# Patient Record
Sex: MALE | Race: WHITE | NOT HISPANIC OR LATINO | Employment: UNEMPLOYED | URBAN - METROPOLITAN AREA
[De-identification: names, ages, dates, MRNs, and addresses within clinical notes are randomized per-mention and may not be internally consistent; named-entity substitution may affect disease eponyms.]

---

## 2014-09-09 LAB
CREAT SERPL-MCNC: 1.07 MG/DL (ref 0.72–1.25)
GLUCOSE BLD-MCNC: 184 MG/DL (ref 65–100)

## 2014-09-16 LAB
CREAT SERPL-MCNC: 1.03 MG/DL (ref 0.72–1.25)
GLUCOSE BLD-MCNC: 91 MG/DL (ref 65–100)

## 2016-09-01 LAB
CREAT SERPL-MCNC: 1.12 MG/DL (ref 0.72–1.25)
GLUCOSE BLD-MCNC: 372 MG/DL (ref 65–100)

## 2017-02-04 ENCOUNTER — OFFICE VISIT - RIVER FALLS (OUTPATIENT)
Dept: FAMILY MEDICINE | Facility: CLINIC | Age: 42
End: 2017-02-04

## 2017-02-04 ASSESSMENT — MIFFLIN-ST. JEOR: SCORE: 1586.6

## 2017-02-06 ENCOUNTER — OFFICE VISIT - RIVER FALLS (OUTPATIENT)
Dept: FAMILY MEDICINE | Facility: CLINIC | Age: 42
End: 2017-02-06

## 2017-02-08 ENCOUNTER — OFFICE VISIT - RIVER FALLS (OUTPATIENT)
Dept: FAMILY MEDICINE | Facility: CLINIC | Age: 42
End: 2017-02-08

## 2017-02-08 ASSESSMENT — MIFFLIN-ST. JEOR: SCORE: 1553.04

## 2017-08-24 ENCOUNTER — OFFICE VISIT - RIVER FALLS (OUTPATIENT)
Dept: FAMILY MEDICINE | Facility: CLINIC | Age: 42
End: 2017-08-24

## 2017-08-25 LAB
CREAT SERPL-MCNC: 1.21 MG/DL (ref 0.6–1.35)
GLUCOSE BLD-MCNC: 375 MG/DL (ref 65–99)
HBA1C MFR BLD: 10.3 %

## 2017-08-28 ENCOUNTER — COMMUNICATION - RIVER FALLS (OUTPATIENT)
Dept: FAMILY MEDICINE | Facility: CLINIC | Age: 42
End: 2017-08-28

## 2017-08-28 ENCOUNTER — OFFICE VISIT - RIVER FALLS (OUTPATIENT)
Dept: FAMILY MEDICINE | Facility: CLINIC | Age: 42
End: 2017-08-28

## 2017-08-28 ASSESSMENT — MIFFLIN-ST. JEOR: SCORE: 1577.53

## 2017-09-25 ENCOUNTER — OFFICE VISIT - RIVER FALLS (OUTPATIENT)
Dept: FAMILY MEDICINE | Facility: CLINIC | Age: 42
End: 2017-09-25

## 2017-09-29 ENCOUNTER — OFFICE VISIT - RIVER FALLS (OUTPATIENT)
Dept: FAMILY MEDICINE | Facility: CLINIC | Age: 42
End: 2017-09-29

## 2017-09-29 ASSESSMENT — MIFFLIN-ST. JEOR: SCORE: 1594.48

## 2017-11-06 ENCOUNTER — OFFICE VISIT - RIVER FALLS (OUTPATIENT)
Dept: FAMILY MEDICINE | Facility: CLINIC | Age: 42
End: 2017-11-06

## 2017-11-06 ASSESSMENT — MIFFLIN-ST. JEOR: SCORE: 1612.91

## 2018-02-12 ENCOUNTER — AMBULATORY - RIVER FALLS (OUTPATIENT)
Dept: FAMILY MEDICINE | Facility: CLINIC | Age: 43
End: 2018-02-12

## 2018-02-13 LAB
CHOLEST SERPL-MCNC: 174 MG/DL
CHOLEST/HDLC SERPL: 2.9 {RATIO}
HBA1C MFR BLD: 7.9 %
HDLC SERPL-MCNC: 60 MG/DL
LDLC SERPL CALC-MCNC: 98 MG/DL
NONHDLC SERPL-MCNC: 114 MG/DL
TRIGL SERPL-MCNC: 71 MG/DL

## 2018-02-15 ENCOUNTER — OFFICE VISIT - RIVER FALLS (OUTPATIENT)
Dept: FAMILY MEDICINE | Facility: CLINIC | Age: 43
End: 2018-02-15

## 2018-09-28 ENCOUNTER — AMBULATORY - RIVER FALLS (OUTPATIENT)
Dept: FAMILY MEDICINE | Facility: CLINIC | Age: 43
End: 2018-09-28

## 2018-09-29 LAB
CREAT SERPL-MCNC: 1.15 MG/DL (ref 0.6–1.35)
GLUCOSE BLD-MCNC: 146 MG/DL (ref 65–99)
HBA1C MFR BLD: 8 %

## 2018-10-05 ENCOUNTER — OFFICE VISIT - RIVER FALLS (OUTPATIENT)
Dept: FAMILY MEDICINE | Facility: CLINIC | Age: 43
End: 2018-10-05

## 2019-02-21 ENCOUNTER — OFFICE VISIT - RIVER FALLS (OUTPATIENT)
Dept: FAMILY MEDICINE | Facility: CLINIC | Age: 44
End: 2019-02-21

## 2019-02-21 ASSESSMENT — MIFFLIN-ST. JEOR: SCORE: 1618.35

## 2019-03-14 ENCOUNTER — OFFICE VISIT - RIVER FALLS (OUTPATIENT)
Dept: FAMILY MEDICINE | Facility: CLINIC | Age: 44
End: 2019-03-14

## 2019-03-21 LAB — LDLC SERPL CALC-MCNC: 137 MG/DL

## 2019-03-28 LAB — HBA1C MFR BLD: NORMAL %

## 2019-04-15 ENCOUNTER — AMBULATORY - RIVER FALLS (OUTPATIENT)
Dept: FAMILY MEDICINE | Facility: CLINIC | Age: 44
End: 2019-04-15

## 2019-04-15 ENCOUNTER — OFFICE VISIT - RIVER FALLS (OUTPATIENT)
Dept: FAMILY MEDICINE | Facility: CLINIC | Age: 44
End: 2019-04-15

## 2019-04-16 LAB — HBA1C MFR BLD: 8.3 %

## 2019-06-10 ENCOUNTER — OFFICE VISIT - RIVER FALLS (OUTPATIENT)
Dept: FAMILY MEDICINE | Facility: CLINIC | Age: 44
End: 2019-06-10

## 2019-06-10 ASSESSMENT — MIFFLIN-ST. JEOR: SCORE: 1605.65

## 2019-06-25 LAB
TESTOSTERONE FREE: 7.3 NG/DL
TESTOSTERONE,TOTAL - HISTORICAL: 526.46 NG/DL

## 2019-07-11 ENCOUNTER — OFFICE VISIT - RIVER FALLS (OUTPATIENT)
Dept: FAMILY MEDICINE | Facility: CLINIC | Age: 44
End: 2019-07-11

## 2019-07-11 ASSESSMENT — MIFFLIN-ST. JEOR: SCORE: 1598.4

## 2019-07-12 LAB — HBA1C MFR BLD: 7.8 %

## 2019-07-15 ENCOUNTER — COMMUNICATION - RIVER FALLS (OUTPATIENT)
Dept: FAMILY MEDICINE | Facility: CLINIC | Age: 44
End: 2019-07-15

## 2019-08-13 ENCOUNTER — AMBULATORY - RIVER FALLS (OUTPATIENT)
Dept: FAMILY MEDICINE | Facility: CLINIC | Age: 44
End: 2019-08-13

## 2019-09-11 ENCOUNTER — OFFICE VISIT - RIVER FALLS (OUTPATIENT)
Dept: FAMILY MEDICINE | Facility: CLINIC | Age: 44
End: 2019-09-11

## 2019-10-03 ENCOUNTER — OFFICE VISIT - RIVER FALLS (OUTPATIENT)
Dept: FAMILY MEDICINE | Facility: CLINIC | Age: 44
End: 2019-10-03

## 2019-10-03 ASSESSMENT — MIFFLIN-ST. JEOR: SCORE: 1631.96

## 2019-10-04 LAB
BUN SERPL-MCNC: 18 MG/DL (ref 7–25)
BUN/CREAT RATIO - HISTORICAL: ABNORMAL (ref 6–22)
CALCIUM SERPL-MCNC: 10.1 MG/DL (ref 8.6–10.3)
CHLORIDE BLD-SCNC: 103 MMOL/L (ref 98–110)
CO2 SERPL-SCNC: 26 MMOL/L (ref 20–32)
CREAT SERPL-MCNC: 1.06 MG/DL (ref 0.6–1.35)
CREAT UR-MCNC: 117 MG/DL (ref 20–320)
EGFRCR SERPLBLD CKD-EPI 2021: 84 ML/MIN/1.73M2
GLUCOSE BLD-MCNC: 44 MG/DL (ref 65–99)
MICROALBUMIN UR-MCNC: 19 MG/DL
MICROALBUMIN/CREAT UR: 162 MG/G{CREAT}
POTASSIUM BLD-SCNC: 3.9 MMOL/L (ref 3.5–5.3)
SODIUM SERPL-SCNC: 138 MMOL/L (ref 135–146)

## 2019-10-05 ENCOUNTER — COMMUNICATION - RIVER FALLS (OUTPATIENT)
Dept: FAMILY MEDICINE | Facility: CLINIC | Age: 44
End: 2019-10-05

## 2019-10-07 ENCOUNTER — COMMUNICATION - RIVER FALLS (OUTPATIENT)
Dept: FAMILY MEDICINE | Facility: CLINIC | Age: 44
End: 2019-10-07

## 2019-11-01 ENCOUNTER — AMBULATORY - RIVER FALLS (OUTPATIENT)
Dept: FAMILY MEDICINE | Facility: CLINIC | Age: 44
End: 2019-11-01

## 2019-11-08 ENCOUNTER — OFFICE VISIT - RIVER FALLS (OUTPATIENT)
Dept: FAMILY MEDICINE | Facility: CLINIC | Age: 44
End: 2019-11-08

## 2019-11-08 ASSESSMENT — MIFFLIN-ST. JEOR: SCORE: 1632.33

## 2019-11-11 ENCOUNTER — COMMUNICATION - RIVER FALLS (OUTPATIENT)
Dept: FAMILY MEDICINE | Facility: CLINIC | Age: 44
End: 2019-11-11

## 2019-11-26 ENCOUNTER — OFFICE VISIT - RIVER FALLS (OUTPATIENT)
Dept: FAMILY MEDICINE | Facility: CLINIC | Age: 44
End: 2019-11-26

## 2019-11-26 ENCOUNTER — COMMUNICATION - RIVER FALLS (OUTPATIENT)
Dept: FAMILY MEDICINE | Facility: CLINIC | Age: 44
End: 2019-11-26

## 2019-11-26 LAB
A/G RATIO - HISTORICAL: 1.6 (ref 1–2)
ALBUMIN SERPL-MCNC: 4.4 G/DL (ref 3.5–5.2)
ALBUMIN UR-MCNC: ABNORMAL G/DL
ALP SERPL-CCNC: 105 IU/L (ref 50–136)
ALT SERPL W P-5'-P-CCNC: 39 IU/L (ref 8–45)
ANION GAP SERPL CALCULATED.3IONS-SCNC: 9 MMOL/L (ref 5–18)
AST SERPL W P-5'-P-CCNC: 29 IU/L (ref 2–40)
BACTERIA #/AREA URNS HPF: NORMAL /[HPF]
BILIRUB DIRECT SERPL-MCNC: 0.1 MG/DL (ref 0.1–0.5)
BILIRUB INDIRECT SERPL-MCNC: 0.2 MG/DL (ref 0.2–0.8)
BILIRUB SERPL-MCNC: 0.3 MG/DL (ref 0.2–1.2)
BILIRUB UR QL STRIP: NEGATIVE
BUN SERPL-MCNC: 12 MG/DL (ref 8–25)
BUN/CREAT RATIO - HISTORICAL: 12 (ref 10–20)
CALCIUM SERPL-MCNC: 9.7 MG/DL (ref 8.5–10.5)
CHLORIDE BLD-SCNC: 107 MMOL/L (ref 98–110)
CO2 SERPL-SCNC: 25 MMOL/L (ref 21–31)
CREAT SERPL-MCNC: 0.99 MG/DL (ref 0.72–1.25)
EPITHELIAL CELLS UR: NORMAL
ERYTHROCYTE [DISTWIDTH] IN BLOOD BY AUTOMATED COUNT: 13.6 % (ref 11.5–15.5)
GFR ESTIMATE EXT - HISTORICAL: >60
GLOBULIN: 2.7 G/DL (ref 2–3.7)
GLUCOSE BLD-MCNC: 135 MG/DL (ref 65–100)
GLUCOSE UR STRIP-MCNC: NEGATIVE MG/DL
HCT VFR BLD AUTO: 39.7 % (ref 37–53)
HGB BLD-MCNC: 13.8 G/DL (ref 13.5–17.5)
HGB UR QL STRIP: NEGATIVE
KETONES UR STRIP-MCNC: NEGATIVE MG/DL
LEUKOCYTE ESTERASE UR QL STRIP: NEGATIVE
MCH RBC QN AUTO: 30.3 PG (ref 26–34)
MCHC RBC AUTO-ENTMCNC: 34.8 G/DL (ref 32–36)
MCV RBC AUTO: 87 FL (ref 80–100)
MUCOUS THREADS #/AREA URNS LPF: PRESENT /[LPF]
NITRATE UR QL: NEGATIVE
PH UR STRIP: 6.5 [PH] (ref 5–8)
PLATELET # BLD AUTO: 288 10*3/UL (ref 140–440)
PMV BLD: 9.1 FL (ref 6.5–11)
POTASSIUM BLD-SCNC: 4 MMOL/L (ref 3.5–5)
PROT SERPL-MCNC: 7.1 G/DL (ref 6–8)
RBC # BLD AUTO: 4.56 10*6/UL (ref 4.3–5.9)
RBC #/AREA URNS AUTO: NORMAL /[HPF]
SODIUM SERPL-SCNC: 141 MMOL/L (ref 135–145)
SP GR UR STRIP: 1.02 (ref 1–1.03)
WBC # BLD AUTO: 7.2 10*3/UL (ref 4.5–11)
WBC #/AREA URNS AUTO: NORMAL /[HPF]

## 2019-11-26 ASSESSMENT — MIFFLIN-ST. JEOR: SCORE: 1627.43

## 2020-01-08 ENCOUNTER — OFFICE VISIT - RIVER FALLS (OUTPATIENT)
Dept: FAMILY MEDICINE | Facility: CLINIC | Age: 45
End: 2020-01-08

## 2020-01-08 ASSESSMENT — MIFFLIN-ST. JEOR: SCORE: 1653.74

## 2020-04-28 ENCOUNTER — COMMUNICATION - RIVER FALLS (OUTPATIENT)
Dept: FAMILY MEDICINE | Facility: CLINIC | Age: 45
End: 2020-04-28

## 2020-04-28 ENCOUNTER — OFFICE VISIT - RIVER FALLS (OUTPATIENT)
Dept: FAMILY MEDICINE | Facility: CLINIC | Age: 45
End: 2020-04-28

## 2020-08-17 ENCOUNTER — AMBULATORY - RIVER FALLS (OUTPATIENT)
Dept: FAMILY MEDICINE | Facility: CLINIC | Age: 45
End: 2020-08-17

## 2020-08-18 LAB
CHOLEST SERPL-MCNC: 184 MG/DL
CHOLEST/HDLC SERPL: 3.1 {RATIO}
HBA1C MFR BLD: 8.6 %
HDLC SERPL-MCNC: 60 MG/DL
LDLC SERPL CALC-MCNC: 106 MG/DL
NONHDLC SERPL-MCNC: 124 MG/DL
TRIGL SERPL-MCNC: 88 MG/DL

## 2020-08-19 ENCOUNTER — COMMUNICATION - RIVER FALLS (OUTPATIENT)
Dept: FAMILY MEDICINE | Facility: CLINIC | Age: 45
End: 2020-08-19

## 2020-08-21 ENCOUNTER — OFFICE VISIT - RIVER FALLS (OUTPATIENT)
Dept: FAMILY MEDICINE | Facility: CLINIC | Age: 45
End: 2020-08-21

## 2020-08-21 ASSESSMENT — MIFFLIN-ST. JEOR: SCORE: 1581.16

## 2020-10-28 ENCOUNTER — OFFICE VISIT - RIVER FALLS (OUTPATIENT)
Dept: FAMILY MEDICINE | Facility: CLINIC | Age: 45
End: 2020-10-28

## 2020-10-28 ENCOUNTER — AMBULATORY - RIVER FALLS (OUTPATIENT)
Dept: FAMILY MEDICINE | Facility: CLINIC | Age: 45
End: 2020-10-28

## 2020-12-15 ENCOUNTER — OFFICE VISIT - RIVER FALLS (OUTPATIENT)
Dept: FAMILY MEDICINE | Facility: CLINIC | Age: 45
End: 2020-12-15

## 2020-12-15 ENCOUNTER — AMBULATORY - RIVER FALLS (OUTPATIENT)
Dept: FAMILY MEDICINE | Facility: CLINIC | Age: 45
End: 2020-12-15

## 2020-12-17 LAB — SARS-COV-2 RNA RESP QL NAA+PROBE: POSITIVE

## 2021-04-02 ENCOUNTER — AMBULATORY - RIVER FALLS (OUTPATIENT)
Dept: FAMILY MEDICINE | Facility: CLINIC | Age: 46
End: 2021-04-02

## 2021-06-11 ENCOUNTER — OFFICE VISIT - RIVER FALLS (OUTPATIENT)
Dept: FAMILY MEDICINE | Facility: CLINIC | Age: 46
End: 2021-06-11

## 2021-06-11 ASSESSMENT — MIFFLIN-ST. JEOR: SCORE: 1611.1

## 2021-06-12 LAB
ALBUMIN UR-MCNC: NEGATIVE G/DL
APPEARANCE UR: CLEAR
BACTERIA #/AREA URNS HPF: ABNORMAL /HPF
BILIRUB UR QL STRIP: NEGATIVE
BUN SERPL-MCNC: 14 MG/DL (ref 7–25)
BUN/CREAT RATIO - HISTORICAL: NORMAL (ref 6–22)
CALCIUM SERPL-MCNC: 9 MG/DL (ref 8.6–10.3)
CHLORIDE BLD-SCNC: 107 MMOL/L (ref 98–110)
CK SERPL-CCNC: 144 UNIT/L (ref 44–196)
CO2 SERPL-SCNC: 25 MMOL/L (ref 20–32)
COLOR UR AUTO: YELLOW
CREAT SERPL-MCNC: 0.88 MG/DL (ref 0.6–1.35)
CREAT UR-MCNC: 75 MG/DL (ref 20–320)
EGFRCR SERPLBLD CKD-EPI 2021: 103 ML/MIN/1.73M2
ERYTHROCYTE [DISTWIDTH] IN BLOOD BY AUTOMATED COUNT: 13.5 % (ref 11–15)
GLUCOSE BLD-MCNC: 83 MG/DL (ref 65–99)
GLUCOSE UR STRIP-MCNC: ABNORMAL MG/DL
HBA1C MFR BLD: 9.2 %
HCT VFR BLD AUTO: 40 % (ref 38.5–50)
HGB BLD-MCNC: 13.6 GM/DL (ref 13.2–17.1)
HGB UR QL STRIP: NEGATIVE
HYALINE CASTS #/AREA URNS LPF: ABNORMAL /LPF
KETONES UR STRIP-MCNC: NEGATIVE MG/DL
LEUKOCYTE ESTERASE UR QL STRIP: NEGATIVE
MCH RBC QN AUTO: 30.4 PG (ref 27–33)
MCHC RBC AUTO-ENTMCNC: 34 GM/DL (ref 32–36)
MCV RBC AUTO: 89.3 FL (ref 80–100)
MICROALBUMIN UR-MCNC: 3.8 MG/DL
MICROALBUMIN/CREAT UR: 51 MG/G{CREAT}
NITRATE UR QL: NEGATIVE
PH UR STRIP: 6.5 [PH] (ref 5–8)
PLATELET # BLD AUTO: 268 10*3/UL (ref 140–400)
PMV BLD: 9.6 FL (ref 7.5–12.5)
POTASSIUM BLD-SCNC: 4 MMOL/L (ref 3.5–5.3)
RBC # BLD AUTO: 4.48 10*6/UL (ref 4.2–5.8)
RBC #/AREA URNS AUTO: ABNORMAL /HPF
SODIUM SERPL-SCNC: 139 MMOL/L (ref 135–146)
SP GR UR STRIP: 1.01 (ref 1–1.03)
SQUAMOUS #/AREA URNS AUTO: ABNORMAL /HPF
WBC # BLD AUTO: 8.1 10*3/UL (ref 3.8–10.8)
WBC #/AREA URNS AUTO: ABNORMAL /HPF

## 2021-06-16 ENCOUNTER — COMMUNICATION - RIVER FALLS (OUTPATIENT)
Dept: FAMILY MEDICINE | Facility: CLINIC | Age: 46
End: 2021-06-16

## 2021-06-17 ENCOUNTER — AMBULATORY - RIVER FALLS (OUTPATIENT)
Dept: FAMILY MEDICINE | Facility: CLINIC | Age: 46
End: 2021-06-17

## 2021-10-15 ENCOUNTER — OFFICE VISIT - RIVER FALLS (OUTPATIENT)
Dept: FAMILY MEDICINE | Facility: CLINIC | Age: 46
End: 2021-10-15

## 2021-10-15 ASSESSMENT — MIFFLIN-ST. JEOR: SCORE: 1626.07

## 2021-10-17 LAB — BACTERIA SPEC CULT: NORMAL

## 2021-10-18 LAB
CHLAMYDIA TRACHOMATIS RNA, TMA - QUEST: NOT DETECTED
NEISSERIA GONORRHOEAE RNA TMA: NOT DETECTED

## 2021-10-25 ENCOUNTER — COMMUNICATION - RIVER FALLS (OUTPATIENT)
Dept: FAMILY MEDICINE | Facility: CLINIC | Age: 46
End: 2021-10-25

## 2021-10-28 ENCOUNTER — OFFICE VISIT - RIVER FALLS (OUTPATIENT)
Dept: FAMILY MEDICINE | Facility: CLINIC | Age: 46
End: 2021-10-28

## 2021-10-28 LAB
ALBUMIN UR-MCNC: NEGATIVE G/DL
APPEARANCE UR: CLEAR
BILIRUB UR QL STRIP: NEGATIVE
COLOR UR AUTO: YELLOW
GLUCOSE UR STRIP-MCNC: NORMAL MG/DL
HGB UR QL STRIP: NEGATIVE
KETONES UR STRIP-MCNC: NEGATIVE MG/DL
LEUKOCYTE ESTERASE UR QL STRIP: NEGATIVE
NITRATE UR QL: NEGATIVE
PH UR STRIP: 5.5 [PH]
SP GR UR STRIP: 1.01
UROBILINOGEN UR STRIP-MCNC: NORMAL MG/DL

## 2021-10-28 ASSESSMENT — MIFFLIN-ST. JEOR: SCORE: 1629.69

## 2021-12-02 ENCOUNTER — OFFICE VISIT - RIVER FALLS (OUTPATIENT)
Dept: FAMILY MEDICINE | Facility: CLINIC | Age: 46
End: 2021-12-02

## 2021-12-02 ASSESSMENT — MIFFLIN-ST. JEOR: SCORE: 1657.82

## 2021-12-04 LAB
CHOLEST SERPL-MCNC: 157 MG/DL
CHOLEST/HDLC SERPL: 2.5 {RATIO}
HBA1C MFR BLD: 10.1 %
HDLC SERPL-MCNC: 63 MG/DL
LDLC SERPL CALC-MCNC: 74 MG/DL
NONHDLC SERPL-MCNC: 94 MG/DL
TRIGL SERPL-MCNC: 112 MG/DL

## 2021-12-05 ENCOUNTER — COMMUNICATION - RIVER FALLS (OUTPATIENT)
Dept: FAMILY MEDICINE | Facility: CLINIC | Age: 46
End: 2021-12-05

## 2021-12-28 ENCOUNTER — OFFICE VISIT - RIVER FALLS (OUTPATIENT)
Dept: FAMILY MEDICINE | Facility: CLINIC | Age: 46
End: 2021-12-28

## 2021-12-28 LAB
BUN SERPL-MCNC: 24 MG/DL (ref 7–25)
BUN/CREAT RATIO - HISTORICAL: ABNORMAL (ref 6–22)
CALCIUM SERPL-MCNC: 9.6 MG/DL (ref 8.6–10.3)
CHLORIDE BLD-SCNC: 99 MMOL/L (ref 98–110)
CO2 SERPL-SCNC: 27 MMOL/L (ref 20–32)
CREAT SERPL-MCNC: 1.19 MG/DL (ref 0.6–1.35)
EGFRCR SERPLBLD CKD-EPI 2021: 72 ML/MIN/1.73M2
ERYTHROCYTE [DISTWIDTH] IN BLOOD BY AUTOMATED COUNT: 13.2 % (ref 11–15)
GLUCOSE BLD-MCNC: 196 MG/DL (ref 65–99)
HCT VFR BLD AUTO: 37.5 % (ref 38.5–50)
HGB BLD-MCNC: 12.5 GM/DL (ref 13.2–17.1)
MCH RBC QN AUTO: 30.5 PG (ref 27–33)
MCHC RBC AUTO-ENTMCNC: 33.3 GM/DL (ref 32–36)
MCV RBC AUTO: 91.5 FL (ref 80–100)
PLATELET # BLD AUTO: 268 10*3/UL (ref 140–400)
PMV BLD: 9.5 FL (ref 7.5–12.5)
POTASSIUM BLD-SCNC: 4.4 MMOL/L (ref 3.5–5.3)
RBC # BLD AUTO: 4.1 10*6/UL (ref 4.2–5.8)
SODIUM SERPL-SCNC: 135 MMOL/L (ref 135–146)
WBC # BLD AUTO: 8.7 10*3/UL (ref 3.8–10.8)

## 2021-12-28 ASSESSMENT — MIFFLIN-ST. JEOR: SCORE: 1649.2

## 2021-12-29 LAB — TROPONIN I - HISTORICAL: <3 NG/L

## 2021-12-30 ENCOUNTER — COMMUNICATION - RIVER FALLS (OUTPATIENT)
Dept: FAMILY MEDICINE | Facility: CLINIC | Age: 46
End: 2021-12-30

## 2022-01-06 ENCOUNTER — AMBULATORY - RIVER FALLS (OUTPATIENT)
Dept: FAMILY MEDICINE | Facility: CLINIC | Age: 47
End: 2022-01-06

## 2022-01-10 ENCOUNTER — LAB REQUISITION (OUTPATIENT)
Dept: LAB | Facility: CLINIC | Age: 47
End: 2022-01-10
Payer: COMMERCIAL

## 2022-01-10 ENCOUNTER — OFFICE VISIT - RIVER FALLS (OUTPATIENT)
Dept: FAMILY MEDICINE | Facility: CLINIC | Age: 47
End: 2022-01-10

## 2022-01-10 ENCOUNTER — AMBULATORY - RIVER FALLS (OUTPATIENT)
Dept: FAMILY MEDICINE | Facility: CLINIC | Age: 47
End: 2022-01-10

## 2022-01-10 DIAGNOSIS — U07.1 COVID-19: ICD-10-CM

## 2022-01-10 PROCEDURE — U0003 INFECTIOUS AGENT DETECTION BY NUCLEIC ACID (DNA OR RNA); SEVERE ACUTE RESPIRATORY SYNDROME CORONAVIRUS 2 (SARS-COV-2) (CORONAVIRUS DISEASE [COVID-19]), AMPLIFIED PROBE TECHNIQUE, MAKING USE OF HIGH THROUGHPUT TECHNOLOGIES AS DESCRIBED BY CMS-2020-01-R: HCPCS | Mod: ORL | Performed by: FAMILY MEDICINE

## 2022-01-11 LAB — SARS-COV-2 RNA RESP QL NAA+PROBE: NEGATIVE

## 2022-01-12 LAB — SARS-COV-2 RNA RESP QL NAA+PROBE: NEGATIVE

## 2022-02-11 VITALS
BODY MASS INDEX: 28.82 KG/M2 | DIASTOLIC BLOOD PRESSURE: 70 MMHG | HEIGHT: 65 IN | OXYGEN SATURATION: 98 % | HEART RATE: 86 BPM | HEART RATE: 71 BPM | HEIGHT: 65 IN | SYSTOLIC BLOOD PRESSURE: 110 MMHG | HEART RATE: 106 BPM | BODY MASS INDEX: 28.79 KG/M2 | WEIGHT: 165.6 LBS | DIASTOLIC BLOOD PRESSURE: 89 MMHG | OXYGEN SATURATION: 98 % | WEIGHT: 173 LBS | SYSTOLIC BLOOD PRESSURE: 149 MMHG | SYSTOLIC BLOOD PRESSURE: 124 MMHG | DIASTOLIC BLOOD PRESSURE: 77 MMHG | TEMPERATURE: 98.6 F | TEMPERATURE: 99.4 F | BODY MASS INDEX: 27.59 KG/M2 | WEIGHT: 173 LBS | TEMPERATURE: 97.4 F

## 2022-02-11 VITALS
SYSTOLIC BLOOD PRESSURE: 152 MMHG | WEIGHT: 180 LBS | BODY MASS INDEX: 30.45 KG/M2 | HEART RATE: 88 BPM | DIASTOLIC BLOOD PRESSURE: 84 MMHG | TEMPERATURE: 98.1 F | BODY MASS INDEX: 29.99 KG/M2 | WEIGHT: 183 LBS | HEIGHT: 65 IN

## 2022-02-11 VITALS — BODY MASS INDEX: 30.45 KG/M2 | HEIGHT: 65 IN

## 2022-02-12 VITALS
SYSTOLIC BLOOD PRESSURE: 160 MMHG | OXYGEN SATURATION: 100 % | HEART RATE: 81 BPM | BODY MASS INDEX: 29.72 KG/M2 | DIASTOLIC BLOOD PRESSURE: 81 MMHG | WEIGHT: 178.4 LBS | HEIGHT: 65 IN | TEMPERATURE: 97.8 F

## 2022-02-12 VITALS
HEIGHT: 65 IN | TEMPERATURE: 97.6 F | OXYGEN SATURATION: 92 % | TEMPERATURE: 97.1 F | HEART RATE: 78 BPM | DIASTOLIC BLOOD PRESSURE: 74 MMHG | BODY MASS INDEX: 30.29 KG/M2 | HEART RATE: 89 BPM | OXYGEN SATURATION: 97 % | TEMPERATURE: 97.8 F | WEIGHT: 183.08 LBS | SYSTOLIC BLOOD PRESSURE: 154 MMHG | DIASTOLIC BLOOD PRESSURE: 78 MMHG | SYSTOLIC BLOOD PRESSURE: 148 MMHG | WEIGHT: 182 LBS | DIASTOLIC BLOOD PRESSURE: 82 MMHG | WEIGHT: 183 LBS | SYSTOLIC BLOOD PRESSURE: 136 MMHG | BODY MASS INDEX: 30.49 KG/M2 | HEART RATE: 88 BPM | HEIGHT: 65 IN | BODY MASS INDEX: 30.5 KG/M2

## 2022-02-12 VITALS
HEIGHT: 65 IN | HEART RATE: 60 BPM | DIASTOLIC BLOOD PRESSURE: 70 MMHG | HEIGHT: 65 IN | WEIGHT: 187.8 LBS | SYSTOLIC BLOOD PRESSURE: 124 MMHG | HEART RATE: 78 BPM | BODY MASS INDEX: 31.29 KG/M2 | TEMPERATURE: 97.6 F | BODY MASS INDEX: 30.32 KG/M2 | TEMPERATURE: 97.6 F | DIASTOLIC BLOOD PRESSURE: 76 MMHG | WEIGHT: 182 LBS | SYSTOLIC BLOOD PRESSURE: 126 MMHG

## 2022-02-12 VITALS
HEART RATE: 77 BPM | TEMPERATURE: 97.6 F | BODY MASS INDEX: 31.12 KG/M2 | DIASTOLIC BLOOD PRESSURE: 73 MMHG | HEIGHT: 65 IN | SYSTOLIC BLOOD PRESSURE: 119 MMHG | SYSTOLIC BLOOD PRESSURE: 135 MMHG | TEMPERATURE: 98.7 F | OXYGEN SATURATION: 98 % | DIASTOLIC BLOOD PRESSURE: 80 MMHG | BODY MASS INDEX: 31.44 KG/M2 | WEIGHT: 186.8 LBS | HEART RATE: 77 BPM | HEIGHT: 65 IN | WEIGHT: 188.7 LBS | OXYGEN SATURATION: 99 %

## 2022-02-12 VITALS
HEART RATE: 100 BPM | DIASTOLIC BLOOD PRESSURE: 76 MMHG | BODY MASS INDEX: 28.62 KG/M2 | BODY MASS INDEX: 28.59 KG/M2 | HEIGHT: 65 IN | HEIGHT: 65 IN | WEIGHT: 171.8 LBS | TEMPERATURE: 97.8 F | SYSTOLIC BLOOD PRESSURE: 120 MMHG

## 2022-02-12 VITALS
SYSTOLIC BLOOD PRESSURE: 130 MMHG | BODY MASS INDEX: 29.29 KG/M2 | DIASTOLIC BLOOD PRESSURE: 66 MMHG | OXYGEN SATURATION: 96 % | WEIGHT: 176 LBS | TEMPERATURE: 97.5 F | DIASTOLIC BLOOD PRESSURE: 86 MMHG | HEART RATE: 92 BPM | SYSTOLIC BLOOD PRESSURE: 110 MMHG

## 2022-02-12 VITALS — HEIGHT: 65 IN | BODY MASS INDEX: 30.45 KG/M2

## 2022-02-12 VITALS
DIASTOLIC BLOOD PRESSURE: 85 MMHG | TEMPERATURE: 97.8 F | HEART RATE: 82 BPM | SYSTOLIC BLOOD PRESSURE: 155 MMHG | OXYGEN SATURATION: 98 % | WEIGHT: 181.7 LBS | SYSTOLIC BLOOD PRESSURE: 164 MMHG | DIASTOLIC BLOOD PRESSURE: 78 MMHG | HEART RATE: 90 BPM | TEMPERATURE: 98.4 F | HEIGHT: 65 IN | BODY MASS INDEX: 30.41 KG/M2 | HEIGHT: 65 IN | WEIGHT: 182.5 LBS | BODY MASS INDEX: 30.27 KG/M2 | OXYGEN SATURATION: 96 %

## 2022-02-12 VITALS
SYSTOLIC BLOOD PRESSURE: 138 MMHG | BODY MASS INDEX: 30.55 KG/M2 | TEMPERATURE: 97.6 F | WEIGHT: 183.6 LBS | DIASTOLIC BLOOD PRESSURE: 76 MMHG | HEART RATE: 88 BPM

## 2022-02-12 VITALS
WEIGHT: 174.74 LBS | BODY MASS INDEX: 29.79 KG/M2 | BODY MASS INDEX: 29.11 KG/M2 | WEIGHT: 178.8 LBS | HEIGHT: 65 IN | HEIGHT: 65 IN

## 2022-02-12 VITALS
HEIGHT: 65 IN | OXYGEN SATURATION: 93 % | HEART RATE: 79 BPM | SYSTOLIC BLOOD PRESSURE: 110 MMHG | DIASTOLIC BLOOD PRESSURE: 62 MMHG | BODY MASS INDEX: 28.62 KG/M2 | BODY MASS INDEX: 28.49 KG/M2 | DIASTOLIC BLOOD PRESSURE: 80 MMHG | TEMPERATURE: 97.9 F | SYSTOLIC BLOOD PRESSURE: 130 MMHG | TEMPERATURE: 97.3 F | WEIGHT: 172 LBS | HEART RATE: 72 BPM | WEIGHT: 171 LBS

## 2022-02-12 VITALS
WEIGHT: 175.6 LBS | HEIGHT: 65 IN | BODY MASS INDEX: 29.52 KG/M2 | WEIGHT: 177.2 LBS | HEIGHT: 65 IN | BODY MASS INDEX: 29.26 KG/M2

## 2022-02-14 ENCOUNTER — COMMUNICATION - RIVER FALLS (OUTPATIENT)
Dept: FAMILY MEDICINE | Facility: CLINIC | Age: 47
End: 2022-02-14

## 2022-02-16 NOTE — LETTER
(Inserted Image. Unable to display)   [place] [address]  June 16, 2021      CHAD LIDDLE  [address] [place] [address]        Dear QUITA,     Thank you for selecting [facility] (previously [facility] for your healthcare needs. Below you will find the results of your recent test(s) done at our clinic.       Labs for your review.  We can discuss in more detail at future clinic visit.       Result Name Current Result Previous Result Reference Range   Sodium Level (mmol/L)  139 6/11/2021  141 11/26/2019 135 - 146   Potassium Level (mmol/L)  4.0 6/11/2021  4.0 11/26/2019 3.5 - 5.3   Chloride Level (mmol/L)  107 6/11/2021  107 11/26/2019 98 - 110   CO2 Level (mmol/L)  25 6/11/2021  25 11/26/2019 20 - 32   Glucose Level (mg/dL)  83 6/11/2021 ((H)) 135 11/26/2019 65 - 99   BUN (mg/dL)  14 6/11/2021  12 11/26/2019 7 - 25   Creatinine Level (mg/dL)  0.88 6/11/2021  0.99 11/26/2019 0.60 - 1.35   BUN/Creat Ratio  NOT APPLICABLE 6/11/2021  12 11/26/2019 6 - 22   eGFR (mL/min/1.73m2)  103 6/11/2021  84 10/3/2019 > OR = 60 -    eGFR  (mL/min/1.73m2)  119 6/11/2021  >60 11/26/2019 > OR = 60 -    Calcium Level (mg/dL)  9.0 6/11/2021  9.7 11/26/2019 8.6 - 10.3   Hgb A1c ((H)) 9.2 6/11/2021 ((H)) 8.6 8/17/2020  - <5.7   Total CK (unit/L)  144 6/11/2021  44 - 196   U Creatinine (mg/dL)  75 6/11/2021  20 - 320   U Microalbumin (mg/dL)  3.8 6/11/2021  19.0 10/3/2019 See Note: -    Ur Microalbumin/Creatinine Ratio ((H)) 51 6/11/2021 ((H)) 162 10/3/2019  - <30   WBC  8.1 6/11/2021  7.2 11/26/2019 3.8 - 10.8   RBC  4.48 6/11/2021  4.56 11/26/2019 4.20 - 5.80   Hgb (gm/dL)  13.6 6/11/2021  13.8 11/26/2019 13.2 - 17.1   Hct (%)  40.0 6/11/2021  39.7 11/26/2019 38.5 - 50.0   MCV (fL)  89.3 6/11/2021  87 11/26/2019 80.0 - 100.0   MCH (pg)  30.4 6/11/2021  30.3 11/26/2019 27.0 - 33.0   MCHC (gm/dL)  34.0 6/11/2021  34.8 11/26/2019 32.0 - 36.0   RDW (%)  13.5  6/11/2021  13.6 11/26/2019 11.0 - 15.0   Platelet  268 6/11/2021  288 11/26/2019 140 - 400   MPV (fL)  9.6 6/11/2021  9.1 11/26/2019 7.5 - 12.5   UA Color  YELLOW 6/11/2021  YELLOW -    UA Appear  CLEAR 6/11/2021  CLEAR -    UA pH  6.5 6/11/2021  6.5 11/26/2019 5.0 - 8.0   UA Specific [place]  1.013 6/11/2021  1.020 11/26/2019 1.001 - 1.035   UA Glucose ((A)) 1+ 6/11/2021  NEGATIVE 11/26/2019 NEGATIVE - NEGATIVE   UA Bilirubin  NEGATIVE 6/11/2021  NEGATIVE 11/26/2019 NEGATIVE - NEGATIVE   UA Ketones  NEGATIVE 6/11/2021  NEGATIVE 11/26/2019 NEGATIVE - NEGATIVE   UA Blood  NEGATIVE 6/11/2021  NEGATIVE - NEGATIVE   UA Protein  NEGATIVE 6/11/2021 ((A)) 1+ 11/26/2019 NEGATIVE - NEGATIVE   UA Nitrite  NEGATIVE 6/11/2021  NEGATIVE 11/26/2019 NEGATIVE - NEGATIVE   UA Leukocyte Esterase  NEGATIVE 6/11/2021  NEGATIVE 11/26/2019 NEGATIVE - NEGATIVE   UA Squamous Epithelial Cells (/HPF)  NONE SEEN 6/11/2021   - < OR = 5   UA Hyaline Cast (/LPF)  NONE SEEN 6/11/2021  NONE SEEN -    UA WBC (/HPF)  NONE SEEN 6/11/2021  0-2 11/26/2019  - < OR = 5   UA RBC (/HPF)  NONE SEEN 6/11/2021  3-5 11/26/2019  - < OR = 2   UA Bacteria (/HPF)  NONE SEEN 6/11/2021  Few 11/26/2019 NONE SEEN -        Please contact me or my assistant at [phone] if you have any questions or concerns.     Sincerely,        Tommy Damon MD    What do your labs mean?  Below is a glossary of commonly ordered labs:  LDL - Bad Cholesterol  HDL - Good Cholesterol  AST/ALT - Liver Function  Cr/Creatinine - Kidney Function  Microalbumin - Kidney Function  BUN - Kidney Function  PSA - Prostate   TSH - Thyroid Hormone  HgbA1c - Diabetes Test  Hgb (Hemoglobin) - Red Blood Cells

## 2022-02-16 NOTE — TELEPHONE ENCOUNTER
---------------------  From: Rosina West LPN (Phone Messages Pool ([id number]_[facility]))   Sent: 8/17/2021 11:22:44 AM CDT  Subject: General Message     Phone Message    PCP:   NAHUN      Time of Call:  10:48am       Person Calling:  ?pharmacy  Phone number:  could not understand     Note:   Pharmacy calling requesting Rx's for lisinopril and simvastatin.  Could not understand what pharmacy person was calling from (was not [email or web address] or [facility]- per chart alert these are pharmacies pt uses).    Lisinopril only documented and pt is not on atorvastatin.    Location of caller was very loud with alot of noise/yelling in background. Will ignore request at this time since it was not pt's preferred pharmacy.    Last office visit and reason:  6/11/21 heat exhaustion

## 2022-02-16 NOTE — NURSING NOTE
Vital Signs Entered On:  10/28/2021 5:58 PM CDT    Performed On:  10/28/2021 5:57 PM CDT by Ebonie Anderson CMA               Vital Signs   Systolic Blood Pressure :   155 mmHg (HI)    Diastolic Blood Pressure :   85 mmHg (HI)    Mean Arterial Pressure :   108 mmHg   Ebonie Anderson CMA - 10/28/2021 5:57 PM CDT

## 2022-02-16 NOTE — LETTER
(Inserted Image. Unable to display)   July 15, 2019      CHAD LIDDLE  [address] [place], [place] [id number]        Dear QUITA,     Thank you for selecting [facility] ([facility] for your healthcare needs. Below you will find the results of your recent test(s) done at our clinic.       Labs for your review.  We can discuss in more detail at future clinic visit.       Result Name Current Result Previous Result Reference Range   Hgb A1c ((H)) 7.8 7/11/2019 ((H)) 8.3 4/15/2019  - <5.7       Please contact me or my assistant at [phone] if you have any questions or concerns.     Sincerely,        Tommy Damon MD    What do your labs mean?  Below is a glossary of commonly ordered labs:  LDL - Bad Cholesterol  HDL - Good Cholesterol  AST/ALT - Liver Function  Cr/Creatinine - Kidney Function  Microalbumin - Kidney Function  BUN - Kidney Function  PSA - Prostate   TSH - Thyroid Hormone  HgbA1c - Diabetes Test  Hgb (Hemoglobin) - Red Blood Cells

## 2022-02-16 NOTE — CARE COORDINATION
Pt appears on [facility] chronic disease panel as out of parameters for A1C.  Pt seen 10/5/2018, had labs 9/28/18 A1C was 8.0. Pt has RTC 2/2019 for DM/fasting labs, A1C

## 2022-02-16 NOTE — TELEPHONE ENCOUNTER
---------------------  From: Albert Dozier (Phone Messages Pool ([id number]_[facility]))   Sent: 11/11/2019 3:54:35 PM CST  Subject: FMLA paperwork      Phone Message    PCP:   NAHUN                           Time of Call:  338p                                        Person Calling:  Pt  Phone number:  [phone]; ok to leave detailed message    Message: Pt called as his employer received 2 out of 5 pages of his FMLA paperwork. Printed this and sent to [place] to fax. Pt is aware of this and had no further questions.     Last office visit and reason:  11/8/2019; f/up sleep study.

## 2022-02-16 NOTE — LETTER
(Inserted Image. Unable to display)   October 07, 2019      CHAD LIDDLE  [address] [place], [place] [id number]        Dear QUITA,     Thank you for selecting [facility] ([facility] for your healthcare needs. Below you will find the results of your recent test(s) done at our clinic.      Labs appear stable, other than transiently low blood sugar.        Result Name Current Result Previous Result Reference Range   Sodium Level (mmol/L)  138 10/3/2019  138 9/28/2018 135 - 146   Potassium Level (mmol/L)  3.9 10/3/2019  4.3 9/28/2018 3.5 - 5.3   Chloride Level (mmol/L)  103 10/3/2019  102 9/28/2018 98 - 110   CO2 Level (mmol/L)  26 10/3/2019  29 9/28/2018 20 - 32   Glucose Level (mg/dL) ((L)) 44 10/3/2019 ((H)) 146 9/28/2018 65 - 99   BUN (mg/dL)  18 10/3/2019  15 9/28/2018 7 - 25   Creatinine Level (mg/dL)  1.06 10/3/2019  1.15 9/28/2018 0.60 - 1.35   BUN/Creat Ratio  NOT APPLICABLE 10/3/2019  NOT APPLICABLE 9/28/2018 6 - 22   eGFR (mL/min/1.73m2)  84 10/3/2019  77 9/28/2018 > OR = 60 -    eGFR  (mL/min/1.73m2)  98 10/3/2019  89 9/28/2018 > OR = 60 -    Calcium Level (mg/dL)  10.1 10/3/2019  9.8 9/28/2018 8.6 - 10.3   U Microalbumin (mg/dL)  19.0 10/3/2019  9.7 9/28/2018 See Note: -    Ur Creatinine (mg/dL)  117 10/3/2019  152 9/28/2018 20 - 320   Ur Microalbumin/Creatinine Ratio ((H)) 162 10/3/2019 ((H)) 64 9/28/2018  - <30       Please contact me or my assistant at [phone] if you have any questions or concerns.     Sincerely,        Tommy Damon MD    What do your labs mean?  Below is a glossary of commonly ordered labs:  LDL - Bad Cholesterol  HDL - Good Cholesterol  AST/ALT - Liver Function  Cr/Creatinine - Kidney Function  Microalbumin - Kidney Function  BUN - Kidney Function  PSA - Prostate   TSH - Thyroid Hormone  HgbA1c - Diabetes Test  Hgb (Hemoglobin) - Red Blood Cells

## 2022-02-16 NOTE — TELEPHONE ENCOUNTER
---------------------  From: Edie Wilson RN (Phone Messages Pool ([id number]_[facility]))   To: NAHUN Message Pool ([id number]_[place] [facility] [place]);     Sent: 10/28/2021 11:58:23 AM CDT  Subject: Medications       PCP:   NAHUN      Time of Call:  1150       Person Calling:  Alda from [facility]  Phone number:  [phone]    Note:   Alda calling stating that pt has no used his insurance to pay for lisinopril or rosuvastatin since April 2020.  She is curious as to why pt is no longer taking the medication or if this is a provider approved discontinuation.  Please advise.    Last office visit and reason:  10/15/21  Left orchitis

## 2022-02-16 NOTE — NURSING NOTE
Comprehensive Intake Entered On:  1/8/2020 3:13 PM CST    Performed On:  1/8/2020 3:09 PM CST by Ebonie Anderson CMA               Summary   Chief Complaint :   stomach issues - ongoing  - RUQ pains, h/a's, bloating   Weight Measured :   187.8 lb(Converted to: 187 lb 13 oz, 85.18 kg)    Height Measured :   65 in(Converted to: 5 ft 5 in, 165.10 cm)    Body Mass Index :   31.25 kg/m2 (HI)    Body Surface Area :   1.97 m2   Systolic Blood Pressure :   126 mmHg   Diastolic Blood Pressure :   76 mmHg   Mean Arterial Pressure :   93 mmHg   Peripheral Pulse Rate :   60 bpm   Temperature Tympanic :   97.6 DegF(Converted to: 36.4 DegC)  (LOW)    Ebonie Anderson CMA - 1/8/2020 3:09 PM CST   Health Status   Allergies Verified? :   Yes   Medication History Verified? :   Yes   Immunizations Current :   Yes   Medical History Verified? :   Yes   Pre-Visit Planning Status :   Completed   Tobacco Use? :   Current every day smoker   Ebonie Anderson CMA - 1/8/2020 3:09 PM CST   Social History   Social History   (As Of: 1/8/2020 3:13:15 PM CST)   Alcohol:        Current, Beer (12 oz), 1-2 times per week, 2 drinks/episode average.  3 drinks/episode maximum.   (Last Updated: 12/16/2016 10:29:17 AM CST by Ebonie Anderson CMA)          Tobacco:  Current      Current, Cigarettes, 10 per day.   (Last Updated: 6/9/2010 3:17:18 PM CDT by Yani Santana CMA)          Employment/School:        Employed, Work/School description: [employer].   (Last Updated: 8/31/2010 3:21:41 PM CDT by Srinivas Brown MD)

## 2022-02-16 NOTE — TELEPHONE ENCOUNTER
---------------------  From: Edie Wilson RN (Phone Messages Pool ([id number]))   To: NAHUN Message Pool ([id number]_[place]);     Sent: 12/10/2021 12:35:46 PM CST  Subject: Insulin changes       PCP:   NAHUN      Time of Call:  1230       Person Calling:  Pt  Phone number:  [phone]    Note:   Pt calling stating that his insurance will no longer cover NovoLog and he will need to be changed to something else.  Pt thinks his insurance might cover Humalog.  Please advise.    Last office visit and reason:  12/2/21  Type I DMLM at 1528 for pt that Humalog sent, asked him to c/b with any concerns/issues

## 2022-02-16 NOTE — TELEPHONE ENCOUNTER
---------------------  From: Beata Serrato (Phone Messages Pool ([id number]_[facility]))   To: NAHUN Message Pool ([id number]_[place]);     Sent: 12/15/2020 4:45:56 PM CST  Subject: medtronic fax     PCP:   NAHUN     Time of Call:  1556       Person Calling:  untapt  Phone number:  [phone] opt 2    Returned call at: _    Note:   [facility] stating that they faxed a refill request on 12/9/20, wondering if it has been received and that it can be faxed back to [phone].    Last office visit and reason:  12/15/20 covid testingrec'd and sent back to [place] to fax  verified fax #

## 2022-02-16 NOTE — PROGRESS NOTES
Chief Complaint    c/o right flank pain x 1 week with constipation and change of stool color  History of Present Illness      44-year-old here with some right sided abdominal pain      Bile leak going on and aching sensation side a little bit in the flank sometimes forward into the abdomen.  It persisted and has not gone away.  It has changed day-to-day and it is worse makes it hard to do things he wants to do most the time is just present sensation that he wishes was not there.  He did note some general constipation and thinks his stools been a little light-colored he is been able to eat well his blood sugars have been normal.  He denies any emesis but says when the pain is really bad he does feel little nauseated.  He has not had any fever or chills.  No rashes.  He denies any falls or trauma.  There is no positional pain  Review of Systems      See HPI.  All other review of systems negative.  Physical Exam   Vitals & Measurements    T: 97.6   F (Tympanic)  HR: 78(Peripheral)  BP: 124/70     HT: 65 in  WT: 182 lb  BMI: 30.28       Alert and oriented his abdomen is soft there is no hepatosplenomegaly the area of pain starts in the right lower quadrant but is more in the midaxillary line there is no rashes no tenderness to percussion      No peripheral edema  Assessment/Plan       Abdominal pain, right lower quadrant (R10.31)         Lab testing is unremarkable.  A CT done of his abdomen and pelvis is negative for kidney stones, the appendix is normal in size, there is no evidence of any colitis or other internal problem on CT.  At this point I think most likely is some constipation.  Does not seem to be anything that requires surgical or significant intervention he should concentrate on his serious fiber in his diet might try a laxative such as magnesium and expect this to resolve over the next week or we should recheck him if he gets worse or does not go away         Ordered:          Basic Metabolic Panel  (Request), Priority: Urgent, Abdominal pain, right lower quadrant          CBC w/o Diff (Request), Priority: Urgent, Abdominal pain, right lower quadrant          CT Abdomen and Pelvis w/contrast (Request), Priority: STAT, Instructions: IV CONTRAST (no oral), Abdominal pain, right lower quadrant          Hepatic Function Panel (Request), Priority: Urgent, Abdominal pain, right lower quadrant                Orders:         POC URINALYSIS, UA* ([facility]), Specimen Type: Urine, Collection Date: 11/26/19 9:15:00 CST         Review Orders for Potential Authorizations, 11/26/19 9:17:08 CST         Review Orders for Potential Authorizations, 11/26/19 9:15:55 CST  Patient Information     Name:LIDDLE, CHAD A      Address:      [address] [place] [id number]     Sex:Male     YOB: 1975     [phone]     Emergency Contact:LIDDLE, HEATHER M     MRN:[id number]     [id number]     Location:[facility] [place]     Date of Service:11/26/2019      Primary Care Physician:       Tommy Damon MD, [phone]      Attending Physician:       Efrain Pugh MD, [phone]  Problem List/Past Medical History    Ongoing     Diabetes Type I     Diabetic Retinopathy       Comments: previous laser treatment     DM neuropathy, type I diabetes mellitus     Dyslipidemia     Erectile Dysfunction     GERD (Gastroesophageal Reflux Disease)     history of diabeteic ketoacidosis     Obstructive sleep apnea syndrome, severe       Comments: On 9/23/19 AFUA 48.7 and oximetry eugenio 69% on HST.     Tobacco Abuse-Unspec    Historical     Hospitalized for Diabetes being out of control     Retinopathy due to Secondary Diabetes       Comments: Status post laser therapy (Dr. Sherif Charles)  Procedure/Surgical History     Colonoscopy (01.2015)      Comments: Colon, descending, polyp:  1.  Tubular adenoma  2.  No evidence of high grade dsyplasia  3.  Polyp size:5mm(resection/retrievel - complete)  - Repeat in  5yrs.  Medications    1ST TIER UNIFINE PE 32G X 4MM MISC 32 G X 4MM, See Instructions, 11 refills    aspirin 81 mg oral tablet, 81 mg= 1 tab(s), Oral, daily, 3 refills    Auto Titrating CPAP 6-16 for daily use, See Instructions, 11 refills    Cris Contour Next test strips, See Instructions, 3 refills    Crestor 10 mg oral tablet, 10 mg= 1 tab(s), Oral, hs, 3 refills    lisinopril 10 mg oral tablet, 10 mg= 1 tab(s), Oral, daily, 3 refills    NovoLOG 100 units/mL injectable solution, See Instructions, 3 refills    one touch delica lancets, See Instructions, 3 refills    Viagra 100 mg oral tablet, See Instructions  Allergies    ampicillin (Rash)    simvastatin (myalgias)  Social History    Smoking Status - 11/26/2019     Current every day smoker     Alcohol      Current, Beer (12 oz), 1-2 times per week, 2 drinks/episode average. 3 drinks/episode maximum., 12/16/2016     Employment/School      Employed, Work/School description: [employer] 08/31/2010     Tobacco - Current, 06/09/2010      Current, Cigarettes, 10 per day., 06/09/2010  Family History    Angioplasty: Father and Uncle (P).    CAD - Coronary artery disease: Father, Grandfather (P) and Uncle (P).    Heart attack: Grandfather (P).    Hypercholesterolemia: Father.    MI - Myocardial infarction: Uncle (P).    Psoriasis: Father.    Seizure: Sister and Sister.    Sleep apnea syndrome: Father, Sister and Sister.    Mother: History is negative    Sister: History is negative  Immunizations      Vaccine Date Status Comments      influenza virus vaccine, inactivated 10/03/2019 Given      influenza virus vaccine, inactivated 10/05/2018 Given      tetanus/diphth/pertuss (Tdap) adult/adol 02/15/2018 Given      influenza virus vaccine, inactivated 08/24/2017 Given      influenza virus vaccine, inactivated 12/16/2016 Given      influenza virus vaccine, inactivated 11/25/2015 Given      pneumococcal (PPSV23) 01/10/2014 Given      influenza virus vaccine, inactivated  09/27/2013 Given      influenza virus vaccine, inactivated 09/28/2012 Given      influenza virus vaccine, inactivated 10/28/2011 Given      influenza virus vaccine, inactivated 10/19/2010 Given      pneumococcal 10/09/2009 Recorded      influenza 10/09/2009 Recorded      tetanus/diphth/pertuss (Tdap) adult/adol 02/25/2008 Recorded      Td 07/18/1995 Recorded      MMR (measles/mumps/rubella) 04/06/1992 Recorded      OPV 07/16/1979 Recorded      DTP (obsolete) 07/16/1979 Recorded      OPV 12/17/1975 Recorded      DTP (obsolete) 10/08/1975 Recorded      MMR (measles/mumps/rubella) 10/08/1975 Recorded      OPV 02/21/1975 Recorded      DTP (obsolete) 02/21/1975 Recorded      OPV 1974 Recorded      DTP (obsolete) 1974 Recorded  Lab Results          Lab Results (Last 4 results within 90 days)           Sodium Level: 141 [135 mmol/L - 145 mmol/L] (11/26/19 09:43:00)          Sodium Level: 138 mmol/L [135 mmol/L - 146 mmol/L] (10/03/19 15:46:00)          Potassium Level: 4.0 [3.5 mmol/L - 5 mmol/L] (11/26/19 09:43:00)          Potassium Level: 3.9 mmol/L [3.5 mmol/L - 5.3 mmol/L] (10/03/19 15:46:00)          Chloride Level: 107 [98 mmol/L - 110 mmol/L] (11/26/19 09:43:00)          Chloride Level: 103 mmol/L [98 mmol/L - 110 mmol/L] (10/03/19 15:46:00)          CO2 Level: 25 [21 mmol/L - 31 mmol/L] (11/26/19 09:43:00)          CO2 Level: 26 mmol/L [20 mmol/L - 32 mmol/L] (10/03/19 15:46:00)          AGAP: 9 [5  - 18] (11/26/19 09:43:00)          Glucose Level: 135 High [65 mg/dL - 100 mg/dL] (11/26/19 09:43:00)          Glucose Level: 44 mg/dL Low [65 mg/dL - 99 mg/dL] (10/03/19 15:46:00)          BUN: 12 [8 mg/dL - 25 mg/dL] (11/26/19 09:43:00)          BUN: 18 mg/dL [7 mg/dL - 25 mg/dL] (10/03/19 15:46:00)          Creatinine Level: 0.99 [0.72 mg/dL - 1.25 mg/dL] (11/26/19 09:43:00)          Creatinine Level: 1.06 mg/dL [0.6 mg/dL - 1.35 mg/dL] (10/03/19 15:46:00)          BUN/Creat Ratio: 12 [10  - 20]  (11/26/19 09:43:00)          BUN/Creat Ratio: NOT APPLICABLE [6  - 22] (10/03/19 15:46:00)          eGFR: 84 mL/min/1.73m2 (10/03/19 15:46:00)          eGFR : >60 (11/26/19 09:43:00)          eGFR African American: 98 mL/min/1.73m2 (10/03/19 15:46:00)          eGFR Non-: >60 (11/26/19 09:43:00)          Calcium Level: 9.7 [8.5 mg/dL - 10.5 mg/dL] (11/26/19 09:43:00)          Calcium Level: 10.1 mg/dL [8.6 mg/dL - 10.3 mg/dL] (10/03/19 15:46:00)          Bilirubin Total: 0.3 [0.2 mg/dL - 1.2 mg/dL] (11/26/19 09:43:00)          Bilirubin Direct: 0.1 [0.1 mg/dL - 0.5 mg/dL] (11/26/19 09:43:00)          Bilirubin Indirect: 0.2 [0.2 mg/dL - 0.8 mg/dL] (11/26/19 09:43:00)          Alkaline Phosphatase: 105 [50 IU/L - 136 IU/L] (11/26/19 09:43:00)          AST/SGOT: 29 [2 IU/L - 40 IU/L] (11/26/19 09:43:00)          ALT/SGPT: 39 [8 IU/L - 45 IU/L] (11/26/19 09:43:00)          Protein Total: 7.1 [6 g/dL - 8 g/dL] (11/26/19 09:43:00)          Albumin Level: 4.4 [3.5 g/dL - 5.2 g/dL] (11/26/19 09:43:00)          Globulin: 2.7 [2 g/dL - 3.7 g/dL] (11/26/19 09:43:00)          A/G Ratio: 1.6 [1  - 2] (11/26/19 09:43:00)          U Microalbumin: 19 mg/dL (10/03/19 15:46:00)          Ur Creatinine: 117 mg/dL [20 mg/dL - 320 mg/dL] (10/03/19 15:46:00)          Ur Microalbumin/Creatinine Ratio: 162 High (10/03/19 15:46:00)          WBC: 7.2 [4.5  - 11] (11/26/19 09:43:00)          RBC: 4.56 [4.3  - 5.9] (11/26/19 09:43:00)          Hgb: 13.8 [13.5 g/dL - 17.5 g/dL] (11/26/19 09:43:00)          Hct: 39.7 [37 % - 53 %] (11/26/19 09:43:00)          MCV: 87 [80 fL - 100 fL] (11/26/19 09:43:00)          MCH: 30.3 [26 pg - 34 pg] (11/26/19 09:43:00)          MCHC: 34.8 [32 g/dL - 36 g/dL] (11/26/19 09:43:00)          RDW: 13.6 [11.5 % - 15.5 %] (11/26/19 09:43:00)          Platelet: 288 [140  - 440] (11/26/19 09:43:00)          MPV: 9.1 [6.5 fL - 11 fL] (11/26/19 09:43:00)          UA pH: 6.5 [5  - 8]  (11/26/19 09:40:00)          UA Specific Gravity: 1.02 [1.001  - 1.035] (11/26/19 09:40:00)          UA Glucose: NEGATIVE [NEGATIVE  - NEGATIVE] (11/26/19 09:40:00)          UA Bilirubin: NEGATIVE [NEGATIVE  - NEGATIVE] (11/26/19 09:40:00)          UA Ketones: NEGATIVE [NEGATIVE  - NEGATIVE] (11/26/19 09:40:00)          Urine Occult Blood: NEGATIVE [NEGATIVE  - NEGATIVE] (11/26/19 09:40:00)          UA Protein: 1+ Abnormal [NEGATIVE  - NEGATIVE] (11/26/19 09:40:00)          UA Nitrite: NEGATIVE [NEGATIVE  - NEGATIVE] (11/26/19 09:40:00)          UA Leukocyte Esterase: NEGATIVE [NEGATIVE  - NEGATIVE] (11/26/19 09:40:00)          UA Epithelial Cells: Few [None  - Few] (11/26/19 09:50:00)          UA Mucous: Present (11/26/19 09:50:00)          UA WBC: 0-2 [None  - 5] (11/26/19 09:50:00)          UA RBC: 3-5 [None  - 2] (11/26/19 09:50:00)          UA Bacteria: Few [None  - Few] (11/26/19 09:50:00)

## 2022-02-16 NOTE — PROGRESS NOTES
Chief Complaint    severe pain in the groin area, dull pain over the last couple of weeks, increased in severity the past 4-5 days  History of Present Illness       Patient is a 46-year-old male who complains of left-sided groin pain that has been worse over the last 2 days.       He does note a little bit of pain kind of off and on in this area for about a month.  No known injury.  For the last 2 days he has had a sudden increase in the pain.  Today he threw up because the pain was so bad.  The pain radiates down to his left testicle and then around to the anus.       He denies abdominal pain or back pain.       No blood in the urine       For about the last week he has felt kind of rundown although no fevers.       He is diabetic and his blood sugars have been fine       Up until today when he was throwing up he has been eating and drinking fine.       No concern about sexually transmitted infections  Review of Systems       Negative except as listed in HPI  Physical Exam   Vitals & Measurements    T: 98.4  F (Tympanic)  HR: 90 (Peripheral)  BP: 164/78  SpO2: 96%     HT: 65 in  WT: 181.7 lb  BMI: 30.23        Vitals noted and within normal limits       In general he is alert, oriented, and in mild distress as he is standing because the pain is worse when he sits        exam reveals normal circumcised male with no erythema or lesions       Bilateral descended testicles with no swelling or erythema       Tenderness to the left testicle posterior laterally at the epididymis       Tenderness to palpation of the left groin area.       No bulge or mass noted.       UA: positive for glucose, otherwise negative       testicular ultrasound: no torsion.  findings consistent with left orchitis  Assessment/Plan       Diabetes Type I (E10.9)                Epididymitis (N45.1)         Ordered:          Chlamydia/Neisseria gonorrhoeae RNA, TMA* ([facility]), Specimen Type: Urine, Collection Date: 10/15/21 13:30:00 CDT           Culture, Urine, Routine* ([facility]), Specimen Type: Urine (Clean Catch), Collection Date: 10/15/21 13:30:00 CDT          Urinalysis (Request), Priority: STAT, Groin pain  Epididymitis          US Testicular (Request), Priority: STAT, Instructions: left, Groin pain  Epididymitis                Groin pain (R10.30)         Ordered:          Chlamydia/Neisseria gonorrhoeae RNA, TMA* ([facility]), Specimen Type: Urine, Collection Date: 10/15/21 13:30:00 CDT          Culture, Urine, Routine* ([facility]), Specimen Type: Urine (Clean Catch), Collection Date: 10/15/21 13:30:00 CDT          Urinalysis (Request), Priority: STAT, Groin pain  Epididymitis          US Testicular (Request), Priority: STAT, Instructions: left, Groin pain  Epididymitis                Orchitis, left (N45.2)         cipro 500mg BID for 14 days        note done for work        Follow up if not improving as anticipated.          Ordered:          ciprofloxacin, = 1 tab(s) ( 500 mg ), Oral, q12 hrs, x 14 day(s), # 28 tab(s), 0 Refill(s), Type: Acute, Pharmacy: [facility], 1 tab(s) Oral q12 hrs,x14 day(s), 65, in, 10/15/21 13:18:00 CDT, Height Measured, 181.7, lb, 10/15/21 13:18:00 CDT, Weight Measured, (Ordered)           Patient Information     Name:LIDDLE, CHAD A      Address:      [address] [place] [id number]     Sex:Male     YOB: 1975     [phone]     Emergency Contact:LIDDLE, HEATHER M     MRN:[id number]     FIN:[id number]     Location:[facility]     Date of Service:10/15/2021      Primary Care Physician:       Tommy Damon MD, [phone]      Attending Physician:       Dimple Hurtado MD, [phone]  Problem List/Past Medical History    Ongoing     Diabetes Type I     Diabetic Retinopathy       Comments: previous laser treatment     DM neuropathy, type I diabetes mellitus     Dyslipidemia     Erectile Dysfunction     GERD (Gastroesophageal Reflux Disease)     history of diabeteic ketoacidosis      Obstructive sleep apnea syndrome, severe       Comments: On 9/23/19 AFUA 48.7 and oximetry eugenio 69% on HST.     Tobacco Abuse-Unspec    Historical     Hospitalized for Diabetes being out of control     Retinopathy due to Secondary Diabetes       Comments: Status post laser therapy (Dr. Sherif Charles)  Procedure/Surgical History     Colonoscopy (01/2015)      Comments: Colon, descending, polyp:  1.  Tubular adenoma  2.  No evidence of high grade dsyplasia  3.  Polyp size:5mm(resection/retrievel - complete)  - Repeat in 5yrs.  Medications    1ST TIER UNIFINE PE 32G X 4MM MISC 32 G X 4MM, See Instructions, 11 refills    aspirin 81 mg oral tablet, 81 mg= 1 tab(s), Oral, daily, 3 refills    Auto Titrating CPAP 6-16 for daily use, See Instructions, 11 refills    Cris Contour Next test strips, See Instructions, 3 refills    ciprofloxacin 500 mg oral tablet, 500 mg= 1 tab(s), Oral, q12 hrs    Crestor 10 mg oral tablet, 10 mg= 1 tab(s), Oral, hs, 3 refills    lisinopril 10 mg oral tablet, 10 mg= 1 tab(s), Oral, daily    NovoLOG 100 units/mL injectable solution, See Instructions, 1 refills    one touch delica lancets, See Instructions, 3 refills    ProstaScint 0.5 mg/mL intravenous solution  Allergies    ampicillin (Rash)    simvastatin (myalgias)  Social History    Smoking Status     Current every day smoker     Alcohol      Current, Beer (12 oz), 1-2 times per week, 2 drinks/episode average. 3 drinks/episode maximum.     Electronic Cigarette/Vaping      Electronic Cigarette Use: Never.     Employment/School      Employed, Work/School description: [employer].     Tobacco - Current      10 or more cigarettes (1/2 pack or more)/day in last 30 days  Family History    Angioplasty: Father and Uncle (P).    CAD - Coronary artery disease: Father, Grandfather (P) and Uncle (P).    Heart attack: Grandfather (P).    Hypercholesterolemia: Father.    MI - Myocardial infarction: Uncle (P).    Psoriasis: Father.    Seizure: Sister and  Sister.    Sleep apnea syndrome: Father, Sister and Sister.    Mother: History is negative    Sister: History is negative  Immunizations       Scheduled Immunizations       Dose Date(s)       influenza virus vaccine, inactivated       10/08/2020       MMR (measles/mumps/rubella)       04/06/1992, 10/08/1975       tetanus/diphth/pertuss (Tdap) adult/adol       02/25/2008       Other Immunizations               influenza       10/09/2009       pneumococcal       10/09/2009       DTP (obsolete)       1975, 02/21/1975, 10/08/1975, 07/16/1979       OPV       1975, 02/21/1975, 12/17/1975, 07/16/1979       influenza virus vaccine, inactivated       10/19/2010, 10/28/2011, 09/28/2012, 09/27/2013, 11/25/2015, 12/16/2016, 08/24/2017, 10/05/2018, 10/03/2019       pneumococcal (PPSV23)       01/10/2014       Td       07/18/1995       tetanus/diphth/pertuss (Tdap) adult/adol       02/16/2018       pneumococcal (PCV13)       08/21/2020

## 2022-02-16 NOTE — TELEPHONE ENCOUNTER
"---------------------  From: Mindy Montes LPN   Sent: 3/16/2020 4:59:25 PM CDT  Subject: General Message     Patient contacted clinic for advise as what to do being a \"high risk\" patient for Covid-19. Contacted patient and advised good hand washing and monitor for any fever or SOB.  "

## 2022-02-16 NOTE — LETTER
(Inserted Image. Unable to display)     July 15, 2019      CHAD LIDDLE  [address] [place], [place] [id number]          Dear QUITA,      Thank you for selecting [facility] (previously [place] [place] [facility]) for your healthcare needs.    Our records indicate you are due for the following services:    Diabetic Exam ~ Please bring your glucose meter and/or your blood glucose diary to your appointment.    Fasting Lab Tests ~ Please do not eat or drink anything 10 hours prior to your scheduled appointment time.  (Water and any medications that you may need are allowed unless directed otherwise.)    If you had your labs done at another facility or with Direct Access Lab Testing at [facility], please bring in a copy of the results to your next visit, mail a copy, or drop off a copy of your results to your Healthcare Provider.    You are due for lab work and an office visit; please schedule the lab appointment 1 week before the office visit.  This will assure all results are available to discuss with your provider during your visit.    **It is very helpful if you bring your medication bottles to your appointment.  This assures we have all of your current medications, including strength and dosing information, documented accurately in your medical record.    To schedule an appointment or if you have further questions, please contact your primary clinic:   [facility]       [phone]   [facility]       [phone]              [facility]     [phone]      Powered by [employer]    Sincerely,    Tommy Damon MD

## 2022-02-16 NOTE — CARE COORDINATION
Pt appears on  [facility]'s chronic disease panel as out of parameters for tobacco use.  Per [facility] 's Note:02-15-18 Smoking cessation counseling was given.  Maurisio Carrillo CMA.

## 2022-02-16 NOTE — PROGRESS NOTES
Patient:   LIDDLE, CHAD A            MRN: [id number]            FIN: [id number]               Age:   44 years     Sex:  Male     :  1975   Associated Diagnoses:   Tobacco Abuse-Unspec; Uncontrolled type I diabetes mellitus with proliferative retinopathy; Erectile dysfunction; Obstructive sleep apnea   Author:   Tommy Damon MD      Visit Information      Date of Service: 2020 04:35 pm  Performing Location: [facility]  Encounter#: [id number]      Primary Care Provider (PCP):  Tommy Damon MD    NPI# [id number]      Referring Provider:  Tommy Damon MD, NPI# [id number]      Chief Complaint   2020 4:41 PM CDT    f/u chronic              Additional Information:No additional information recorded during visit.   Chief complaint and symptoms as noted above and confirmed with patient.  Recent lab and diagnostic studies reviewed with patient      History of Present Illness   2014: Presents to clinic to establish care, previously seen by [facility]; though more recently transferred his care at [facility], now wishing to return care more locally.  He has a long-time history of type 1 diabetes, generally with a fairly noncompliant course.  He uses Lantus 28 units daily at bedtime and NovoLog 10-14 units per meal, though never checking pre-meal blood sugar before insulin bolus.  He s had issues with admissions for DKA in the past.  He is interested in potentially pursuing insulin pump.  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  He continues to smoke on a daily basis.  He s been experiencing upper left-sided chest and shoulder discomfort that is not related to exertion.  He s also been experiencing abdominal pains for the last several days, typical of what is experienced with diabetic ketoacidosis in the past.    10/3/2019: Taye returns to clinic related to his type  1 diabetes.  Overall doing well.  Last seen by Sarah in July and some changes made to pump settings.  Describes more unpredictable work schedule which has had some more lability on his blood sugars.  Has been working with urology and finding self penile injections to be quite helpful with his erectile dysfunction.  Brings up concerns related to daytime somnolence and heavy observed snoring by his wife.  Strong history of sleep apnea in the family.    1/8/2020:  Returns with ~6 week history of persistent RLQ abdominal pains, general bloating.  He was seen in clinic on 11/26 for same reasons, CT A/P obtained at that time without any acute abnormalities.  He's tried regular fiber and eliminated gluten from diet with some symptom improvement. He's still bothered by residual pains.     8/21/2020: Taye returns for follow-up.  Overall doing okay with his diabetes.  No longer using sensor on a regular basis.  He found to be more of a hassle with excessive alarming overnight.  He is reverted back to just using pump only which he prefers.  Brings up an episode back in late February where he describes his whole household having febrile respiratory illness.  He said several people at work became seriously sick including need for hospitalization.  He feels that the symptoms are most consistent with underlying coronavirus.  We did discuss potential merits of antibody testing.  He would like to check with insurance before pursuing.  He shares his oldest son just graduated from high school and enlisting in the [employer] and planning on submarine deployment.         Review of Systems   Constitutional:  No fever, No chills.    Eye:  Negative except as documented in history of present illness.    Ear/Nose/Mouth/Throat:  Negative except as documented in history of present illness.    Respiratory:  No shortness of breath.    Cardiovascular:  No palpitations, No peripheral edema, No syncope.    Gastrointestinal:  No nausea, No vomiting, No  abdominal pain.    Genitourinary:  ED, No dysuria, No hematuria.    Hematology/Lymphatics:  Negative except as documented in history of present illness.    Endocrine:  No excessive thirst, No polyuria.    Immunologic:  No recurrent fevers.    Musculoskeletal:  No joint pain, No muscle pain.    Neurologic:  Alert and oriented X4, Numbness, No tingling, No headache.    Psychiatric:  Depression.       Health Status   Allergies:    Allergic Reactions (Selected)  Severity Not Documented  Ampicillin (Rash)  Simvastatin (Myalgias)   Medications:  (Selected)   Prescriptions  Prescribed  1ST TIER UNIFINE PE 32G X 4MM MISC 32 G X 4MM: 1ST TIER UNIFINE PE 32G X 4MM MISC 32 G X 4MM, See Instructions, Instructions: Use to inject as needed., Supply, # 100 EA, 11 Refill(s), Type: Maintenance, Pharmacy: [facility], keep on file and pt will notify when needed, Use to...  Auto Titrating CPAP 6-16 for daily use: Auto Titrating CPAP 6-16 for daily use, See Instructions, Instructions: Heated humidifier x1; Humidifier chamber x1;  Heated tubing x1; Full face mask of choice with headgear  x1; Cushion x 1;  Filters: Disposable x1pk & Reusable x1pk.  Length of Need...  Cris Contour Next test strips: Cris Contour Next test strips, See Instructions, Instructions: testing 4-8 x/ day, Supply, # 800 EA, 3 Refill(s), Type: Maintenance, Pharmacy: [facility], keep on hold and pt will notify when needed, testing 4-8 x/ day  Crestor 10 mg oral tablet: = 1 tab(s) ( 10 mg ), po, hs, # 90 tab(s), 3 Refill(s), Type: Maintenance, Pharmacy: [facility], keep on file and pt will notify when needed, 1 tab(s) Oral hs  NovoLOG 100 units/mL injectable solution: See Instructions, Instructions: 75 units via pump daily, # 70 mL, 0 Refill(s), Type: Maintenance, Pharmacy: [facility], 75 units via pump daily, 65, in, 04/28/20 13:36:00 CDT, Height Measured, 187.8, lb, 01/08/20  15:09:00 CST, Guerrero...  aspirin 81 mg oral tablet: 1 tab(s) ( 81 mg ), PO, Daily, # 90 tab(s), 3 Refill(s), Type: Maintenance, Pharmacy: [facility], 1 tab(s) po daily  one touch delica lancets: one touch delica lancets, See Instructions, Instructions: change needle daily, Supply, # 3 box(es), 3 Refill(s), Type: Maintenance, Pharmacy: [facility], keep on hold and pt will notify when needed, change needle daily  Documented Medications  Documented  ProstaScint 0.5 mg/mL intravenous solution: 0 Refill(s), Type: Maintenance  lisinopril 10 mg oral tablet: = 1 tab(s) ( 10 mg ), Oral, daily, 0 Refill(s), Type: Maintenance,    Medications          *denotes recorded medication          Cris Contour Next test strips: See Instructions, testing 4-8 x/ day, 800 EA, 3 Refill(s).          1ST TIER UNIFINE PE 32G X 4MM MISC 32 G X 4MM: See Instructions, Use to inject as needed., 100 EA, 11 Refill(s).          one touch delica lancets: See Instructions, change needle daily, 3 box(es), 3 Refill(s).          Auto Titrating CPAP 6-16 for daily use: See Instructions, Heated humidifier x1; Humidifier chamber x1;  Heated tubing x1; Full face mask of choice with headgear  x1; Cushion x 1;  Filters: Disposable x1pk & Reusable x1pk.  Length of Need = 99 Months, 1 EA, 11 Refill(s).          aspirin 81 mg oral tablet: 81 mg, 1 tab(s), PO, Daily, 90 tab(s).          *ProstaScint 0.5 mg/mL intravenous solution: 0 Refill(s).          NovoLOG 100 units/mL injectable solution: See Instructions, 75 units via pump daily, 70 mL, 0 Refill(s).          *lisinopril 10 mg oral tablet: 10 mg, 1 tab(s), Oral, daily, 0 Refill(s).          Crestor 10 mg oral tablet: 10 mg, 1 tab(s), po, hs, 90 tab(s), 3 Refill(s).       Problem list:    All Problems  Diabetes Type I / ICD-9-.01 / Confirmed  DM neuropathy, type I diabetes mellitus / SNOMED CT 730918387 / Confirmed  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 / Confirmed  Dyslipidemia /  ICD-9-.4 / Confirmed  Erectile Dysfunction / ICD-9-.84 / Confirmed  Obstructive sleep apnea syndrome, severe / SNOMED CT 498532364 / Confirmed  Tobacco Abuse-Unspec / ICD-9-.1 / Confirmed  Inactive: GERD (Gastroesophageal Reflux Disease) / ICD-9-.81  Inactive: history of diabeteic ketoacidosis  Resolved: Hospitalized for Diabetes being out of control  Resolved: Retinopathy due to Secondary Diabetes / ICD-9-.50  Canceled: Dyslipidemia / ICD-9-.4  Canceled: Diabetes mellitus type I / SNOMED CT 844976097      Histories   Past Medical History:    Active  Diabetes Type I (250.01): Onset in the month of 9/1985 at 9 years  Tobacco Abuse-Unspec (305.1)  Resolved  Hospitalized for Diabetes being out of control: Onset in 1991 at 15 years.  Resolved.  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  Comments:  1/13/2014 CST 3:55 PM CST - Arianne HANEY, Nelly  Status post laser therapy (Dr. Sherif Charles)   Family History:    Sleep apnea syndrome  Father  Sister (Tere)  Sister (Melissa)  Psoriasis  Father  Heart attack  Grandfather (P)  Hypercholesterolemia  Father  Seizure  Sister (Melissa)  Sister (Tere)  Angioplasty  Father  Uncle (P)  MI - Myocardial infarction  Uncle (P)  CAD - Coronary artery disease  Grandfather (P)  Father  Uncle (P)     Procedure history:    Colonoscopy ([id number]) in the month of 1/2015 at 39 Years.  Comments:  7/1/2015 10:07 AM CDT - Monica CMA, [place]  Colon, descending, polyp:  1.  Tubular adenoma  2.  No evidence of high grade dsyplasia  3.  Polyp size:5mm(resection/retrievel - complete)  - Repeat in 5yrs   Social History:        Alcohol Assessment            Current, Beer (12 oz), 1-2 times per week, 2 drinks/episode average.  3 drinks/episode maximum.      Tobacco Assessment: Current            Current, Cigarettes, 10 per day.      Employment and Education Assessment            Employed, Work/School description: [employer].        Physical Examination   vital  signs stable, as noted above   Vital Signs   8/21/2020 4:41 PM CDT Temperature Tympanic 97.8 DegF  LOW    Peripheral Pulse Rate 100 bpm    Systolic Blood Pressure 120 mmHg    Diastolic Blood Pressure 76 mmHg    Mean Arterial Pressure 91 mmHg      Measurements from flowsheet : Measurements   8/21/2020 4:41 PM CDT Height Measured - Standard 65 in    Weight Measured - Standard 171.8 lb    BSA 1.89 m2    Body Mass Index 28.59 kg/m2  HI      General:  Alert and oriented, No acute distress.    Eye:  Extraocular movements are intact.    HENT:  Tympanic membranes are clear, No pharyngeal erythema.    Respiratory:  Lungs are clear to auscultation, Respirations are non-labored.    Cardiovascular:  Normal rate, Regular rhythm, No murmur, No edema.    Gastrointestinal:  Soft, Normal bowel sounds, wearing insulin pump.    Neurologic:  Alert, Oriented, Normal motor function, No focal deficits.    Cognition and Speech:  Oriented, Speech clear and coherent.    Psychiatric:  Appropriate mood & affect.       Review / Management   Results review:  Lab results   8/17/2020 8:44 AM CDT Hgb A1c 8.6  HI    Cholesterol 184 mg/dL    Non-    HDL 60 mg/dL    Chol/HDL Ratio 3.1      HI    Triglyceride 88 mg/dL   .       Impression and Plan   Diagnosis     Tobacco Abuse-Unspec (MEV29-ZJ Z72.0).     Uncontrolled type I diabetes mellitus with proliferative retinopathy (EJJ88-MC E10.3599).     Erectile dysfunction (PJL44-RC N52.9).     Obstructive sleep apnea (ZOV86-VY G47.33).         .) type I Dm, uncontrolled  hypoglycemic medications: none  insulin therapy: Medtronic insulin pump - has CGMS though not using   - ICR 1:6.5   - basal rate: 0.975-1.05   - sensitivity 30mg/dL   - working with Sarah  last HbA1c: 8.6%   microvascular complications: proliferative retinopathy, + DOROTHY, neuropathy   - doesn't think neuropathic pain needing medication at this time  macrovascular complications: none known  ASA/AIDEN/statin:  ASA 81mg daily,  lisinopril 10mg daily, rosuvastatin 10mg daily   - smoking cessation counseling    .) erectile dysfunction - almost certainly related to diabetic neuropathy/vascular disease   - poor response to Viagra and Cialis   - working with Urology; seeing a lot of success with penile injections    .) DAYANA, suspected  - high suspicions  - high [place] sleepiness index (daytime fatigue, snoring, insomnia, etc)    .) Covid19 pandemic  - discussed importance of social distancing, hand hygiene, and unique aspects related to individualized health  - discussed s/s and appropriate measures in context of worsening or developing respiratory symptoms   - he questions previous exposure/infection - discussed merits of serology testing - he wants to inquire with insurance    .) health maintenance  - previous colonoscopy in 2015; h/o tubular adenoma; due 2020  - updating adult vaccinations    RTC in 6 months         Professional Services   Counseling Summary:  This was a 25 minute visit with greater than 50% of that time spent counseling the patient.

## 2022-02-16 NOTE — NURSING NOTE
Comprehensive Intake Entered On:  12/15/2020 10:37 AM CST    Performed On:  12/15/2020 10:36 AM CST by Priya Yang CMA               Summary   Chief Complaint :   consent for video visit for COVID testing   Height Measured :   65 in(Converted to: 5 ft 5 in, 165.10 cm)    Diamante Yang CMAra - 12/15/2020 10:36 AM CST   Health Status   Allergies Verified? :   Yes   Medication History Verified? :   Yes   Immunizations Current :   Yes   Medical History Verified? :   Yes   Tobacco Use? :   Current every day smoker   Trey Priya WING - 12/15/2020 10:36 AM CST   Consents   Consent for Immunization Exchange :   Consent Granted   Consent for Immunizations to Providers :   Consent Granted   YangPriya dominguez CMA - 12/15/2020 10:36 AM CST   Meds / Allergies   (As Of: 12/15/2020 10:37:36 AM CST)   Allergies (Active)   ampicillin  Estimated Onset Date:   Unspecified ; Reactions:   Rash ; Created By:   Malika Wilson; Reaction Status:   Active ; Category:   Drug ; Substance:   ampicillin ; Type:   Allergy ; Updated By:   Malika Wilson; Reviewed Date:   12/15/2020 10:37 AM CST      simvastatin  Estimated Onset Date:   Unspecified ; Reactions:   myalgias ; Created By:   Srinivas Brown MD; Reaction Status:   Active ; Category:   Drug ; Substance:   simvastatin ; Type:   Allergy ; Updated By:   Srinivas Brown MD; Reviewed Date:   12/15/2020 10:37 AM CST        Medication List   (As Of: 12/15/2020 10:37:36 AM CST)   Prescription/Discharge Order    aspirin  :   aspirin ; Status:   Prescribed ; Ordered As Mnemonic:   aspirin 81 mg oral tablet ; Simple Display Line:   81 mg, 1 tab(s), PO, Daily, 90 tab(s) ; Ordering Provider:   Nelly Acuña MD; Catalog Code:   aspirin ; Order Dt/Tm:   1/10/2014 2:24:22 PM CST          insulin aspart  :   insulin aspart ; Status:   Prescribed ; Ordered As Mnemonic:   NovoLOG 100 units/mL injectable solution ; Simple Display Line:   See Instructions, 75 units via pump daily, 90 mL, 1  Refill(s) ; Ordering Provider:   Tommy Damon MD; Catalog Code:   insulin aspart ; Order Dt/Tm:   8/26/2020 9:21:05 AM CDT          Miscellaneous Prescription  :   Miscellaneous Prescription ; Status:   Prescribed ; Ordered As Mnemonic:   Cris Contour Next test strips ; Simple Display Line:   See Instructions, testing 4-8 x/ day, 800 EA, 3 Refill(s) ; Ordering Provider:   Tommy Damon MD; Catalog Code:   Miscellaneous Prescription ; Order Dt/Tm:   8/26/2020 9:21:05 AM CDT          Miscellaneous Rx Supply  :   Miscellaneous Rx Supply ; Status:   Prescribed ; Ordered As Mnemonic:   1ST TIER UNIFINE PE 32G X 4MM MISC 32 G X 4MM ; Simple Display Line:   See Instructions, Use to inject as needed., 100 EA, 11 Refill(s) ; Ordering Provider:   Tommy Damon MD; Catalog Code:   Miscellaneous Rx Supply ; Order Dt/Tm:   8/26/2020 9:21:06 AM CDT          Miscellaneous Rx Supply  :   Miscellaneous Rx Supply ; Status:   Prescribed ; Ordered As Mnemonic:   Auto Titrating CPAP 6-16 for daily use ; Simple Display Line:   See Instructions, Heated humidifier x1; Humidifier chamber x1;  Heated tubing x1; Full face mask of choice with headgear  x1; Cushion x 1;  Filters: Disposable x1pk & Reusable x1pk.  Length of Need = 99 Months, 1 EA, 11 Refill(s) ; Ordering Provider:   Alexander Melgar MD; Catalog Code:   Miscellaneous Rx Supply ; Order Dt/Tm:   11/8/2019 1:34:17 PM CST          Miscellaneous Rx Supply  :   Miscellaneous Rx Supply ; Status:   Prescribed ; Ordered As Mnemonic:   one touch delica lancets ; Simple Display Line:   See Instructions, change needle daily-testing up to 8 times daily, 400 EA, 3 Refill(s) ; Ordering Provider:   Tommy Damon MD; Catalog Code:   Miscellaneous Rx Supply ; Order Dt/Tm:   8/26/2020 9:21:07 AM CDT          rosuvastatin  :   rosuvastatin ; Status:   Prescribed ; Ordered As Mnemonic:   Crestor 10 mg oral tablet ; Simple Display Line:   10 mg, 1 tab(s), po, hs, 90 tab(s), 3 Refill(s) ; Ordering Provider:    Nelson HANEY, Tommy; Catalog Code:   rosuvastatin ; Order Dt/Tm:   8/26/2020 9:21:08 AM CDT            Home Meds    capromab pendetide  :   capromab pendetide ; Status:   Documented ; Ordered As Mnemonic:   ProstaScint 0.5 mg/mL intravenous solution ; Simple Display Line:   0 Refill(s) ; Catalog Code:   capromab pendetide ; Order Dt/Tm:   4/28/2020 1:36:07 PM CDT          lisinopril  :   lisinopril ; Status:   Documented ; Ordered As Mnemonic:   lisinopril 10 mg oral tablet ; Simple Display Line:   10 mg, 1 tab(s), Oral, daily, 0 Refill(s) ; Catalog Code:   lisinopril ; Order Dt/Tm:   4/28/2020 1:31:41 PM CDT            ID Risk Screen   Recent Travel History :   No recent travel   Family Member Travel History :   No recent travel   Other Exposure to Infectious Disease :   Community exposure to COVID-19 within the last 14 days   Priya Yang CMA - 12/15/2020 10:36 AM CST   Social History   Social History   (As Of: 12/15/2020 10:37:36 AM CST)   Alcohol:        Current, Beer (12 oz), 1-2 times per week, 2 drinks/episode average.  3 drinks/episode maximum.   (Last Updated: 12/16/2016 10:29:17 AM CST by Ebonie Anderson CMA)          Tobacco:  Current      Current, Cigarettes, 10 per day.   (Last Updated: 6/9/2010 3:17:18 PM CDT by Yani Santana CMA)   10 or more cigarettes (1/2 pack or more)/day in last 30 days   (Last Updated: 12/15/2020 10:37:33 AM CST by Priya Yang CMA)          Electronic Cigarette/Vaping:        Electronic Cigarette Use: Never.   (Last Updated: 12/15/2020 10:37:33 AM CST by Priya Yang CMA)          Employment/School:        Employed, Work/School description: [employer].   (Last Updated: 8/31/2010 3:21:41 PM CDT by Srinivas Brown MD)

## 2022-02-16 NOTE — TELEPHONE ENCOUNTER
---------------------  From: Karl King PA-C   To: [facility] ([id number]);     Sent: 10/28/2020 12:25:46 PM CDT  Subject: General Message     Please set up for C-19 testing    Marina

## 2022-02-16 NOTE — TELEPHONE ENCOUNTER
Entered by Cintia Huang on May 14, 2020 4:16:49 PM CDT  Patient must check work schedule before he is able to schedule appointment.  Patient will call back to schedule.      ---------------------  From: Ebonie Anderson CMA   To: Appointment Pool ([id number]);     Sent: 5/14/2020 3:53:29 PM CDT  Subject: General Message     please contact pt to set up for fasting labs and f/u visit with [facility] - ? video visit

## 2022-02-16 NOTE — TELEPHONE ENCOUNTER
---------------------  From: Ledy Ca   To: Alexander Melgar MD;     Sent: 11/1/2019 9:52:46 AM CDT  Subject: Referral Management     Patient has a home sleep study uploaded into [employer] ready for interpretation. Patients follow up appointment is scheduled for 11.8.19

## 2022-02-16 NOTE — CARE COORDINATION
Pt appears on [facility] chronic disease panel as out of parameters for A1C.  Pt seen 10/5/2018, had labs 9/28/18 A1C was 8.0. Pt has RTC 2/2019 for DM/fasting labs, A1C  Placed RTC for Sarah Gannon due now

## 2022-02-16 NOTE — NURSING NOTE
Comprehensive Intake Entered On:  6/11/2021 10:46 AM CDT    Performed On:  6/11/2021 10:40 AM CDT by Ebonie Anderson CMA               Summary   Chief Complaint :   Started not feeling well last nighht - cramping, lightheaded/nauseated, achy, h/a, fatigue - ? heat related    Weight Measured :   178.4 lb(Converted to: 178 lb 6 oz, 80.921 kg)    Height Measured :   65 in(Converted to: 5 ft 5 in, 165.10 cm)    Body Mass Index :   29.68 kg/m2 (HI)    Body Surface Area :   1.92 m2   Temperature Tympanic :   97.8 DegF(Converted to: 36.6 DegC)  (LOW)    Oxygen Saturation :   100 %   Ebonie Anderson CMA - 6/11/2021 10:40 AM CDT   Health Status   Allergies Verified? :   Yes   Medication History Verified? :   Yes   Immunizations Current :   Yes   Medical History Verified? :   Yes   Pre-Visit Planning Status :   Completed   Tobacco Use? :   Current every day smoker   Ebonie Anderson CMA - 6/11/2021 10:40 AM CDT   ID Risk Screen   Recent Travel History :   No recent travel   Family Member Travel History :   No recent travel   Other Exposure to Infectious Disease :   Unknown   COVID-19 Testing Status :   Positive COVID-19 test greater than 30 days ago   Ebonie Anderson CMA - 6/11/2021 10:40 AM CDT   Social History   Social History   (As Of: 6/11/2021 10:46:18 AM CDT)   Alcohol:        Current, Beer (12 oz), 1-2 times per week, 2 drinks/episode average.  3 drinks/episode maximum.   (Last Updated: 12/16/2016 10:29:17 AM CST by Ebonie Anderson CMA)          Tobacco:  Current      Current, Cigarettes, 10 per day.   (Last Updated: 6/9/2010 3:17:18 PM CDT by Yani Santana CMA)   10 or more cigarettes (1/2 pack or more)/day in last 30 days   (Last Updated: 12/15/2020 10:37:33 AM CST by Priya Yang CMA)          Electronic Cigarette/Vaping:        Electronic Cigarette Use: Never.   (Last Updated: 12/15/2020 10:37:33 AM CST by Priya Yang CMA)          Employment/School:        Employed, Work/School description: [employer].   (Last  Updated: 8/31/2010 3:21:41 PM CDT by Kevin HANEY, Srinivas)            More Vitals   Systolic Blood Pressure Supine :   155 mmHg   Diastolic Blood Pressure Supine :   85 mmHg   Systolic Blood Pressure Sitting :   145 mmHg   Diastolic Blood Pressure Sitting :   87 mmHg   Systolic Blood Pressure Standing :   160 mmHg   Diastolic Blood Pressure Standing :   81 mmHg   Pulse Supine :   83 bpm   Pulse Sitting :   76 bpm (HI)    Pulse Standing :   81 bpm   Ebonie Anderson CMA - 6/11/2021 10:40 AM CDT

## 2022-02-16 NOTE — NURSING NOTE
IV Hydration     1000 CC of  NS IV gtt     Start:1205          IV Site right hand   24g cath inserted without incident. Attempts x 3   Transparent dressing placed and line secured with tape.    Patient resting with call bell in place [id number]   IV meds: none      2nd liter of NS started at 1315   IV flow without complication. IV site free from discomfort, blanching, or swelling.   Patient resting and talking on phone at 1315IV D/C at 2:16pm. Catheter intact.

## 2022-02-16 NOTE — NURSING NOTE
Quick Intake Entered On:  7/11/2019 4:36 PM CDT    Performed On:  7/11/2019 4:36 PM CDT by Sindhu Gannon               Summary   Weight Measured :   175.6 lb(Converted to: 175 lb 10 oz, 79.65 kg)    Height Measured :   65 in(Converted to: 5 ft 5 in, 165.10 cm)    Body Mass Index :   29.22 kg/m2 (HI)    Body Surface Area :   1.91 m2   Sindhu Gannon - 7/11/2019 4:36 PM CDT

## 2022-02-16 NOTE — NURSING NOTE
Comprehensive Intake Entered On:  3/14/2019 4:40 PM CDT    Performed On:  3/14/2019 4:35 PM CDT by Charmaine Tubbs CMA               Summary   Chief Complaint :   DM check. Eye exam done today.   Weight Measured :   183 lb(Converted to: 183 lb 0 oz, 83.01 kg)    Systolic Blood Pressure :   152 mmHg (HI)    Diastolic Blood Pressure :   84 mmHg (HI)    Mean Arterial Pressure :   107 mmHg   Peripheral Pulse Rate :   88 bpm   BP Site :   Right arm   Pulse Site :   Radial artery   BP Method :   Manual   HR Method :   Manual   Temperature Tympanic :   98.1 DegF(Converted to: 36.7 DegC)    Charmaine Tubbs CMA - 3/14/2019 4:35 PM CDT   Health Status   Allergies Verified? :   Yes   Medication History Verified? :   Yes   Immunizations Current :   Yes   Pre-Visit Planning Status :   Completed   Charmaine Tubbs CMA - 3/14/2019 4:35 PM CDT   Meds / Allergies   (As Of: 3/14/2019 4:40:23 PM CDT)   Allergies (Active)   ampicillin  Estimated Onset Date:   Unspecified ; Reactions:   Rash ; Created By:   Malika Wilson CMA; Reaction Status:   Active ; Category:   Drug ; Substance:   ampicillin ; Type:   Allergy ; Updated By:   Malika Wilson CMA; Reviewed Date:   10/5/2018 10:52 AM CDT      simvastatin  Estimated Onset Date:   Unspecified ; Reactions:   myalgias ; Created By:   Srinivas Brown MD; Reaction Status:   Active ; Category:   Drug ; Substance:   simvastatin ; Type:   Allergy ; Updated By:   Srinivas Brown MD; Reviewed Date:   10/5/2018 10:52 AM CDT        Medication List   (As Of: 3/14/2019 4:40:23 PM CDT)   Prescription/Discharge Order    aspirin  :   aspirin ; Status:   Prescribed ; Ordered As Mnemonic:   aspirin 81 mg oral tablet ; Simple Display Line:   81 mg, 1 tab(s), PO, Daily, 90 tab(s) ; Ordering Provider:   Nelly Acuña MD; Catalog Code:   aspirin ; Order Dt/Tm:   1/10/2014 2:24:22 PM          insulin aspart  :   insulin aspart ; Status:   Prescribed ; Ordered As Mnemonic:   NovoLOG 100 units/mL injectable  solution ; Simple Display Line:   See Instructions, 75 units via pump daily, 70 mL, 3 Refill(s) ; Ordering Provider:   Tommy Damon MD; Catalog Code:   insulin aspart ; Order Dt/Tm:   10/5/2018 11:39:07 AM          lisinopril  :   lisinopril ; Status:   Prescribed ; Ordered As Mnemonic:   lisinopril 10 mg oral tablet ; Simple Display Line:   10 mg, 1 tab(s), PO, Daily, 90 tab(s), 3 Refill(s) ; Ordering Provider:   Tommy Damon MD; Catalog Code:   lisinopril ; Order Dt/Tm:   10/5/2018 11:12:30 AM          Miscellaneous Prescription  :   Miscellaneous Prescription ; Status:   Prescribed ; Ordered As Mnemonic:   Cris Contour Next test strips ; Simple Display Line:   See Instructions, testing 4-8 x/ day, 800 EA, 3 Refill(s) ; Ordering Provider:   Tommy Damon MD; Catalog Code:   Miscellaneous Prescription ; Order Dt/Tm:   2/15/2018 7:48:58 PM          Miscellaneous Rx Supply  :   Miscellaneous Rx Supply ; Status:   Prescribed ; Ordered As Mnemonic:   1ST TIER UNIFINE PE 32G X 4MM MISC 32 G X 4MM ; Simple Display Line:   See Instructions, Use to inject as needed., 100 EA, 11 Refill(s) ; Ordering Provider:   Tommy Damon MD; Catalog Code:   Miscellaneous Rx Supply ; Order Dt/Tm:   10/5/2018 11:39:13 AM          Miscellaneous Rx Supply  :   Miscellaneous Rx Supply ; Status:   Prescribed ; Ordered As Mnemonic:   one touch delica lancets ; Simple Display Line:   See Instructions, change needle daily, 3 box(es), 3 Refill(s) ; Ordering Provider:   Tommy Damon MD; Catalog Code:   Miscellaneous Rx Supply ; Order Dt/Tm:   10/5/2018 11:39:09 AM          rosuvastatin  :   rosuvastatin ; Status:   Prescribed ; Ordered As Mnemonic:   Crestor 10 mg oral tablet ; Simple Display Line:   10 mg, 1 tab(s), po, hs, 90 tab(s), 3 Refill(s) ; Ordering Provider:   Tommy Damon MD; Catalog Code:   rosuvastatin ; Order Dt/Tm:   10/5/2018 11:39:11 AM            Home Meds    ibuprofen  :   ibuprofen ; Status:   Documented ; Ordered As Mnemonic:    ibuprofen ; Simple Display Line:   0 Refill(s) ; Catalog Code:   ibuprofen ; Order Dt/Tm:   5/9/2016 5:01:36 PM          sildenafil  :   sildenafil ; Status:   Documented ; Ordered As Mnemonic:   Viagra 100 mg oral tablet ; Simple Display Line:   See Instructions, 1 tab(s)   1 hour before sexual activity, 6 tab(s), 0 Refill(s) ; Catalog Code:   sildenafil ; Order Dt/Tm:   9/25/2017 12:11:22 PM

## 2022-02-16 NOTE — TELEPHONE ENCOUNTER
---------------------  From: Karl King PA-C   To: Phone [facility] ([id number] - [place]);     Sent: 11/2/2020 9:18:37 AM CST  Subject: General Message     Please call with neg C-19 results.    KAHTime: 9:21 am  Note: Notified patient that COVID test was negative. Patient requests that we fax negative results to work. Will call with the fax number.---------------------  From: Mindy Baldwin CMA (Phone Messages Pool ([id number]))   To: Phone [facility] ([id number]_[place] - [place]);     Sent: 11/2/2020 9:25:14 AM CST  Subject: FW: General MessagePatient calls back with fax number [phone]. Created noted and faxed to his work notified patient this was done.

## 2022-02-16 NOTE — TELEPHONE ENCOUNTER
---------------------  From: Ofelia Valentine CMA   Sent: 10/28/2020 3:32:07 PM CDT  Subject: [facility] Testing     Pt was seen for covid testing at [facility] today per Karl King. 02 Sat=95%. Specimen sent to [facility] lab. Faxed forms to [facility].

## 2022-02-16 NOTE — LETTER
(Inserted Image. Unable to display)   [address] [place] [address]  December 05, 2021      CHAD LIDDLE  [address] [place] [address]        Dear QUITA,     Thank you for selecting [facility] ([facility]viously [facility]) for your healthcare needs. Below you will find the results of your recent test(s) done at our clinic.      Labs for your review.  A1c is suboptimal.  I think you'd benefit tremendously with functional pump + censor.        Result Name Current Result Previous Result Reference Range   Hgb A1c ((H)) 10.1 12/2/2021 ((H)) 9.2 6/11/2021  - <5.7   Cholesterol (mg/dL)  157 12/2/2021  184 8/17/2020  - <200   HDL (mg/dL)  63 12/2/2021  60 8/17/2020 > OR = 40 -    Triglyceride (mg/dL)  112 12/2/2021  88 8/17/2020  - <150   LDL  74 12/2/2021 ((H)) 106 8/17/2020    Cholesterol/HDL Ratio  2.5 12/2/2021  3.1 8/17/2020  - <5.0   Non-HDL Cholesterol  94 12/2/2021  124 8/17/2020  - <130       Please contact me or my assistant at [phone] if you have any questions or concerns.     Sincerely,        Tommy Damon MD    What do your labs mean?  Below is a glossary of commonly ordered labs:  LDL - Bad Cholesterol  HDL - Good Cholesterol  AST/ALT - Liver Function  Cr/Creatinine - Kidney Function  Microalbumin - Kidney Function  BUN - Kidney Function  PSA - Prostate   TSH - Thyroid Hormone  HgbA1c - Diabetes Test  Hgb (Hemoglobin) - Red Blood Cells

## 2022-02-16 NOTE — NURSING NOTE
Comprehensive Intake Entered On:  10/28/2021 5:57 PM CDT    Performed On:  10/28/2021 5:54 PM CDT by Ebonie Anderson CMA               Summary   Chief Complaint :   1.  f/u groin pain from 10/15 appt - started having pains on right side the 3-4 days.  Feeling bertter today    Weight Measured :   182.5 lb(Converted to: 182 lb 8 oz, 82.781 kg)    Height Measured :   65 in(Converted to: 5 ft 5 in, 165.10 cm)    Body Mass Index :   30.37 kg/m2 (HI)    Body Surface Area :   1.95 m2   Systolic Blood Pressure :   159 mmHg (HI)    Diastolic Blood Pressure :   84 mmHg (HI)    Mean Arterial Pressure :   109 mmHg   Peripheral Pulse Rate :   82 bpm   Temperature Tympanic :   97.8 DegF(Converted to: 36.6 DegC)  (LOW)    Oxygen Saturation :   98 %   Ebonie Anderson CMA - 10/28/2021 5:54 PM CDT   Health Status   Allergies Verified? :   Yes   Medication History Verified? :   Yes   Immunizations Current :   Yes   Medical History Verified? :   Yes   Pre-Visit Planning Status :   Completed   Ebonie Anderson CMA - 10/28/2021 5:54 PM CDT   Consents   Consent for Immunization Exchange :   Consent Granted   Consent for Immunizations to Providers :   Consent Granted   Ebonie Anderson CMA - 10/28/2021 5:54 PM CDT   Social History   Social History   (As Of: 10/28/2021 5:57:27 PM CDT)   Alcohol:        Current, Beer (12 oz), 1-2 times per week, 2 drinks/episode average.  3 drinks/episode maximum.   (Last Updated: 12/16/2016 10:29:17 AM CST by Ebonie Anderson CMA)          Tobacco:  Current      Current, Cigarettes, 10 per day.   (Last Updated: 6/9/2010 3:17:18 PM CDT by Yani Santana CMA)   10 or more cigarettes (1/2 pack or more)/day in last 30 days   (Last Updated: 12/15/2020 10:37:33 AM CST by Priya Yang CMA)          Electronic Cigarette/Vaping:        Electronic Cigarette Use: Never.   (Last Updated: 12/15/2020 10:37:33 AM CST by Priya Yang CMA)          Employment/School:        Employed, Work/School description: [employer].    (Last Updated: 8/31/2010 3:21:41 PM CDT by Kevin HANEY, Srinivas)

## 2022-02-16 NOTE — PROGRESS NOTES
Patient:   LIDDLE, CHAD A            MRN: [id number]            FIN: [id number]               Age:   43 years     Sex:  Male     :  1975   Associated Diagnoses:   Diabetes Type I; Dyslipidemia   Author:   Sindhu Gannon      Visit Information   Visit type:  Diabetes Self Management Education .    Referral source:  Nelson HANEY, Tommy.       Chief Complaint   Uncontrolled DM Type I       History of Present Illness   Pt has had TID for >30 years.  He is here today to start the medtronic 670G sensor he had went ~1 year with one in the box which the battery  and new transmitter ordered and pt just received it ~ a week ago.    Overall pt does like the pump and prefers it over MDI       Insulin pump settings     ICR= 12:00 4    Sensitivity= 25    Target 120 - 120     Basal: = 25.775  MN  0.975  05:00 1.10    Insulin action 3 hours  Max Bolus  20u  Routine DM care: eye exams with Charles -UTD, feet is aware to be cautious and wears steel toe shoes at work and well insulated boots in the winter, dental exam due, skin no concerns.    Exercise: no set routine but tries not to be sedentary  Stress: mod/ high work/ life balance       Health Status   Allergies:    Allergic Reactions (Selected)  Severity Not Documented  Ampicillin (Rash)  Simvastatin (Myalgias)   Medications:  (Selected)   Prescriptions  Prescribed  1ST TIER UNIFINE PE 32G X 4MM MISC 32 G X 4MM: 1ST TIER UNIFINE PE 32G X 4MM MISC 32 G X 4MM, See Instructions, Instructions: Use to inject as needed., Supply, # 100 EA, 11 Refill(s), Type: Maintenance, Pharmacy: [facility], keep on file and pt will notify when needed, Use to...  Cris Contour Next test strips: Cris Contour Next test strips, See Instructions, Instructions: testing 4-8 x/ day, Supply, # 800 EA, 3 Refill(s), Type: Maintenance, Pharmacy: [facility], testing 4-8 x/ day  Crestor 10 mg oral tablet: = 1 tab(s) ( 10 mg ), po, hs, # 90 tab(s), 3 Refill(s), Type:  Maintenance, Pharmacy: [facility], keep on file and pt will notify when needed, 1 tab(s) Oral hs  NovoLOG 100 units/mL injectable solution: See Instructions, Instructions: 75 units via pump daily, # 70 mL, 3 Refill(s), Type: Maintenance, Pharmacy: [facility], 75 units via pump daily  aspirin 81 mg oral tablet: 1 tab(s) ( 81 mg ), PO, Daily, # 90 tab(s), 3 Refill(s), Type: Maintenance, Pharmacy: [facility], 1 tab(s) po daily  lisinopril 10 mg oral tablet: = 1 tab(s) ( 10 mg ), PO, Daily, # 90 tab(s), 3 Refill(s), Type: Maintenance, Pharmacy: [facility], 1 tab(s) Oral daily  one touch delica lancets: one touch delica lancets, See Instructions, Instructions: change needle daily, Supply, # 3 box(es), 3 Refill(s), Type: Maintenance, Pharmacy: [facility], change needle daily  Documented Medications  Documented  Viagra 100 mg oral tablet: See Instructions, Instructions: 1 tab(s)   1 hour before sexual activity, # 6 tab(s), 0 Refill(s), Type: Maintenance, Written Rx per Dr Prater  ibuprofen: 0 Refill(s), Type: Maintenance   Problem list:    All Problems  Diabetes Type I / ICD-9-.01 / Confirmed  DM neuropathy, type I diabetes mellitus / SNOMED CT 967338267 / Confirmed  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 / Confirmed  Dyslipidemia / ICD-9-.4 / Confirmed  Erectile Dysfunction / ICD-9-.84 / Confirmed  Tobacco Abuse-Unspec / ICD-9-.1 / Confirmed  Inactive: GERD (Gastroesophageal Reflux Disease) / ICD-9-.81  Inactive: history of diabeteic ketoacidosis  Resolved: Hospitalized for Diabetes being out of control  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  Resolved.  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  Comments:  1/13/2014 CST 3:55 PM CST - Nelly Acuña MD  Status post laser therapy (Dr. Sherif Charles)   Family History:    Psoriasis  Father  Heart attack  Grandfather (P)  Hypercholesterolemia  Father  Seizure  Sister (Melissa)  Sister (Tere)  Angioplasty  Father  Uncle (P)  MI - Myocardial infarction  Uncle (P)  CAD - Coronary artery disease  Grandfather (P)  Father  Uncle (P)     Procedure history:    Colonoscopy (SNOMED CT 833846604) performed by Dalton Lee in the month of 1/2015 at 39 Years.  Comments:  7/1/2015 10:07 AM CDT - Ebonie Anderson CMA  Colon, descending, polyp:  1.  Tubular adenoma  2.  No evidence of high grade dsyplasia  3.  Polyp size:5mm(resection/retrievel - complete)  - Repeat in 5yrs   Social History:        Alcohol Assessment            Current, Beer (12 oz), 1-2 times per week, 2 drinks/episode average.  3 drinks/episode maximum.      Tobacco Assessment: Current            Current, Cigarettes, 10 per day.      Employment and Education Assessment            Employed, Work/School description: [employer].      Physical Examination   VS/Measurements      Health Maintenance      Recommendations     Pending (in the next year)        OverDue           DM - Foot Exam due  05/17/14  and every 1  year(s)        Due            Exercise due  06/13/19  and every 1  year(s)           Preventative Services due  06/13/19  and every 5  year(s)        Near Due            DM - HgbA1c near due  07/15/19  and every 3  month(s)        Due In Future            Influenza Vaccine not due until  09/01/19  and every 1  year(s)           DM - Microalbumin not due until  09/28/19  and every 1  year(s)           DM - Eye Exam not due until  03/14/20  and every 1  year(s)           Alcohol Misuse Screen (Male) not due until  03/14/20  and every 1  year(s)            Depression Screen (Male) not due until  03/14/20  and every 1  year(s)           High Blood Pressure Screen (Male) not due until  03/14/20  and every 1  year(s)           Lipid Disorders Screen (Male) not due until  03/14/20  and every 1  year(s)           Type 2 Diabetes Mellitus Screen (Male) not due until  04/15/20  and every 1  year(s)           Body Mass Index Check (Male) not due until  06/10/20  and every 1  year(s)     Satisfied (in the past 1 year)        Satisfied            Alcohol Misuse Screen (Male) on  03/14/19.           Body Mass Index Check (Male) on  06/10/19.           Body Mass Index Check (Male) on  02/21/19.           DM - Eye Exam on  03/14/19.           DM - HgbA1c on  04/15/19.           DM - HgbA1c on  03/14/19.           DM - HgbA1c on  09/28/18.           DM - Microalbumin on  09/28/18.           DM - Microalbumin on  09/28/18.           Depression Screen (Male) on  03/14/19.           Depression Screen (Male) on  03/14/19.           High Blood Pressure Screen (Male) on  03/14/19.           High Blood Pressure Screen (Male) on  10/05/18.           High Blood Pressure Screen (Male) on  09/28/18.           Influenza Vaccine on  10/05/18.           Lipid Disorders Screen (Male) on  03/14/19.           Type 2 Diabetes Mellitus Screen (Male) on  04/15/19.           Type 2 Diabetes Mellitus Screen (Male) on  03/14/19.           Type 2 Diabetes Mellitus Screen (Male) on  09/28/18.        Review / Management   Results review:  Lab results   4/15/2019 8:42 AM CDT Hgb A1c 8.3  HI    3/14/2019 5:21 PM CDT Hgb A1c See comment (Modified)    LDL Direct 137 mg/dL  HI (Modified)   .       Impression and Plan   Diagnosis     Diabetes Type I (KCT50-AI E10.9).     Dyslipidemia (KIW78-AX E78.5).       Counseled: AADE7 Self Care Behaviors, CGMS initial instruction    The sensor continuously converts tiny amounts of glucose from the interstitial fluid under the skin into an electronic signal.  The CGMS  system then use these signals to provide sensor glucose values.    Components discussed   one press serter, glucose sensor assembly, sensor base, transmitter, and tape and sensor components.  Pt was able to insert sensor following written directions.  Automode turned on because pt does want it today despite recommendations for automode to be started in 1 week.  Pt will receive alarms when low.      Pt started sensor successfully   Sensor start up discussed and instruction manual reviewed.  High/ low settings and alerts entered into pump.      Healthy Eating - carbohydrate counting, reading food labels, appropriate portion sizes, well balanced meals, diabetic plate method as a general rule.  Follow Therapeutic Lifestyle Changes (TLC) nutrition plan for heart healthy eating.  Discussed portion control and mindful eating.    Being Active - Incorporate at least 30+min 5+ days/ week.    Monitoring - Pt is instructed on recommended testing schedule and blood glucose target levels.    Taking Medication - Discussed bolusing prior to meals/ counting carbs   Problem Solving - medical support team assistance, resources provided (written and apps, internet); good control of stress  Healthy Coping - support of friends, family, and medical support team   Reducing Risks - Recommend routine eye and dental apts, adequate sleep, importance of controlling BG to prevent developing complications related to uncontrolled DM including nephropathy, neuropathy, retinopathy, and cardiovascular disease.  Discussed importance of proper skin, dental, foot and eye care.  Weight loss goal of 7 - 10% of current body weight pounds as a reasonable goal to help increase insulin sensitivity     Insulin Pump - no changes made.  Education again on using temp basal rates.      We discussed how these interventions relate overall to being healthy and having good control of blood glucose levels, lipid values, blood pressure, and promotes a healthier weight.      ICR= 12:00 4    Sensitivity= 25    Target 120 - 120     Basal: = 25.775  MN  0.975  05:00 1.10    Insulin action 3 hours  Max Bolus  20u     Goals:   1.  Practice healthy stress management and get good quality sleep with the goal of 7-8 hours per night.    2.   Physical activity: Recommend 30 min of physical activity on 5 or more days/ week.    3.  Eat in a healthy way, per diabetic plate method.  Nutrition reference: Eat 3 meals a day; snacks are optional.  A meal is three or more food groups; make it colorful for better nutrition.    4. Count carbohydrates and use bolus wizard for all meals and snacks   5.  BG testing 3-4+x/ day initially with pump start Goal pre meals 80 - 120; 2 hrs after meals 80 - 150   6.  Be aware of s/sx of hypo/ hyperglycemia and follow treatment protocol   7.  Always have back up pump supplies on hand and vial/ syringe with battery and glucose tablets   8.  follow up in 6-8 weeks for pump/ sensor download          Professional Services   Time spent with pt 60 min   cc Dr. DASHA Damon

## 2022-02-16 NOTE — PROGRESS NOTES
Patient:   LIDDLE, CHAD A            MRN: [id number]            FIN: [id number]               Age:   45 years     Sex:  Male     :  1975   Associated Diagnoses:   None   Author:   Karl King PA-C       -   Today's date:  10/28/2020 12:26:00 PM .        -   To whom it may concern:        This patient is currently under my care and Was seen in my office on  10/28/2020 12:26:00 PM.  .     Please excuse him/ her from work, Until further notice. Exposure to C-19. Testing pending/performed today..      -   Best regards,

## 2022-02-16 NOTE — TELEPHONE ENCOUNTER
---------------------  From: Vasquez Parekh   To: Salbador De Oliveira MD;     Sent: 12/17/2020 9:16:19 AM CST  Subject: Covid results     Called pt about his POSITIVE covid results. Pt still feels under the weather but hasn't gotten worse. Told pt that [facility] should be contacting him within the next few days for further instructions and to stay quarantined until then. Pt understood and had no questions at this time

## 2022-02-16 NOTE — NURSING NOTE
"Pt LVM at 1118 stating he is \"still under the weather\" and his blood sugars are elevated since his visit with [facility] on Monday.    I called pt back at [phone] and he said his throat isn't as sore and sinuses are a little less congested but he is more congested in the lungs; He also said his intake of food has not changed and his blood sugars have been elevated for the past few days.    I advised pt to come in for re-evaluation; pt agreed and was transferred to appt line.  "

## 2022-02-16 NOTE — NURSING NOTE
Depression Screening Entered On:  12/2/2021 6:15 PM CST    Performed On:  12/2/2021 6:14 PM CST by Ebonie Anderson CMA               Depression Screening   Little Interest - Pleasure in Activities :   Not at all   Feeling Down, Depressed, Hopeless :   Not at all   Initial Depression Screen Score :   0 Score   Poor Appetite or Overeating :   Not at all   Trouble Falling or Staying Asleep :   Several days   Feeling Tired or Little Energy :   Several days   Feeling Bad About Yourself :   Not at all   Trouble Concentrating :   Not at all   Moving or Speaking Slowly :   Not at all   Thoughts Better Off Dead or Hurting Self :   Not at all   Difficulty at Work, Home, Getting Along :   Not difficult at all   Detailed Depression Screen Score :   2    Total Depression Screen Score :   2    Ebonie Anderson CMA - 12/2/2021 6:14 PM CST

## 2022-02-16 NOTE — CARE COORDINATION
Pt appears on [facility] chronic disease panel as out of parameters for A1C.  Pt seen 10/5/2018, had labs 9/28/18 A1C was 8.0. Pt has RTC 2/2019 for DM/fasting labs, A1C.  Pt has appt with DS 2/21/19, will need fasting labs and A1C

## 2022-02-16 NOTE — PROGRESS NOTES
Patient:   LIDDLE, CHAD A            MRN: [id number]            FIN: [id number]               Age:   43 years     Sex:  Male     :  1975   Associated Diagnoses:   Diabetes Type I; Dyslipidemia   Author:   Sindhu Gannon      Visit Information   Visit type:  Diabetes Self Management Education .    Referral source:  Nelson HANEY, Tommy.       Chief Complaint   Uncontrolled DM Type I       History of Present Illness   Pt has had TID for >30 years.    Pt started 670G pump 17  Pt started CGMS with auto mode 6/10/19, he is here today to download pump/ sensor     Auto Mode 78% per week  Sensor Wear 85% per week    Average sensor glucose 172 +/- 67 mg/dL  Calibration/ day 3 (target 3)  BG testing/day 8.5    Insulin pump settings     ICR= 12:00 5 changed to 6.5    Sensitivity= 25 changed to 30    Target 120 - 120     Basal: = 25.775  MN  0.975  05:00 1.10    Insulin action 3 hours  Max Bolus  20u    Routine DM care: eye exams with Charles -UTD 3/14/19 quiescent proliferative retinopathy, feet is aware to be cautious and wears steel toe shoes at work and well insulated boots in the winter, sandals in the summer, dental exam due, skin no concerns.    Exercise: no set routine but tries not to be sedentary  Stress: mod/ high work/ life balance       Health Status   Allergies:    Allergic Reactions (Selected)  Severity Not Documented  Ampicillin (Rash)  Simvastatin (Myalgias)   Medications:  (Selected)   Prescriptions  Prescribed  1ST TIER UNIFINE PE 32G X 4MM MISC 32 G X 4MM: 1ST TIER UNIFINE PE 32G X 4MM MISC 32 G X 4MM, See Instructions, Instructions: Use to inject as needed., Supply, # 100 EA, 11 Refill(s), Type: Maintenance, Pharmacy: [facility], keep on file and pt will notify when needed, Use to...  Cris Contour Next test strips: Cris Contour Next test strips, See Instructions, Instructions: testing 4-8 x/ day, Supply, # 800 EA, 3 Refill(s), Type: Maintenance, Pharmacy: [facility], testing 4-8 x/ day  Crestor 10 mg oral tablet: = 1 tab(s) ( 10 mg ), po, hs, # 90 tab(s), 3 Refill(s), Type: Maintenance, Pharmacy: [facility], keep on file and pt will notify when needed, 1 tab(s) Oral hs  NovoLOG 100 units/mL injectable solution: See Instructions, Instructions: 75 units via pump daily, # 70 mL, 3 Refill(s), Type: Maintenance, Pharmacy: [facility], 75 units via pump daily  aspirin 81 mg oral tablet: 1 tab(s) ( 81 mg ), PO, Daily, # 90 tab(s), 3 Refill(s), Type: Maintenance, Pharmacy: [facility], 1 tab(s) po daily  lisinopril 10 mg oral tablet: = 1 tab(s) ( 10 mg ), PO, Daily, # 90 tab(s), 3 Refill(s), Type: Maintenance, Pharmacy: [facility], 1 tab(s) Oral daily  one touch delica lancets: one touch delica lancets, See Instructions, Instructions: change needle daily, Supply, # 3 box(es), 3 Refill(s), Type: Maintenance, Pharmacy: [facility], change needle daily  Documented Medications  Documented  Viagra 100 mg oral tablet: See Instructions, Instructions: 1 tab(s)   1 hour before sexual activity, # 6 tab(s), 0 Refill(s), Type: Maintenance, Written Rx per Dr Prater  ibuprofen: 0 Refill(s), Type: Maintenance   Problem list:    All Problems  Diabetes Type I / ICD-9-.01 / Confirmed  DM neuropathy, type I diabetes mellitus / SNOMED CT 152911461 / Confirmed  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 / Confirmed  Dyslipidemia / ICD-9-.4 / Confirmed  Erectile Dysfunction / ICD-9-.84 / Confirmed  Tobacco Abuse-Unspec / ICD-9-.1 / Confirmed  Inactive: GERD (Gastroesophageal Reflux Disease) / ICD-9-.81  Inactive: history of diabeteic ketoacidosis  Resolved: Hospitalized for Diabetes being out of control  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  Resolved.  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  Comments:  1/13/2014 CST 3:55 PM CST - Arianne HANEY, Nelly  Status post laser therapy (Dr. Sherif Charles)   Family History:    Psoriasis  Father  Heart attack  Grandfather (P)  Hypercholesterolemia  Father  Seizure  Sister (Melissa)  Sister (Tere)  Angioplasty  Father  Uncle (P)  MI - Myocardial infarction  Uncle (P)  CAD - Coronary artery disease  Grandfather (P)  Father  Uncle (P)     Procedure history:    Colonoscopy (SNOMED CT 825796345) performed by Dalton Lee in the month of 1/2015 at 39 Years.  Comments:  7/1/2015 10:07 AM CDT - Monica WING, Ebonie  Colon, descending, polyp:  1.  Tubular adenoma  2.  No evidence of high grade dsyplasia  3.  Polyp size:5mm(resection/retrievel - complete)  - Repeat in 5yrs   Social History:        Alcohol Assessment            Current, Beer (12 oz), 1-2 times per week, 2 drinks/episode average.  3 drinks/episode maximum.      Tobacco Assessment: Current            Current, Cigarettes, 10 per day.      Employment and Education Assessment            Employed, Work/School description: [employer].      Physical Examination   Measurements from flowsheet : Measurements   7/11/2019 4:36 PM CDT Height Measured - Standard 65 in    Weight Measured - Standard 175.6 lb    BSA 1.91 m2    Body Mass Index 29.22 kg/m2  HI         Health Maintenance      Recommendations     Pending (in the next year)        OverDue           DM - Foot Exam due  05/17/14  and every 1  year(s)        Due            Exercise due  07/11/19  and every 1  year(s)           Preventative Services due  07/11/19  and every 5  year(s)        Near Due            DM - HgbA1c near due  07/15/19  and every 3  month(s)           Influenza Vaccine near due   09/01/19  and every 1  year(s)        Due In Future            DM - Microalbumin not due until  09/28/19  and every 1  year(s)           DM - Eye Exam not due until  03/14/20  and every 1  year(s)           Alcohol Misuse Screen (Male) not due until  03/14/20  and every 1  year(s)           Depression Screen (Male) not due until  03/14/20  and every 1  year(s)           High Blood Pressure Screen (Male) not due until  03/14/20  and every 1  year(s)           Lipid Disorders Screen (Male) not due until  03/14/20  and every 1  year(s)           Type 2 Diabetes Mellitus Screen (Male) not due until  04/15/20  and every 1  year(s)     Satisfied (in the past 1 year)        Satisfied            Alcohol Misuse Screen (Male) on  03/14/19.           Body Mass Index Check (Male) on  07/11/19.           Body Mass Index Check (Male) on  06/10/19.           Body Mass Index Check (Male) on  02/21/19.           DM - Eye Exam on  03/14/19.           DM - HgbA1c on  04/15/19.           DM - HgbA1c on  03/14/19.           DM - HgbA1c on  09/28/18.           DM - Microalbumin on  09/28/18.           DM - Microalbumin on  09/28/18.           Depression Screen (Male) on  03/14/19.           Depression Screen (Male) on  03/14/19.           High Blood Pressure Screen (Male) on  03/14/19.           High Blood Pressure Screen (Male) on  10/05/18.           High Blood Pressure Screen (Male) on  09/28/18.           Influenza Vaccine on  10/05/18.           Lipid Disorders Screen (Male) on  03/14/19.           Type 2 Diabetes Mellitus Screen (Male) on  04/15/19.           Type 2 Diabetes Mellitus Screen (Male) on  03/14/19.           Type 2 Diabetes Mellitus Screen (Male) on  09/28/18.        Review / Management   Results review:  Lab results   6/19/2019 8:36 AM CDT Testoster Tot .46 ng/dL    Testosterone Free TR 7.3 ng/dL   4/15/2019 8:42 AM CDT Hgb A1c 8.3  HI   .       Impression and Plan   Diagnosis     Diabetes Type I (GMO51-EA  E10.9).     Dyslipidemia (JNQ01-PS E78.5).       Counseled: AADE7 Self Care Behaviors    Healthy Eating - carbohydrate counting, reading food labels, appropriate portion sizes, well balanced meals, diabetic plate method as a general rule.  Follow Therapeutic Lifestyle Changes (TLC) nutrition plan for heart healthy eating.  Discussed portion control and mindful eating.    Being Active - Incorporate at least 30+min 5+ days/ week.    Monitoring - Pt is instructed on recommended testing schedule and blood glucose target levels.    Taking Medication - Discussed importance to bolus 15+ min prior to meals/ counting carbs   Problem Solving - Education on active mode for at work (temp target of 150), pt decreases bolus for correction and for carbs, settings changed on pump today, medical support team assistance, resources provided (written and apps, internet); good control of stress  Healthy Coping - support of friends, family, and medical support team   Reducing Risks - Recommend routine eye and dental apts, adequate sleep, importance of controlling BG to prevent developing complications related to uncontrolled DM including nephropathy, neuropathy, retinopathy, and cardiovascular disease.  Discussed importance of proper skin, dental, foot and eye care.  Weight loss goal of 7 - 10% of current body weight pounds as a reasonable goal to help increase insulin sensitivity     Insulin Pump -changes made.  Temp active basal rate to use at work as needed.      We discussed how these interventions relate overall to being healthy and having good control of blood glucose levels, lipid values, blood pressure, and promotes a healthier weight.        Goals:   1.  Practice healthy stress management and get good quality sleep with the goal of 7-8 hours per night.    2.   Physical activity: Recommend 30 min of physical activity on 5 or more days/ week.    3.  Eat in a healthy way, per diabetic plate method.  Nutrition reference: Eat 3 meals  a day; snacks are optional.  A meal is three or more food groups; make it colorful for better nutrition.    4. Count carbohydrates and use bolus wizard for all meals and snacks   5.  BG testing 3-4+x/ day initially with pump start Goal pre meals 80 - 120; 2 hrs after meals 80 - 150   6.  Be aware of s/sx of hypo/ hyperglycemia and follow treatment protocol   7.  Always have back up pump supplies on hand and vial/ syringe with battery and glucose tablets   8.  follow up in 3-6 mo for pump/ sensor download          Professional Services   Time spent with pt 30 min   cc Dr. DASHA Damon

## 2022-02-16 NOTE — NURSING NOTE
Comprehensive Intake Entered On:  10/28/2020 11:58 AM CDT    Performed On:  10/28/2020 11:56 AM CDT by Mindy Baldwin CMA   Chief Complaint :   Exposure to COVID, asymptomatic; verbal consent given for telephone visit   Height Measured :   65 in(Converted to: 5 ft 5 in, 165.10 cm)    Mindy Baldwin CMA - 10/28/2020 11:56 AM CDT   Health Status   Allergies Verified? :   Yes   Medication History Verified? :   Yes   Immunizations Current :   Yes   Medical History Verified? :   Yes   Mindy Baldwin CMA - 10/28/2020 11:56 AM CDT   Consents   Consent for Immunization Exchange :   Consent Granted   Consent for Immunizations to Providers :   Consent Granted   Mindy Baldwin CMA - 10/28/2020 11:56 AM CDT   Meds / Allergies   (As Of: 10/28/2020 11:58:26 AM CDT)   Allergies (Active)   ampicillin  Estimated Onset Date:   Unspecified ; Reactions:   Rash ; Created By:   Malika Wilson; Reaction Status:   Active ; Category:   Drug ; Substance:   ampicillin ; Type:   Allergy ; Updated By:   Malika Wilson; Reviewed Date:   10/28/2020 11:57 AM CDT      simvastatin  Estimated Onset Date:   Unspecified ; Reactions:   myalgias ; Created By:   Srinivas Brown MD; Reaction Status:   Active ; Category:   Drug ; Substance:   simvastatin ; Type:   Allergy ; Updated By:   Srinivas Brown MD; Reviewed Date:   10/28/2020 11:57 AM CDT        Medication List   (As Of: 10/28/2020 11:58:26 AM CDT)   Prescription/Discharge Order    aspirin  :   aspirin ; Status:   Prescribed ; Ordered As Mnemonic:   aspirin 81 mg oral tablet ; Simple Display Line:   81 mg, 1 tab(s), PO, Daily, 90 tab(s) ; Ordering Provider:   Nelly Acuña MD; Catalog Code:   aspirin ; Order Dt/Tm:   1/10/2014 2:24:22 PM CST          insulin aspart  :   insulin aspart ; Status:   Prescribed ; Ordered As Mnemonic:   NovoLOG 100 units/mL injectable solution ; Simple Display Line:   See Instructions, 75 units via pump daily, 90 mL, 1 Refill(s) ; Ordering Provider:    Tommy Damon MD; Catalog Code:   insulin aspart ; Order Dt/Tm:   8/26/2020 9:21:05 AM CDT          Miscellaneous Prescription  :   Miscellaneous Prescription ; Status:   Prescribed ; Ordered As Mnemonic:   Cris Contour Next test strips ; Simple Display Line:   See Instructions, testing 4-8 x/ day, 800 EA, 3 Refill(s) ; Ordering Provider:   Tommy Damon MD; Catalog Code:   Miscellaneous Prescription ; Order Dt/Tm:   8/26/2020 9:21:05 AM CDT          Miscellaneous Rx Supply  :   Miscellaneous Rx Supply ; Status:   Prescribed ; Ordered As Mnemonic:   1ST TIER UNIFINE PE 32G X 4MM MISC 32 G X 4MM ; Simple Display Line:   See Instructions, Use to inject as needed., 100 EA, 11 Refill(s) ; Ordering Provider:   Tommy Damon MD; Catalog Code:   Miscellaneous Rx Supply ; Order Dt/Tm:   8/26/2020 9:21:06 AM CDT          Miscellaneous Rx Supply  :   Miscellaneous Rx Supply ; Status:   Prescribed ; Ordered As Mnemonic:   Auto Titrating CPAP 6-16 for daily use ; Simple Display Line:   See Instructions, Heated humidifier x1; Humidifier chamber x1;  Heated tubing x1; Full face mask of choice with headgear  x1; Cushion x 1;  Filters: Disposable x1pk & Reusable x1pk.  Length of Need = 99 Months, 1 EA, 11 Refill(s) ; Ordering Provider:   Alexander Melgar MD; Catalog Code:   Miscellaneous Rx Supply ; Order Dt/Tm:   11/8/2019 1:34:17 PM CST          Miscellaneous Rx Supply  :   Miscellaneous Rx Supply ; Status:   Prescribed ; Ordered As Mnemonic:   one touch delica lancets ; Simple Display Line:   See Instructions, change needle daily-testing up to 8 times daily, 400 EA, 3 Refill(s) ; Ordering Provider:   Tommy Damon MD; Catalog Code:   Miscellaneous Rx Supply ; Order Dt/Tm:   8/26/2020 9:21:07 AM CDT          rosuvastatin  :   rosuvastatin ; Status:   Prescribed ; Ordered As Mnemonic:   Crestor 10 mg oral tablet ; Simple Display Line:   10 mg, 1 tab(s), po, hs, 90 tab(s), 3 Refill(s) ; Ordering Provider:   Tommy Damon MD; Catalog Code:    rosuvastatin ; Order Dt/Tm:   8/26/2020 9:21:08 AM CDT            Home Meds    capromab pendetide  :   capromab pendetide ; Status:   Documented ; Ordered As Mnemonic:   ProstaScint 0.5 mg/mL intravenous solution ; Simple Display Line:   0 Refill(s) ; Catalog Code:   capromab pendetide ; Order Dt/Tm:   4/28/2020 1:36:07 PM CDT          lisinopril  :   lisinopril ; Status:   Documented ; Ordered As Mnemonic:   lisinopril 10 mg oral tablet ; Simple Display Line:   10 mg, 1 tab(s), Oral, daily, 0 Refill(s) ; Catalog Code:   lisinopril ; Order Dt/Tm:   4/28/2020 1:31:41 PM CDT            ID Risk Screen   Recent Travel History :   No recent travel   Family Member Travel History :   No recent travel   Other Exposure to Infectious Disease :   Community exposure to COVID-19 within the last 14 days   Mindy Baldwin CMA - 10/28/2020 11:56 AM CDT

## 2022-02-16 NOTE — PROGRESS NOTES
Patient:   LIDDLE, CHAD A            MRN: [id number]            FIN: [id number]               Age:   44 years     Sex:  Male     :  1975   Associated Diagnoses:   Tobacco Abuse-Unspec; Uncontrolled type I diabetes mellitus with proliferative retinopathy; Erectile dysfunction; Obstructive sleep apnea   Author:   Tommy Damon MD      Visit Information      Date of Service: 2020 02:53 pm  Performing Location: [facility]  Encounter#: [id number]      Primary Care Provider (PCP):  Tommy Dmaon MD    NPI# [id number]      Referring Provider:  Tommy Damon MD    NPI# [id number]      Chief Complaint   2020 3:09 PM CST     stomach issues - ongoing  - RUQ pains, h/a's, bloating              Additional Information:No additional information recorded during visit.   Chief complaint and symptoms as noted above and confirmed with patient.  Recent lab and diagnostic studies reviewed with patient      History of Present Illness   2014: Presents to clinic to establish care, previously seen by [facility]; though more recently transferred his care at [facility], now wishing to return care more locally.  He has a long-time history of type 1 diabetes, generally with a fairly noncompliant course.  He uses Lantus 28 units daily at bedtime and NovoLog 10-14 units per meal, though never checking pre-meal blood sugar before insulin bolus.  He s had issues with admissions for DKA in the past.  He is interested in potentially pursuing insulin pump.  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  He continues to smoke on a daily basis.  He s been experiencing upper left-sided chest and shoulder discomfort that is not related to exertion.  He s also been experiencing abdominal pains for the last several days, typical of what is experienced with diabetic ketoacidosis in the past.    2017:  Returns to clinic feeling well.  No recent diabetic changes; using spreadsheet provided; denies any hypoglycemia.  Still issues with decipline checking BS and taking insulin.  Knows he can do better.  Does express genuine interest in insulin pump use.  Erectile function significantly worse; now no real erection despite cialis 20mg use.    3/14/2019: Taye returns to clinic for follow-up.  Primary concern revolves around his emotional well-being and his marriage.  He raises concerns about significant mental health issues in the family bring particular his wife and questions whether he may be depressed himself.  He shares that he and his wife have seen a couple s counselor in the past which she said was a very good overall experience.  She had offered to see his wife individually for suspected depression which is wife never followed through with.  He describes a significant strain in the relationship especially related to sexual health.  He feels that his underlying erectile dysfunction contributes significantly to the issue.  He describes getting excited when able to participate in outdoor hobbies.  In particular describes snowmobiling with his family which gave him a good emotional high.    10/3/2019: Taye returns to clinic related to his type 1 diabetes.  Overall doing well.  Last seen by Sarah in July and some changes made to pump settings.  Describes more unpredictable work schedule which has had some more lability on his blood sugars.  Has been working with urology and finding self penile injections to be quite helpful with his erectile dysfunction.  Brings up concerns related to daytime somnolence and heavy observed snoring by his wife.  Strong history of sleep apnea in the family.    1/8/2020:  Returns with ~6 week history of persistent RLQ abdominal pains, general bloating.  He was seen in clinic on 11/26 for same reasons, CT A/P obtained at that time without any acute abnormalities.  He's tried regular fiber  and eliminated gluten from diet with some symptom improvement. He's still bothered by residual pains.          Review of Systems   Constitutional:  No fever, No chills.    Eye:  Negative except as documented in history of present illness.    Ear/Nose/Mouth/Throat:  Negative except as documented in history of present illness.    Respiratory:  No shortness of breath.    Cardiovascular:  No palpitations, No peripheral edema, No syncope.    Gastrointestinal:  No nausea, No vomiting     Abdominal pain: Right, Lower quadrant.    Genitourinary:  ED, No dysuria, No hematuria.    Hematology/Lymphatics:  Negative except as documented in history of present illness.    Endocrine:  No excessive thirst, No polyuria.    Immunologic:  No recurrent fevers.    Musculoskeletal:  No joint pain, No muscle pain.    Neurologic:  Alert and oriented X4, Numbness, No tingling, No headache.    Psychiatric:  Depression.       Health Status   Allergies:    Allergic Reactions (Selected)  Severity Not Documented  Ampicillin (Rash)  Simvastatin (Myalgias)   Medications:  (Selected)   Prescriptions  Prescribed  1ST TIER UNIFINE PE 32G X 4MM MISC 32 G X 4MM: 1ST TIER UNIFINE PE 32G X 4MM MISC 32 G X 4MM, See Instructions, Instructions: Use to inject as needed., Supply, # 100 EA, 11 Refill(s), Type: Maintenance, Pharmacy: [facility], keep on file and pt will notify when needed, Use to...  Auto Titrating CPAP 6-16 for daily use: Auto Titrating CPAP 6-16 for daily use, See Instructions, Instructions: Heated humidifier x1; Humidifier chamber x1;  Heated tubing x1; Full face mask of choice with headgear  x1; Cushion x 1;  Filters: Disposable x1pk & Reusable x1pk.  Length of Need...  Cris Contour Next test strips: Cris Contour Next test strips, See Instructions, Instructions: testing 4-8 x/ day, Supply, # 800 EA, 3 Refill(s), Type: Maintenance, Pharmacy: [facility], keep on hold and pt will notify when needed,  testing 4-8 x/ day  Crestor 10 mg oral tablet: = 1 tab(s) ( 10 mg ), po, hs, # 90 tab(s), 3 Refill(s), Type: Maintenance, Pharmacy: [facility], keep on file and pt will notify when needed, 1 tab(s) Oral hs  NovoLOG 100 units/mL injectable solution: See Instructions, Instructions: 75 units via pump daily, # 70 mL, 3 Refill(s), Type: Maintenance, Pharmacy: [facility], 75 units via pump daily  aspirin 81 mg oral tablet: 1 tab(s) ( 81 mg ), PO, Daily, # 90 tab(s), 3 Refill(s), Type: Maintenance, [facility], 1 tab(s) po daily  one touch delica lancets: one touch delica lancets, See Instructions, Instructions: change needle daily, Supply, # 3 box(es), 3 Refill(s), Type: Maintenance, Pharmacy: [facility], keep on hold and pt will notify when needed, change needle daily  Documented Medications  Documented  Metamucil: Oral, 0 Refill(s), Type: Maintenance  Viagra 100 mg oral tablet: See Instructions, Instructions: 1 tab(s)   1 hour before sexual activity, # 6 tab(s), 0 Refill(s), Type: Maintenance, Written Rx per Dr Prtaer,    Medications          *denotes recorded medication          Cris Contour Next test strips: See Instructions, testing 4-8 x/ day, 800 EA, 3 Refill(s).          1ST TIER UNIFINE PE 32G X 4MM MISC 32 G X 4MM: See Instructions, Use to inject as needed., 100 EA, 11 Refill(s).          one touch delica lancets: See Instructions, change needle daily, 3 box(es), 3 Refill(s).          Auto Titrating CPAP 6-16 for daily use: See Instructions, Heated humidifier x1; Humidifier chamber x1;  Heated tubing x1; Full face mask of choice with headgear  x1; Cushion x 1;  Filters: Disposable x1pk & Reusable x1pk.  Length of Need = 99 Months, 1 EA, 11 Refill(s).          aspirin 81 mg oral tablet: 81 mg, 1 tab(s), PO, Daily, 90 tab(s).          NovoLOG 100 units/mL injectable solution: See Instructions, 75 units via pump daily, 70 mL, 3  Refill(s).          *Metamucil: Oral, 0 Refill(s).          Crestor 10 mg oral tablet: 10 mg, 1 tab(s), po, hs, 90 tab(s), 3 Refill(s).          *Viagra 100 mg oral tablet: See Instructions, 1 tab(s)   1 hour before sexual activity, 6 tab(s), 0 Refill(s).       Problem list:    All Problems  Diabetes Type I / ICD-9-.01 / Confirmed  DM neuropathy, type I diabetes mellitus / SNOMED CT 272937954 / Confirmed  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 / Confirmed  Dyslipidemia / ICD-9-.4 / Confirmed  Erectile Dysfunction / ICD-9-.84 / Confirmed  Obstructive sleep apnea syndrome, severe / SNOMED CT 858994857 / Confirmed  Tobacco Abuse-Unspec / ICD-9-.1 / Confirmed  Inactive: GERD (Gastroesophageal Reflux Disease) / ICD-9-.81  Inactive: history of diabeteic ketoacidosis  Resolved: Hospitalized for Diabetes being out of control  Resolved: Retinopathy due to Secondary Diabetes / ICD-9-.50  Canceled: Dyslipidemia / ICD-9-.4  Canceled: Diabetes mellitus type I / SNOMED CT 437151052      Histories   Past Medical History:    Active  Diabetes Type I (250.01): Onset in the month of 9/1985 at 9 years  Tobacco Abuse-Unspec (305.1)  Resolved  Hospitalized for Diabetes being out of control: Onset in 1991 at 15 years.  Resolved.  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  Comments:  1/13/2014 CST 3:55 PM CST - Nelly Acuña MD  Status post laser therapy (Dr. Sherif Charles)   Family History:    Sleep apnea syndrome  Father  Sister (Tere)  Sister (Melissa)  Psoriasis  Father  Heart attack  Grandfather (P)  Hypercholesterolemia  Father  Seizure  Sister (Melissa)  Sister (Tere)  Angioplasty  Father  Uncle (P)  MI - Myocardial infarction  Uncle (P)  CAD - Coronary artery disease  Grandfather (P)  Father  Uncle (P)     Procedure history:    Colonoscopy (423279442) in the month of 1/2015 at 39 Years.  Comments:  7/1/2015 10:07 AM CDT - Monica WING, Ebonie  Colon, descending, polyp:  1.  Tubular  adenoma  2.  No evidence of high grade dsyplasia  3.  Polyp size:5mm(resection/retrievel - complete)  - Repeat in 5yrs   Social History:        Alcohol Assessment            Current, Beer (12 oz), 1-2 times per week, 2 drinks/episode average.  3 drinks/episode maximum.      Tobacco Assessment: Current            Current, Cigarettes, 10 per day.      Employment and Education Assessment            Employed, Work/School description: [employer].        Physical Examination   vital signs stable, as noted above   Vital Signs   1/8/2020 3:09 PM CST Temperature Tympanic 97.6 DegF  LOW    Peripheral Pulse Rate 60 bpm    Systolic Blood Pressure 126 mmHg    Diastolic Blood Pressure 76 mmHg    Mean Arterial Pressure 93 mmHg      Measurements from flowsheet : Measurements   1/8/2020 3:09 PM CST Height Measured - Standard 65 in    Weight Measured - Standard 187.8 lb    BSA 1.97 m2    Body Mass Index 31.25 kg/m2  HI      General:  Alert and oriented, No acute distress.    Eye:  Extraocular movements are intact.    HENT:  Tympanic membranes are clear, No pharyngeal erythema.    Respiratory:  Lungs are clear to auscultation, Respirations are non-labored.    Cardiovascular:  Normal rate, Regular rhythm, No murmur, No edema.    Gastrointestinal:  Soft, Normal bowel sounds, wearing insulin pump, mild general distention; focal RLQ tenderness to palpation; no rebound, no radiating features.    Neurologic:  Alert, Oriented, Normal motor function, No focal deficits.    Cognition and Speech:  Oriented, Speech clear and coherent.    Psychiatric:  Appropriate mood & affect.       Review / Management   Results review:  Lab results   11/26/2019 9:50 AM CST UA Epithelial Cells Few    UA Mucous Present    UA WBC 0-2    UA RBC 3-5    UA Bacteria Few   11/26/2019 9:43 AM CST Sodium Level 141 mmol/L    Potassium Level 4.0 mmol/L    Chloride Level 107 mmol/L    CO2 Level 25 mmol/L    AGAP 9    Glucose Level 135 mg/dL    BUN 12 mg/dL    Creatinine  0.99 mg/dL    BUN/Creat Ratio 12    eGFR  >60    eGFR Non-African American >60    Calcium Level 9.7 mg/dL    Bili Total 0.3 mg/dL    Bili Direct 0.1 mg/dL    Bili Indirect 0.2 mg/dL    Alk Phos 105 IU/L    AST/SGOT 29 IU/L    ALT/SGPT 39 IU/L    Protein Total 7.1 g/dL    Albumin Level 4.4 g/dL    Globulin 2.7 g/dL    A/G Ratio 1.6    WBC 7.2    RBC 4.56    Hgb 13.8 g/dL    Hct 39.7 %    MCV 87 fL    MCH 30.3 pg    MCHC 34.8 g/dL    RDW 13.6 %    Platelet 288    MPV 9.1 fL   11/26/2019 9:40 AM CST UA pH 6.5    UA Specific Gravity 1.020    UA Glucose NEGATIVE    UA Bilirubin NEGATIVE    UA Ketones NEGATIVE    U Occult Blood NEGATIVE    UA Protein 1+    UA Nitrite NEGATIVE    UA Leuk Est NEGATIVE   10/3/2019 3:46 PM CDT Sodium Level 138 mmol/L    Potassium Level 3.9 mmol/L    Chloride Level 103 mmol/L    CO2 Level 26 mmol/L    Glucose Level 44 mg/dL  LOW    BUN 18 mg/dL    Creatinine 1.06 mg/dL    BUN/Creat Ratio NOT APPLICABLE    eGFR 84 mL/min/1.73m2    eGFR African American 98 mL/min/1.73m2    Calcium Level 10.1 mg/dL    U Microalbumin 19.0 mg/dL    Ur Creatinine 117 mg/dL    Ur Microalb/Creat Ratio 162  HI   .       Impression and Plan   Diagnosis     Tobacco Abuse-Unspec (EHE16-SM Z72.0).     Uncontrolled type I diabetes mellitus with proliferative retinopathy (FBP35-HP E10.3599).     Erectile dysfunction (SXE94-UH N52.9).     Obstructive sleep apnea (ZMX87-QY G47.33).         .) type I Dm, controlled  hypoglycemic medications: none  insulin therapy: Medtronic insulin pump/CGMS   - ICR 1:6.5   - basal rate: 0.975-1.05   - sensitivity 30mg/dL   - working with Sarah  last HbA1c: 7.8%   microvascular complications: proliferative retinopathy, + DOROTHY, neuropathy   - doesn't think neuropathic pain needing medication at this time  macrovascular complications: none known  ASA/AIDEN/statin:  ASA 81mg daily, lisinopril 10mg daily, rosuvastatin 10mg daily   - smoking cessation counseling    .) sub-acute abdominal  pains  - CT A/P (11/26/2019) - no described acute pathology  - gluten free trial with some symptoms improvement (no h/o diarrhea)  - colonoscopy in '15 (tubular adenoma) - will have him f/u with GI for both due CRC screening and assessment of new abdominal pains  - in the interim, advised to take a break from fiber therapy - may be aggravating symptoms    plan as previously outlined:     .) erectile dysfunction - almost certainly related to diabetic neuropathy/vascular disease   - poor response to Viagra and Cialis   - working with Urology; seeing a lot of success with penile injections    .) DAYANA, suspected  - high suspicions  - high [place] sleepiness index (daytime fatigue, snoring, insomnia, etc)    RTC in 4 months         Professional Services   Counseling Summary:  This was a 25 minute visit with greater than 50% of that time spent counseling the patient.

## 2022-02-16 NOTE — LETTER
(Inserted Image. Unable to display)     January 06, 2020      CHAD LIDDLE  [address] [place], [place] [id number]          Dear QUITA,      Thank you for selecting [facility] (previously [facility] [place] & [facility]) for your healthcare needs.      Our records indicate you are due for the following services:     Sleep Apnea Follow up ~ It is recommended and possibly required by your insurance to see one of our sleep physicians, Dr. Lonny Melgar or Dr. Tim Pugh, 30-90 days after starting treatment (PAP therapy) for sleep apnea.  To determine whether you are benefiting from PAP therapy, a download of your machine will be needed. We may be able to obtain the download remotely; however, to ensure this information will be available for your visit, please bring your CPAP machine SD card to your visit.    Appointments with our sleep physicians can be made by calling your primary clinic.        To schedule an appointment or if you have further questions, please contact your primary clinic:   [facility]       [phone]   [facility]       [phone]              [facility]     [phone]      Powered by [employer]    Sincerely,    Alexander Melgar MD, FACP

## 2022-02-16 NOTE — TELEPHONE ENCOUNTER
Patient informed of appt scheduled at [facility] today at 12:00.  Order is faxed to [phone] and requesting a STAT read and results called to [facility].  ([facility] CT machine is down and CT is ordered at STAT)

## 2022-02-16 NOTE — NURSING NOTE
Diabetes Eye Testing Entered On:  1/8/2020 7:50 AM CST    Performed On:  3/14/2019 7:50 AM CDT by Ebonie Anderson CMA               Diabetes Eye Testing   Retinopathy Present TR :   Yes   Dilated Retinal Exam Date TR :   3/14/2019 CDT   Severity of Retinopathy TR :   quiescent prolif Retinopathy   Ebonie Anderson CMA - 1/8/2020 7:50 AM CST

## 2022-02-16 NOTE — NURSING NOTE
Quick Intake Entered On:  6/13/2019 2:07 PM CDT    Performed On:  6/10/2019 2:07 PM CDT by Sindhu Gannon               Summary   Weight Measured :   177.2 lb(Converted to: 177 lb 3 oz, 80.38 kg)    Height Measured :   65 in(Converted to: 5 ft 5 in, 165.10 cm)    Body Mass Index :   29.48 kg/m2 (HI)    Body Surface Area :   1.92 m2   Sindhu Gannon - 6/13/2019 2:07 PM CDT

## 2022-02-16 NOTE — NURSING NOTE
Comprehensive Intake Entered On:  11/8/2019 1:24 PM CST    Performed On:  11/8/2019 1:21 PM CST by Katt Melendez               Summary   Chief Complaint :   Patient is here today to f/u HST   Weight Measured :   183.08 lb(Converted to: 183 lb 1 oz, 83.04 kg)    Height Measured :   65 in(Converted to: 5 ft 5 in, 165.10 cm)    Body Mass Index :   30.46 kg/m2 (HI)    Body Surface Area :   1.95 m2   Systolic Blood Pressure :   148 mmHg (HI)    Diastolic Blood Pressure :   78 mmHg   Mean Arterial Pressure :   101 mmHg   Peripheral Pulse Rate :   89 bpm   BP Site :   Right arm   BP Method :   Manual   HR Method :   Electronic   Temperature Tympanic :   97.1 DegF(Converted to: 36.2 DegC)  (LOW)    Oxygen Saturation :   92 % (LOW)    Katt Melendez - 11/8/2019 1:21 PM CST   Health Status   Allergies Verified? :   Yes   Medication History Verified? :   Yes   Immunizations Current :   Yes   Medical History Verified? :   Yes   Pre-Visit Planning Status :   Completed   Tobacco Use? :   Current every day smoker   Katt Melendez - 11/8/2019 1:21 PM CST   Consents   Consent for Immunization Exchange :   Consent Granted   Consent for Immunizations to Providers :   Consent Granted   Katt Melnedez - 11/8/2019 1:21 PM CST   Meds / Allergies   (As Of: 11/8/2019 1:24:58 PM CST)   Allergies (Active)   ampicillin  Estimated Onset Date:   Unspecified ; Reactions:   Rash ; Created By:   Malika Wilson CMA; Reaction Status:   Active ; Category:   Drug ; Substance:   ampicillin ; Type:   Allergy ; Updated By:   Malika Wilson CMA; Reviewed Date:   11/8/2019 1:23 PM CST      simvastatin  Estimated Onset Date:   Unspecified ; Reactions:   myalgias ; Created By:   Srinivas Brwon MD; Reaction Status:   Active ; Category:   Drug ; Substance:   simvastatin ; Type:   Allergy ; Updated By:   Srinivas Brown MD; Reviewed Date:   11/8/2019 1:23 PM CST        Medication List   (As Of: 11/8/2019 1:24:58 PM CST)    Prescription/Discharge Order    rosuvastatin  :   rosuvastatin ; Status:   Prescribed ; Ordered As Mnemonic:   Crestor 10 mg oral tablet ; Simple Display Line:   10 mg, 1 tab(s), po, hs, 90 tab(s), 3 Refill(s) ; Ordering Provider:   Tommy Damon MD; Catalog Code:   rosuvastatin ; Order Dt/Tm:   10/3/2019 3:44:57 PM CDT          lisinopril  :   lisinopril ; Status:   Prescribed ; Ordered As Mnemonic:   lisinopril 10 mg oral tablet ; Simple Display Line:   10 mg, 1 tab(s), PO, Daily, 90 tab(s), 3 Refill(s) ; Ordering Provider:   Tommy Damon MD; Catalog Code:   lisinopril ; Order Dt/Tm:   10/3/2019 3:44:56 PM CDT          Miscellaneous Rx Supply  :   Miscellaneous Rx Supply ; Status:   Prescribed ; Ordered As Mnemonic:   one touch delica lancets ; Simple Display Line:   See Instructions, change needle daily, 3 box(es), 3 Refill(s) ; Ordering Provider:   Tommy Damon MD; Catalog Code:   Miscellaneous Rx Supply ; Order Dt/Tm:   10/3/2019 3:44:56 PM CDT          Miscellaneous Rx Supply  :   Miscellaneous Rx Supply ; Status:   Prescribed ; Ordered As Mnemonic:   1ST TIER UNIFINE PE 32G X 4MM MISC 32 G X 4MM ; Simple Display Line:   See Instructions, Use to inject as needed., 100 EA, 11 Refill(s) ; Ordering Provider:   Tommy Damon MD; Catalog Code:   Miscellaneous Rx Supply ; Order Dt/Tm:   10/3/2019 3:44:55 PM CDT          Miscellaneous Prescription  :   Miscellaneous Prescription ; Status:   Prescribed ; Ordered As Mnemonic:   Cris Contour Next test strips ; Simple Display Line:   See Instructions, testing 4-8 x/ day, 800 EA, 3 Refill(s) ; Ordering Provider:   Tommy Damon MD; Catalog Code:   Miscellaneous Prescription ; Order Dt/Tm:   10/3/2019 3:44:54 PM CDT          insulin aspart  :   insulin aspart ; Status:   Prescribed ; Ordered As Mnemonic:   NovoLOG 100 units/mL injectable solution ; Simple Display Line:   See Instructions, 75 units via pump daily, 70 mL, 3 Refill(s) ; Ordering Provider:   Tommy Damon MD; Catalog  Code:   insulin aspart ; Order Dt/Tm:   10/3/2019 3:44:53 PM CDT          aspirin  :   aspirin ; Status:   Prescribed ; Ordered As Mnemonic:   aspirin 81 mg oral tablet ; Simple Display Line:   81 mg, 1 tab(s), PO, Daily, 90 tab(s) ; Ordering Provider:   Nelly Acuña MD; Catalog Code:   aspirin ; Order Dt/Tm:   1/10/2014 2:24:22 PM [facility]            Home Meds    sildenafil  :   sildenafil ; Status:   Documented ; Ordered As Mnemonic:   Viagra 100 mg oral tablet ; Simple Display Line:   See Instructions, 1 tab(s)   1 hour before sexual activity, 6 tab(s), 0 Refill(s) ; Catalog Code:   sildenafil ; Order Dt/Tm:   9/25/2017 12:11:22 PM CDT

## 2022-02-16 NOTE — TELEPHONE ENCOUNTER
---------------------  From: Alexander Melgar MD   To: Sleep Message Pool ([id number] - [place]);     Sent: 11/8/2019 1:35:43 PM CST  Subject: General Message     AutoPAP with compliance report in one month.Spoke with patient he is wanting to go through [facility]. The following information was faxed to Nikol[facility]:    Provider order from Dr. Melgar dated 11.18.19, sleep study dated 10.30.19, provider notes from Dr. Melgar dated 11.08.19, Dr. DASHA Damon dated 10.03.19, demographic and insurance information.

## 2022-02-16 NOTE — TELEPHONE ENCOUNTER
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 He also reports increased fatigue and cramping muscles.  He says he has been drinking plenty of water, but has not eaten or taken in many calories. I advised him to schedule an appointment.  He agrees and was transferred to scheduling.noted

## 2022-02-16 NOTE — NURSING NOTE
Depression Screening Entered On:  3/15/2019 11:44 AM CDT    Performed On:  3/14/2019 11:44 AM CDT by German Sutton CMA               Depression Screening   Feeling Down, Depressed, Hopeless :   Several days   Little Interest - Pleasure in Activities :   More than half the days   Initial Depression Screen Score :   3    Trouble Falling or Staying Asleep :   Several days   Feeling Tired or Little Energy :   More than half the days   Poor Appetite or Overeating :   Several days   Feeling Bad About Yourself :   More than half the days   Trouble Concentrating :   More than half the days   Moving or Speaking Slowly :   Several days   VERNON Difficulty with Work, Home, Others :   Not difficult at all   German Sutton CMA - 3/15/2019 11:44 AM CDT

## 2022-02-16 NOTE — NURSING NOTE
CAGE Assessment Entered On:  3/15/2019 11:44 AM CDT    Performed On:  3/14/2019 11:44 AM CDT by German Sutton CMA               Assessment   Have you ever felt you should cut down on your drinking :   No   Have people annoyed you by criticizing your drinking :   No   Have you ever felt bad or guilty about your drinking :   No   Have you ever taken a drink first thing in the morning to steady your nerves or get rid of a hangover (Eye-opener) :   No   CAGE Score :   0    German Sutton CMA - 3/15/2019 11:44 AM CDT

## 2022-02-16 NOTE — LETTER
(Inserted Image. Unable to display)   February 24, 2021        CHAD LIDDLE  [address] [place], [place] [id number]        Dear QUITA,      Thank you for selecting [facility] for your healthcare needs.    Our records indicate you are due for the following services:    Diabetic Exam ~ Please bring your glucose meter and/or your blood glucose diary to your appointment.  Urine labs ~ Please be prepared to leave a urine specimen for evaluation  Non-Fasting Labs    If you had your labs done at another facility or with Direct Access Lab Testing at [facility], please bring in a copy of the results to your next visit, mail a copy, or drop off a copy of your results to your Healthcare Provider.    You are due for lab work and an office visit; please schedule the lab appointment 1 week before the office visit.  This will assure all results are available to discuss with your Healthcare Provider during your visit.    (FYI   Regarding office visits: In some instances, a video visit or telephone visit may be offered as an option.)      **It is very helpful if you bring your medication bottles to your appointment.  This assures we have all of your current medications, including strength and dosing information, documented accurately in your medical record.    To schedule an appointment or if you have further questions, please contact your clinic at [phone].         Powered by [employer]    Sincerely,    Tommy Damon M.D.

## 2022-02-16 NOTE — PROGRESS NOTES
Patient:   LIDDLE, CHAD A            MRN: [id number]            FIN: [id number]               Age:   43 years     Sex:  Male     :  1975   Associated Diagnoses:   Tobacco Abuse-Unspec; Uncontrolled type I diabetes mellitus with proliferative retinopathy; Erectile dysfunction   Author:   Tommy Damon MD      Visit Information      Date of Service: 2019 04:06 pm  Performing Location: [facility]  Encounter#: [id number]      Primary Care Provider (PCP):  Tommy Damon MD    NPI# [id number]      Referring Provider:  Vernell Damon MD    NPI# [id number]      Chief Complaint   3/14/2019 4:35 PM CDT    DM check. Eye exam done today.              Additional Information:No additional information recorded during visit.   Chief complaint and symptoms as noted above and confirmed with patient.  Recent lab and diagnostic studies reviewed with patient      History of Present Illness   2014: Presents to clinic to establish care, previously seen by [facility]; though more recently transferred his care at [facility], now wishing to return care more locally.  He has a long-time history of type 1 diabetes, generally with a fairly noncompliant course.  He uses Lantus 28 units daily at bedtime and NovoLog 10-14 units per meal, though never checking pre-meal blood sugar before insulin bolus.  He s had issues with admissions for DKA in the past.  He is interested in potentially pursuing insulin pump.  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  He continues to smoke on a daily basis.  He s been experiencing upper left-sided chest and shoulder discomfort that is not related to exertion.  He s also been experiencing abdominal pains for the last several days, typical of what is experienced with diabetic ketoacidosis in the past.    2017: Returns to clinic feeling well.  No recent  diabetic changes; using spreadsheet provided; denies any hypoglycemia.  Still issues with decipline checking BS and taking insulin.  Knows he can do better.  Does express genuine interest in insulin pump use.  Erectile function significantly worse; now no real erection despite cialis 20mg use.    3/14/2019: Taye returns to clinic for follow-up.  Primary concern revolves around his emotional well-being and his marriage.  He raises concerns about significant mental health issues in the family bring particular his wife and questions whether he may be depressed himself.  He shares that he and his wife have seen a couple s counselor in the past which she said was a very good overall experience.  She had offered to see his wife individually for suspected depression which is wife never followed through with.  He describes a significant strain in the relationship especially related to sexual health.  He feels that his underlying erectile dysfunction contributes significantly to the issue.  He describes getting excited when able to participate in outdoor hobbies.  In particular describes snowmobiling with his family which gave him a good emotional high.         Review of Systems   Constitutional:  No fever, No chills.    Eye:  Negative except as documented in history of present illness.    Ear/Nose/Mouth/Throat:  Negative except as documented in history of present illness.    Respiratory:  No shortness of breath.    Cardiovascular:  No palpitations, No peripheral edema, No syncope.    Gastrointestinal:  No nausea, No vomiting.    Genitourinary:  ED, No dysuria, No hematuria.    Hematology/Lymphatics:  Negative except as documented in history of present illness.    Endocrine:  No excessive thirst, No polyuria.    Immunologic:  No recurrent fevers.    Musculoskeletal:  No joint pain, No muscle pain.    Neurologic:  Alert and oriented X4, Numbness, No tingling, No headache.    Psychiatric:  Depression.       Health Status    Allergies:    Allergic Reactions (Selected)  Severity Not Documented  Ampicillin (Rash)  Simvastatin (Myalgias)   Medications:  (Selected)   Prescriptions  Prescribed  1ST TIER UNIFINE PE 32G X 4MM MISC 32 G X 4MM: 1ST TIER UNIFINE PE 32G X 4MM MISC 32 G X 4MM, See Instructions, Instructions: Use to inject as needed., Supply, # 100 EA, 11 Refill(s), Type: Maintenance, Pharmacy: [facility], keep on file and pt will notify when needed, Use to...  Cris Contour Next test strips: Cris Contour Next test strips, See Instructions, Instructions: testing 4-8 x/ day, Supply, # 800 EA, 3 Refill(s), Type: Maintenance, Pharmacy: [facility], testing 4-8 x/ day  Crestor 10 mg oral tablet: = 1 tab(s) ( 10 mg ), po, hs, # 90 tab(s), 3 Refill(s), Type: Maintenance, Pharmacy: [facility], keep on file and pt will notify when needed, 1 tab(s) Oral hs  NovoLOG 100 units/mL injectable solution: See Instructions, Instructions: 75 units via pump daily, # 70 mL, 3 Refill(s), Type: Maintenance, Pharmacy: [facility], 75 units via pump daily  aspirin 81 mg oral tablet: 1 tab(s) ( 81 mg ), PO, Daily, # 90 tab(s), 3 Refill(s), Type: Maintenance, Pharmacy: [facility], 1 tab(s) po daily  lisinopril 10 mg oral tablet: = 1 tab(s) ( 10 mg ), PO, Daily, # 90 tab(s), 3 Refill(s), Type: Maintenance, Pharmacy: [facility], 1 tab(s) Oral daily  one touch delica lancets: one touch delica lancets, See Instructions, Instructions: change needle daily, Supply, # 3 box(es), 3 Refill(s), Type: Maintenance, Pharmacy: [facility], change needle daily  Documented Medications  Documented  Viagra 100 mg oral tablet: See Instructions, Instructions: 1 tab(s)   1 hour before sexual activity, # 6 tab(s), 0 Refill(s), Type: Maintenance, Written Rx per Dr Prater  ibuprofen: 0 Refill(s), Type: Maintenance,    Medications          *denotes  recorded medication          Cris Contour Next test strips: See Instructions, testing 4-8 x/ day, 800 EA, 3 Refill(s).          one touch delica lancets: See Instructions, change needle daily, 3 box(es), 3 Refill(s).          1ST TIER UNIFINE PE 32G X 4MM MISC 32 G X 4MM: See Instructions, Use to inject as needed., 100 EA, 11 Refill(s).          aspirin 81 mg oral tablet: 81 mg, 1 tab(s), PO, Daily, 90 tab(s).          *ibuprofen: 0 Refill(s).          NovoLOG 100 units/mL injectable solution: See Instructions, 75 units via pump daily, 70 mL, 3 Refill(s).          lisinopril 10 mg oral tablet: 10 mg, 1 tab(s), PO, Daily, 90 tab(s), 3 Refill(s).          Crestor 10 mg oral tablet: 10 mg, 1 tab(s), po, hs, 90 tab(s), 3 Refill(s).          *Viagra 100 mg oral tablet: See Instructions, 1 tab(s)   1 hour before sexual activity, 6 tab(s), 0 Refill(s).     Problem list:    All Problems  Diabetes Type I / ICD-9-.01 / Confirmed  DM neuropathy, type I diabetes mellitus / SNOMED CT 752733140 / Confirmed  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 / Confirmed  Dyslipidemia / ICD-9-.4 / Confirmed  Erectile Dysfunction / ICD-9-.84 / Confirmed  Tobacco Abuse-Unspec / ICD-9-.1 / Confirmed  Inactive: GERD (Gastroesophageal Reflux Disease) / ICD-9-.81  Inactive: history of diabeteic ketoacidosis  Resolved: Hospitalized for Diabetes being out of control  Resolved: Retinopathy due to Secondary Diabetes / ICD-9-.50  Canceled: Dyslipidemia / ICD-9-.4  Canceled: Diabetes mellitus type I / SNOMED CT 081364852      Histories   Past Medical History:    Active  Diabetes Type I (250.01): Onset in the month of 9/1985 at 9 years  Tobacco Abuse-Unspec (305.1)  Resolved  Hospitalized for Diabetes being out of control: Onset in 1991 at 15 years.  Resolved.  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  Comments:  1/13/2014 CST 3:55 PM CST - Nelly Acuña MD  Status post laser therapy (Dr. Gorman  Melvin)   Family History:    Psoriasis  Father  Heart attack  Grandfather (P)  Hypercholesterolemia  Father  Seizure  Sister (Melissa)  Sister (Tere)  Angioplasty  Father  Uncle (P)  MI - Myocardial infarction  Uncle (P)  CAD - Coronary artery disease  Grandfather (P)  Father  Uncle (P)     Procedure history:    Colonoscopy ([id number]) in the month of 1/2015 at 39 Years.  Comments:  7/1/2015 10:07 AM CDT - Branstad CMA, [place]  Colon, descending, polyp:  1.  Tubular adenoma  2.  No evidence of high grade dsyplasia  3.  Polyp size:5mm(resection/retrievel - complete)  - Repeat in 5yrs   Social History:        Alcohol Assessment            Current, Beer (12 oz), 1-2 times per week, 2 drinks/episode average.  3 drinks/episode maximum.      Tobacco Assessment: Current            Current, Cigarettes, 10 per day.      Employment and Education Assessment            Employed, Work/School description: [employer].      Physical Examination   vital signs stable, as noted above   Vital Signs   3/14/2019 4:35 PM CDT Temperature Tympanic 98.1 DegF    Peripheral Pulse Rate 88 bpm    Pulse Site Radial artery    HR Method Manual    Systolic Blood Pressure 152 mmHg  HI    Diastolic Blood Pressure 84 mmHg  HI    Mean Arterial Pressure 107 mmHg    BP Site Right arm    BP Method Manual      Measurements from flowsheet : Measurements   3/14/2019 4:35 PM CDT    Weight Measured - Standard                183 lb     General:  Alert and oriented, No acute distress.    Eye:  Extraocular movements are intact.    HENT:  Tympanic membranes are clear, No pharyngeal erythema.    Respiratory:  Lungs are clear to auscultation, Respirations are non-labored.    Cardiovascular:  Regular rhythm, No edema.    Gastrointestinal:  Soft, Non-tender, Non-distended, Normal bowel sounds, wearing insulin pump.    Neurologic:  Alert, Oriented, Normal motor function, No focal deficits, monofilament testing is normal.    Cognition and Speech:  Oriented, Speech clear and  coherent.    Psychiatric:  Appropriate mood & affect.       Review / Management   Results review      Impression and Plan   Diagnosis     Tobacco Abuse-Unspec (AGW35-MO Z72.0).     Uncontrolled type I diabetes mellitus with proliferative retinopathy (LAN97-AF E10.3599).     Erectile dysfunction (NNC15-KM N52.9).         .) type I Dm, controlled  hypoglycemic medications: none  insulin therapy: Medtronic insulin pump/CGMS   - ICR 1:5 (has been over-riding bolus wizard regularly by reducing insulin dosing by 1-2 units when set at 1:4)   - basal rate: 1.05   - sensitivity 25mg/dL   - working with Sarah to get updated CGMS  last HbA1c: 8%   microvascular complications: proliferative retinopathy, + DOROTHY, neuropathy   - doesn't think neuropathic pain needing medication at this time  macrovascular complications: none known  ASA/AIDEN/statin:  ASA 81mg daily, lisinopril 10mg daily, rosuvastatin 10mg daily   - smoking cessation counseling    .) erectile dysfunction - almost certainly related to diabetic neuropathy/vascular disease   - poor response to Viagra and Cialis   - poor affecting his marriage/sexual health   - he has seen [facility] - generally didn't find that helpful   - will seek out further urology input; I think he'd like to explore further therapy options; potential penile prosthesis    .) depression?  - had been seeing couples counselor in [place] which both he and his spouse found helpful.  He suspects his wife has significant mental health issues  - PHQ9: 12   - we thinks he has situational stressors - still feels motivated after outdoor activities  - he wants to hold off on antidepressants  - encouraged him to get back to couples counseling and also pursue urology referral for ED    RTC in 4 months         Professional Services   Counseling Summary:  This was a 25 minute visit with greater than 50% of that time spent counseling the patient.

## 2022-02-16 NOTE — NURSING NOTE
Comprehensive Intake Entered On:  9/11/2019 5:16 PM CDT    Performed On:  9/11/2019 5:12 PM CDT by Charmaine Tubbs CMA               Summary   Chief Complaint :   Chest congestion, head congestion x 1 week. Fevers in beginning. No recent travel.     Weight Measured :   182 lb(Converted to: 182 lb 0 oz, 82.55 kg)    Systolic Blood Pressure :   154 mmHg (HI)    Diastolic Blood Pressure :   82 mmHg (HI)    Mean Arterial Pressure :   106 mmHg   Peripheral Pulse Rate :   78 bpm   BP Site :   Right arm   Pulse Site :   Radial artery   BP Method :   Manual   HR Method :   Electronic   Temperature Tympanic :   97.6 DegF(Converted to: 36.4 DegC)  (LOW)    Oxygen Saturation :   97 %   Charmaine Tubbs CMA - 9/11/2019 5:12 PM CDT   Health Status   Allergies Verified? :   Yes   Medication History Verified? :   Yes   Immunizations Current :   Yes   Pre-Visit Planning Status :   Not completed   Charmaine Tubbs CMA - 9/11/2019 5:12 PM CDT   Meds / Allergies   (As Of: 9/11/2019 5:16:45 PM CDT)   Allergies (Active)   ampicillin  Estimated Onset Date:   Unspecified ; Reactions:   Rash ; Created By:   Malika Wilson CMA; Reaction Status:   Active ; Category:   Drug ; Substance:   ampicillin ; Type:   Allergy ; Updated By:   Malika Wilson CMA; Reviewed Date:   10/5/2018 10:52 AM CDT      simvastatin  Estimated Onset Date:   Unspecified ; Reactions:   myalgias ; Created By:   Srinivas Brown MD; Reaction Status:   Active ; Category:   Drug ; Substance:   simvastatin ; Type:   Allergy ; Updated By:   Srinivas Brown MD; Reviewed Date:   10/5/2018 10:52 AM CDT        Medication List   (As Of: 9/11/2019 5:16:45 PM CDT)   Prescription/Discharge Order    aspirin  :   aspirin ; Status:   Prescribed ; Ordered As Mnemonic:   aspirin 81 mg oral tablet ; Simple Display Line:   81 mg, 1 tab(s), PO, Daily, 90 tab(s) ; Ordering Provider:   Nelly Acuña MD; Catalog Code:   aspirin ; Order Dt/Tm:   1/10/2014 2:24:22 PM          insulin aspart  :    insulin aspart ; Status:   Prescribed ; Ordered As Mnemonic:   NovoLOG 100 units/mL injectable solution ; Simple Display Line:   See Instructions, 75 units via pump daily, 70 mL, 3 Refill(s) ; Ordering Provider:   Tommy Damon MD; Catalog Code:   insulin aspart ; Order Dt/Tm:   10/5/2018 11:39:07 AM          lisinopril  :   lisinopril ; Status:   Prescribed ; Ordered As Mnemonic:   lisinopril 10 mg oral tablet ; Simple Display Line:   10 mg, 1 tab(s), PO, Daily, 90 tab(s), 3 Refill(s) ; Ordering Provider:   Tommy Damon MD; Catalog Code:   lisinopril ; Order Dt/Tm:   10/5/2018 11:12:30 AM          Miscellaneous Prescription  :   Miscellaneous Prescription ; Status:   Prescribed ; Ordered As Mnemonic:   Cris Contour Next test strips ; Simple Display Line:   See Instructions, testing 4-8 x/ day, 800 EA, 3 Refill(s) ; Ordering Provider:   Tommy Damon MD; Catalog Code:   Miscellaneous Prescription ; Order Dt/Tm:   2/15/2018 7:48:58 PM          Miscellaneous Rx Supply  :   Miscellaneous Rx Supply ; Status:   Prescribed ; Ordered As Mnemonic:   1ST TIER UNIFINE PE 32G X 4MM MISC 32 G X 4MM ; Simple Display Line:   See Instructions, Use to inject as needed., 100 EA, 11 Refill(s) ; Ordering Provider:   Tommy Damon MD; Catalog Code:   Miscellaneous Rx Supply ; Order Dt/Tm:   10/5/2018 11:39:13 AM          Miscellaneous Rx Supply  :   Miscellaneous Rx Supply ; Status:   Prescribed ; Ordered As Mnemonic:   one touch delica lancets ; Simple Display Line:   See Instructions, change needle daily, 3 box(es), 3 Refill(s) ; Ordering Provider:   Tommy Damon MD; Catalog Code:   Miscellaneous Rx Supply ; Order Dt/Tm:   10/5/2018 11:39:09 AM          rosuvastatin  :   rosuvastatin ; Status:   Prescribed ; Ordered As Mnemonic:   Crestor 10 mg oral tablet ; Simple Display Line:   10 mg, 1 tab(s), po, hs, 90 tab(s), 3 Refill(s) ; Ordering Provider:   Tommy Damon MD; Catalog Code:   rosuvastatin ; Order Dt/Tm:   10/5/2018 11:39:11 AM             Home Meds    ibuprofen  :   ibuprofen ; Status:   Documented ; Ordered As Mnemonic:   ibuprofen ; Simple Display Line:   0 Refill(s) ; Catalog Code:   ibuprofen ; Order Dt/Tm:   5/9/2016 5:01:36 PM          sildenafil  :   sildenafil ; Status:   Documented ; Ordered As Mnemonic:   Viagra 100 mg oral tablet ; Simple Display Line:   See Instructions, 1 tab(s)   1 hour before sexual activity, 6 tab(s), 0 Refill(s) ; Catalog Code:   sildenafil ; Order Dt/Tm:   9/25/2017 12:11:22 PM

## 2022-02-16 NOTE — PROGRESS NOTES
Patient:   LIDDLE, CHAD A            MRN: [id number]            FIN: [id number]               Age:   44 years     Sex:  Male     :  1975   Associated Diagnoses:   Acute low back pain without sciatica; Diabetes Type I   Author:   Tommy Damon MD      Visit Information   Visit type:  Telephone Encounter.    Source of history:  Patient.    Location of patient:  Home  Call Start Time:   1335  Call End Time:    _1349      Chief Complaint   2020 1:36 PM CDT    f/u chronic disease - verbal consent given for telemed visit     _      History of Present Illness   Today's visit was conducted via telephone due to the COVID-19 pandemic. Patient's consent to telephone visit was obtained and documented.      Reason for visit:    I spoke with Taye via teleconferencing for acute back pain complaints.  Describes a bandlike pain across his lower back which is comes and goes over the last few days.  He was off work yesterday and today due to the describe symptoms.  No weakness or new numbness.  No fever chills.  No radiation to the pains.  Originally called me thinking pains were kidney related.  No history of any kidney stones.  No described history of any unusual back trauma or injury.         Review of Systems   Constitutional:  No fever, No chills.    Respiratory:  No shortness of breath, No cough.    Gastrointestinal:  No nausea, No vomiting.    Genitourinary:  No dysuria.    Musculoskeletal:       Back pain: The pain is severe, Decreased range of motion, No trauma.       Impression and Plan   Diagnosis     Acute low back pain without sciatica (AVI43-NS M54.5).     Diabetes Type I (ZEY15-MM E10.9).        Health Status   Allergies:    Allergic Reactions (Selected)  Severity Not Documented  Ampicillin (Rash)  Simvastatin (Myalgias)   Medications:  (Selected)   Prescriptions  Prescribed  1ST TIER UNIFINE PE 32G X 4MM MISC 32 G X 4MM: 1ST TIER UNIFINE PE 32G X 4MM MISC 32 G X 4MM, See Instructions, Instructions: Use to inject  as needed., Supply, # 100 EA, 11 Refill(s), Type: Maintenance, Pharmacy: [facility], keep on file and pt will notify when needed, Use to...  Auto Titrating CPAP 6-16 for daily use: Auto Titrating CPAP 6-16 for daily use, See Instructions, Instructions: Heated humidifier x1; Humidifier chamber x1;  Heated tubing x1; Full face mask of choice with headgear  x1; Cushion x 1;  Filters: Disposable x1pk & Reusable x1pk.  Length of Need...  Cris Contour Next test strips: Cris Contour Next test strips, See Instructions, Instructions: testing 4-8 x/ day, Supply, # 800 EA, 3 Refill(s), Type: Maintenance, Pharmacy: [facility], keep on hold and pt will notify when needed, testing 4-8 x/ day  Crestor 10 mg oral tablet: = 1 tab(s) ( 10 mg ), po, hs, # 90 tab(s), 3 Refill(s), Type: Maintenance, Pharmacy: [facility], keep on file and pt will notify when needed, 1 tab(s) Oral hs  NovoLOG 100 units/mL injectable solution: See Instructions, Instructions: 75 units via pump daily, # 70 mL, 3 Refill(s), Type: Maintenance, Pharmacy: [facility], 75 units via pump daily  aspirin 81 mg oral tablet: 1 tab(s) ( 81 mg ), PO, Daily, # 90 tab(s), 3 Refill(s), Type: Maintenance, Pharmacy: [facility], 1 tab(s) po daily  cyclobenzaprine 10 mg oral tablet: = 1 tab(s) ( 10 mg ), Oral, tid, PRN: for spasm, # 30 tab(s), 0 Refill(s), Type: Maintenance, Pharmacy: [facility], hold; pt to request if needed, 1 tab(s) Oral tid,PRN:for spasm  one touch delica lancets: one touch delica lancets, See Instructions, Instructions: change needle daily, Supply, # 3 box(es), 3 Refill(s), Type: Maintenance, Pharmacy: [facility], keep on hold and pt will notify when needed, change needle daily  Documented Medications  Documented  ProstaScint 0.5 mg/mL intravenous solution: 0 Refill(s), Type: Maintenance  lisinopril 10 mg oral tablet: = 1 tab(s) ( 10 mg ),  Oral, daily, 0 Refill(s), Type: Maintenance   Problem list:    All Problems  Diabetes Type I / ICD-9-.01 / Confirmed  DM neuropathy, type I diabetes mellitus / SNOMED CT 331121424 / Confirmed  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 / Confirmed  Dyslipidemia / ICD-9-.4 / Confirmed  Erectile Dysfunction / ICD-9-.84 / Confirmed  Obstructive sleep apnea syndrome, severe / SNOMED CT 009556914 / Confirmed  Tobacco Abuse-Unspec / ICD-9-.1 / Confirmed      Histories   Social History:        Alcohol Assessment            Current, Beer (12 oz), 1-2 times per week, 2 drinks/episode average.  3 drinks/episode maximum.      Tobacco Assessment: Current            Current, Cigarettes, 10 per day.      Employment and Education Assessment            Employed, Work/School description: [employer].        Review / Management     .) acute low back pains - sounding more mechanical in nature.  Doubt kidney stone (bilateral symptoms); doubt pyelo.  No concerning features for radiculopathy; no weakness/numbness  - advised RICE for next 48 hrs; NSAIDs, APAP as needed  - offered Rx for flexeril prn; cautioned about sedation  - advised to rtc if symptoms not improving

## 2022-02-16 NOTE — TELEPHONE ENCOUNTER
---------------------  From: Margarito/Katt NOBLE (Phone Messages Pool ([id number]_[facility]))   To: [facility] Message Pool ([id number]_[place] [facility] [place]);     Sent: 10/25/2021 1:59:39 PM CDT  Subject: medtronic      Call from Mery at [employer] wondering if we received a fax from the in regards to an upgraded insulin pump. I let her know that I do not see if scanned into chart. She will be re faxing it over again.completed - in [facility]'s box for signature

## 2022-02-16 NOTE — PROGRESS NOTES
Chief Complaint    Chest congestion, head congestion x 1 week. Fevers in beginning. No recent travel.  History of Present Illness      Has chest and head congestion 1-2 weeks. Has phlegm and mucous production. Has had a cough as well. Went camping when symptoms started. Missed a couple days of work last week but now back to work. Blood sugars high during the first 6 days have been elevated and now getting better. Mucinex DM helps. Seems to be getting overall a little better.  Review of Systems      No fevers in last week      No vomiting      Felt short of breath last week but no longer      Works [employer]       Quit smoking 12 days ago  Physical Exam   Vitals & Measurements    T: 97.6   F (Tympanic)  HR: 78(Peripheral)  BP: 154/82  SpO2: 97%     WT: 182 lb       General: No acute distress.      HENT: Tympanic membranes are clear, No pharyngeal erythema.      Neck: No lymphadenopathy.      Respiratory: Crackles and wheezing in the right upper lobe      Cardiovascular: Normal rate, Regular rhythm.      Musculoskeletal: Normal gait.      Chest x-ray: No acute findings.  Images reviewed with patient  Assessment/Plan      Reactive airway disease with possible pneumonia: Chest x-ray normal but possible walking pneumonia.  Will treat with Zithromax and prednisone.  If prednisone increases his blood sugars will then cut in half.  Follow-up if not improving.  Patient Information     Name:LIDDLE, CHAD A      Address:      [address] [place] [id number]     Sex:Male     YOB: 1975     [phone]     Emergency Contact:LIDDLE, HEATHER M     MRN:[id number]     FIN:[id number]     [facility]     Date of Service:09/11/2019      Primary Care Physician:       Tommy Damon MD, [phone]      Attending Physician:       Samir Tejeda MD, [phone]  Problem List/Past Medical History    Ongoing     Diabetes Type I     Diabetic Retinopathy        Comments: previous laser treatment     DM neuropathy, type I diabetes mellitus     Dyslipidemia     Erectile Dysfunction     GERD (Gastroesophageal Reflux Disease)     history of diabeteic ketoacidosis     Tobacco Abuse-Unspec    Historical     Hospitalized for Diabetes being out of control     Retinopathy due to Secondary Diabetes       Comments: Status post laser therapy (Dr. Sherif Charles)  Medications    1ST TIER UNIFINE PE 32G X 4MM MISC 32 G X 4MM, See Instructions, 11 refills    aspirin 81 mg oral tablet, 81 mg= 1 tab(s), Oral, daily, 3 refills    Cris Contour Next test strips, See Instructions, 3 refills    Crestor 10 mg oral tablet, 10 mg= 1 tab(s), Oral, hs, 3 refills    ibuprofen    lisinopril 10 mg oral tablet, 10 mg= 1 tab(s), Oral, daily, 3 refills    NovoLOG 100 units/mL injectable solution, See Instructions, 3 refills    one touch delica lancets, See Instructions, 3 refills    Viagra 100 mg oral tablet, See Instructions  Allergies    ampicillin (Rash)    simvastatin (myalgias)

## 2022-02-16 NOTE — LETTER
(Inserted Image. Unable to display)     February 08, 2019      CHAD LIDDLE  [address] [place], [place] [id number]          Dear QUITA,      Thank you for selecting [facility] (previously [place] [place] [facility]) for your healthcare needs.    Our records indicate you are due for the following services:    Diabetic Exam ~ Please bring your glucose meter and/or your blood glucose diary to your appointment.    Fasting Lab Tests ~ Please do not eat or drink anything 10 hours prior to your scheduled appointment time.  (Water and any medications that you may need are allowed unless directed otherwise.)    If you had your labs done at another facility or with Direct Access Lab Testing at [facility], please bring in a copy of the results to your next visit, mail a copy, or drop off a copy of your results to your Healthcare Provider.    You are due for lab work and an office visit; please schedule the lab appointment 1 week before the office visit.  This will assure all results are available to discuss with your provider during your visit.    **It is very helpful if you bring your medication bottles to your appointment.  This assures we have all of your current medications, including strength and dosing information, documented accurately in your medical record.    To schedule an appointment or if you have further questions, please contact your primary clinic:   [facility]       [phone]   [facility]       [phone]              [facility]     [phone]      Powered by [employer] and [employer]    Sincerely,    Tommy Damon MD

## 2022-02-16 NOTE — PROGRESS NOTES
Patient:   LIDDLE, CHAD A            MRN: [id number]            FIN: [id number]               Age:   41 years     Sex:  Male     :  1975   Associated Diagnoses:   Reactive airway disease; Diabetes Type I   Author:   Samir Tejeda MD      Visit Information      Date of Service: 2017 06:19 pm  Performing Location: [facility]  Encounter#: [id number]      Primary Care Provider (PCP):  Tommy Damon MD    NPI# [id number]      Referring Provider:  No referring provider recorded for selected visit.      Chief Complaint   2017 6:34 PM CST     cough unresolved with antibiotics        History of Present Illness   Became sick Thursday night. Started with cough, headaches and body aches. Seen and tested negative for influenza. Felt significantly improved with the nebulizer treatment on Monday night. Used albuterol inhaler 4x yesterday and none today. Did not start the prednisone. Still no energy but no more nausea. Appetite still decreased. Blood sugars in the 200's past couple days.      Review of Systems   Low grade temperature 99 over the weekend.   Gastrointestinal:  No nausea, No vomiting.    Hematology/Lymphatics:  No bruising tendency, No bleeding tendency.    Integumentary:  No rash.       Health Status   Allergies:    Allergic Reactions (Selected)  Severity Not Documented  Ampicillin (Rash)  Simvastatin (Myalgias)   Medications:  (Selected)   Prescriptions  Prescribed  1ST TIER UNIFINE PE 32G X 4MM MISC 32 G X 4MM: 1ST TIER UNIFINE PE 32G X 4MM MISC 32 G X 4MM, See Instructions, Instructions: Use to inject as needed., Supply, # 100 EA, 11 Refill(s), Type: Maintenance, Pharmacy: [facility], keep on file and pt will notify when needed, Use to...  Cialis 20 mg oral tablet: 1 tab(s) ( 20 mg ), po, daily, # 30 tab(s), 1 Refill(s), Pharmacy: [facility], keep on file and pt will notify when needed, 1 tab(s) po daily  Crestor 10 mg oral tablet: 1 tab(s) (  10 mg ), po, hs, # 90 tab(s), 3 Refill(s), Type: Maintenance, Pharmacy: [facility], 1 tab(s) po hs  Delica One Touch Ultra test Strips: Delica One Touch Ultra test Strips, See Instructions, Instructions: uses 4-5 daily, Supply, # 400 EA, 3 Refill(s), Type: Maintenance, Pharmacy: [facility], keep on file and pt will notify when needed, uses 4-5 daily  Lantus Solostar Pen 100 units/mL subcutaneous solution: ( 28 unit(s) ), subcutaneous, hs, # 30 mL, 1 Refill(s), Pharmacy: [facility], keep on file and pt will notify when needed, 28 unit(s) subcutaneous hs  NovoLOG FlexPen 100 units/mL subcutaneous solution: See Instructions, Instructions: INJECT 8-10 units with meals, # 30 mL, 1 Refill(s), Pharmacy: [facility], keep on file and pt will notify when needed, INJECT 8-10 units with meals  ONE TOUCH ULTRA TEST STRIPS: ONE TOUCH ULTRA TEST STRIPS, See Instructions, Instructions: TESTING 4 TIMES DAILY, Supply, # 400 EA, 3 Refill(s), Type: Maintenance, Pharmacy: [facility], TESTING 4 TIMES DAILY  ONE TOUCH ULTRA TEST STRIPS: ONE TOUCH ULTRA TEST STRIPS, See Instructions, Instructions: TESTS 4 TIMES DAILY, Supply, # 50 EA, 1 Refill(s), Type: Maintenance, Pharmacy: [facility], TESTS 4 TIMES DAILY  albuterol 90 mcg/inh inhalation aerosol: 2 puff(s), INH, QID, Instructions: aerochamber with valve, PRN: for wheezing, # 17 g, 0 Refill(s), Type: Maintenance, Pharmacy: [id number], 2 puff(s) inh qid,PRN:for wheezing,Instr:aerochamber with valve  aspirin 81 mg oral tablet: 1 tab(s) ( 81 mg ), PO, Daily, # 90 tab(s), 3 Refill(s), Type: Maintenance, Pharmacy: [facility], 1 tab(s) po daily  doxycycline monohydrate 100 mg oral capsule: 1 cap(s) ( 100 mg ), PO, bid, # 20 cap(s), 0 Refill(s), Type: Maintenance, Pharmacy: [facility], 1 cap(s) po bid,x10 day(s)  lisinopril 5 mg oral tablet: 1 tab(s) ( 5 mg ), PO, Daily,  # 90 tab(s), 3 Refill(s), Type: Maintenance, Pharmacy: [facility], 1 tab(s) po daily  predniSONE 20 mg oral tablet: 1 tab(s) ( 20 mg ), PO, Daily, # 7 tab(s), 0 Refill(s), Type: Maintenance, Pharmacy: [id number], 1 tab(s) po daily,x7 day(s)  Documented Medications  Documented  ibuprofen: 0 Refill(s), Type: Maintenance   Problem list:    All Problems  Diabetes Type I / ICD-9-.01 / Confirmed  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 / Confirmed  Dyslipidemia / ICD-9-.4 / Confirmed  Erectile Dysfunction / ICD-9-.84 / Confirmed  Tobacco Abuse-Unspec / ICD-9-.1 / Confirmed  Inactive: GERD (Gastroesophageal Reflux Disease) / ICD-9-.81  Inactive: history of diabeteic ketoacidosis  Resolved: Hospitalized for Diabetes being out of control  Resolved: Retinopathy due to Secondary Diabetes / ICD-9-.50  Canceled: Dyslipidemia / ICD-9-.4  Canceled: Diabetes mellitus type I / SNOMED CT 976704756      Histories   Procedure history:    Colonoscopy (SNOMED CT 533515898) performed by Dalton Lee in the month of 1/2015 at 39 Years.  Comments:  7/1/2015 10:07 AM - Monica WING, Ebonie  Colon, descending, polyp:  1.  Tubular adenoma  2.  No evidence of high grade dsyplasia  3.  Polyp size:5mm(resection/retrievel - complete)  - Repeat in 5yrs   Social History: Works at [employer]      Physical Examination   Vital Signs   2/8/2017 6:34 PM CST Temperature Tympanic 98.6 DegF    Peripheral Pulse Rate 71 bpm    Systolic Blood Pressure 124 mmHg    Diastolic Blood Pressure 77 mmHg      General:  Alert and oriented, No acute distress.    HENT:  Tympanic membranes are clear.    Respiratory:  Lungs are clear to auscultation.    Cardiovascular:  Normal rate, Regular rhythm.    Musculoskeletal:  Normal gait.    Neurologic:  No focal deficits.    Psychiatric:  Appropriate mood & affect.       Impression and Plan   Diagnosis     Reactive airway disease (QCK76-KR J45.909).      Diabetes Type I (ZBE89-VO E10.9).     Take albuterol inhaler 3x daily for 3-4 days and then taper. Continue doxycycline. Off work tomorrow and return on Friday.

## 2022-02-16 NOTE — CARE COORDINATION
Pt appears on [facility] chronic disease panel as out of parameters for DM/A1C/tobacco.  RTC letter to go out next week for CD visit and labs, will wiat for pt response, pt is on Crestor 10 mg,   provider has discussed smoking cessation.

## 2022-02-16 NOTE — PROGRESS NOTES
Patient:   LIDDLE, CHAD A            MRN: [id number]            FIN: [id number]               Age:   46 years     Sex:  Male     :  1975   Associated Diagnoses:   Tobacco Abuse-Unspec; Uncontrolled type I diabetes mellitus with proliferative retinopathy; Erectile dysfunction; Obstructive sleep apnea   Author:   Tommy Damon MD      Visit Information      Date of Service: 2021 05:20 pm  Performing Location: [facility]  Encounter#: [id number]      Primary Care Provider (PCP):  Tommy Damon MD    NPI# [id number]      Referring Provider:  Tommy Damon MD, NPI# [id number]      Chief Complaint   2021 5:26 PM CST    f/u chronic            Additional Information:No additional information recorded during visit.   Chief complaint and symptoms as noted above and confirmed with patient.  Recent lab and diagnostic studies reviewed with patient      History of Present Illness   2014: Presents to clinic to establish care, previously seen by [facility]; though more recently transferred his care at [facility], now wishing to return care more locally.  He has a long-time history of type 1 diabetes, generally with a fairly noncompliant course.  He uses Lantus 28 units daily at bedtime and NovoLog 10-14 units per meal, though never checking pre-meal blood sugar before insulin bolus.  He s had issues with admissions for DKA in the past.  He is interested in potentially pursuing insulin pump.  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  He continues to smoke on a daily basis.  He s been experiencing upper left-sided chest and shoulder discomfort that is not related to exertion.  He s also been experiencing abdominal pains for the last several days, typical of what is experienced with diabetic ketoacidosis in the past.    2021: Taye presents to clinic for diabetic  follow-up.  Overall doing well.  No significant health issues.  Does have new Medtronic pump and will be scheduling to meet with Sarah for programming and discussion of CGM S.  No further issues with testicular swelling or pain.  Now more resolved to receive Covid vaccination.  Doing well with penile injections.         Review of Systems   Constitutional:  No fever, No chills, No weakness, No fatigue.    Eye:  Negative except as documented in history of present illness.    Ear/Nose/Mouth/Throat:  Negative except as documented in history of present illness.    Respiratory:  No shortness of breath.    Cardiovascular:  No palpitations, No peripheral edema, No syncope.    Gastrointestinal:  No nausea, No vomiting, No abdominal pain.    Genitourinary:  ED, No dysuria, No hematuria, No change in urine stream.    Hematology/Lymphatics:  Negative except as documented in history of present illness.    Endocrine:  No excessive thirst, No polyuria.    Immunologic:  No recurrent fevers.    Musculoskeletal:  Neck pain, No joint pain, No muscle pain.    Neurologic:  Alert and oriented X4, Numbness, No confusion, No tingling, No headache.    Psychiatric:  No depression.       Health Status   Allergies:    Allergic Reactions (Selected)  Severity Not Documented  Ampicillin (Rash)  Simvastatin (Myalgias)   Medications:  (Selected)   Prescriptions  Prescribed  Auto Titrating CPAP 6-16 for daily use: Auto Titrating CPAP 6-16 for daily use, See Instructions, Instructions: Heated humidifier x1; Humidifier chamber x1;  Heated tubing x1; Full face mask of choice with headgear  x1; Cushion x 1;  Filters: Disposable x1pk & Reusable x1pk.  Length of Need...  Cris Contour Next test strips: Cris Contour Next test strips, See Instructions, Instructions: testing 4-8 x/ day, Supply, # 800 EA, 3 Refill(s), Type: Maintenance, Pharmacy: [facility], keep on hold and pt will notify when needed, testing 4-8 x/ day, 65,  in,...  Crestor 10 mg oral tablet: = 1 tab(s) ( 10 mg ), po, hs, # 90 tab(s), 3 Refill(s), Type: Maintenance, Pharmacy: [facility], keep on file and pt will notify when needed, 1 tab(s) Oral hs, 65, in, 10/28/21 17:54:00 CDT, Height Measured, 182.5, lb, 10/28/21 1...  NovoLOG 100 units/mL injectable solution: See Instructions, Instructions: 75 units via pump daily, # 90 mL, 1 Refill(s), Type: Maintenance, Pharmacy: [facility], keep on file, 75 units via pump daily, 65, in, 08/21/20 16:41:00 CDT, Height Measured, Weight Measured  aspirin 81 mg oral tablet: 1 tab(s) ( 81 mg ), PO, Daily, # 90 tab(s), 3 Refill(s), Type: Maintenance, Pharmacy: [facility], 1 tab(s) po daily  lisinopril 20 mg oral tablet: = 1 tab(s) ( 20 mg ), Oral, daily, # 90 tab(s), 3 Refill(s), Type: Maintenance, Pharmacy: [facility], 1 tab(s) Oral daily, 65, in, 10/28/21 17:54:00 CDT, Height Measured, 182.5, lb, 10/28/21 17:54:00 CDT, Weight Measured  one touch delica lancets: one touch delica lancets, See Instructions, Instructions: change needle daily-testing up to 8 times daily, Supply, # 400 EA, 3 Refill(s), Type: Maintenance, Pharmacy: [facility], keep on hold and pt will notify when needed, baires...  Documented Medications  Documented  ProstaScint 0.5 mg/mL intravenous solution: 0 Refill(s), Type: Maintenance,    Medications          *denotes recorded medication          Cris Contour Next test strips: See Instructions, testing 4-8 x/ day, 800 EA, 3 Refill(s).          Auto Titrating CPAP 6-16 for daily use: See Instructions, Heated humidifier x1; Humidifier chamber x1;  Heated tubing x1; Full face mask of choice with headgear  x1; Cushion x 1;  Filters: Disposable x1pk & Reusable x1pk.  Length of Need = 99 Months, 1 EA, 11 Refill(s).          one touch delica lancets: See Instructions, change needle daily-testing up to 8 times daily, 400 EA, 3  Refill(s).          aspirin 81 mg oral tablet: 81 mg, 1 tab(s), PO, Daily, 90 tab(s).          *ProstaScint 0.5 mg/mL intravenous solution: 0 Refill(s).          NovoLOG 100 units/mL injectable solution: See Instructions, 75 units via pump daily, 90 mL, 1 Refill(s).          lisinopril 20 mg oral tablet: 20 mg, 1 tab(s), Oral, daily, 90 tab(s), 3 Refill(s).          Crestor 10 mg oral tablet: 10 mg, 1 tab(s), po, hs, 90 tab(s), 3 Refill(s).       Problem list:    All Problems  Diabetes Type I / ICD-9-.01 / Confirmed  DM neuropathy, type I diabetes mellitus / SNOMED CT 606059363 / Confirmed  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 / Confirmed  Dyslipidemia / ICD-9-.4 / Confirmed  Erectile Dysfunction / ICD-9-.84 / Confirmed  Obstructive sleep apnea syndrome, severe / SNOMED CT 756123648 / Confirmed  Tobacco Abuse-Unspec / ICD-9-.1 / Confirmed  Inactive: GERD (Gastroesophageal Reflux Disease) / ICD-9-.81  Inactive: history of diabeteic ketoacidosis  Resolved: Hospitalized for Diabetes being out of control  Resolved: Retinopathy due to Secondary Diabetes / ICD-9-.50  Canceled: Dyslipidemia / ICD-9-.4  Canceled: Diabetes mellitus type I / SNOMED CT 277274105      Histories   Past Medical History:    Active  Diabetes Type I (250.01): Onset in the month of 9/1985 at 9 years  Tobacco Abuse-Unspec (305.1)  Resolved  Hospitalized for Diabetes being out of control: Onset in 1991 at 15 years.  Resolved.  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  Comments:  1/13/2014 CST 3:55 PM CST - Nelly Acuña MD  Status post laser therapy (Dr. Sherif Charles)   Family History:    Sleep apnea syndrome  Father  Sister (Tere)  Sister (Melissa)  Psoriasis  Father  Heart attack  Grandfather (P)  Hypercholesterolemia  Father  Seizure  Sister (Melissa)  Sister (Tere)  Angioplasty  Father  Uncle (P)  MI - Myocardial infarction  Uncle (P)  CAD - Coronary artery disease  Grandfather  (P)  Father  Uncle (P)     Procedure history:    Colonoscopy (609122740) in the month of 1/2015 at 39 Years.  Comments:  7/1/2015 10:07 AM CDT - Ebonie Anderson CMA  Colon, descending, polyp:  1.  Tubular adenoma  2.  No evidence of high grade dsyplasia  3.  Polyp size:5mm(resection/retrievel - complete)  - Repeat in 5yrs   Social History:        Electronic Cigarette/Vaping Assessment            Electronic Cigarette Use: Never.      Alcohol Assessment            Current, Beer (12 oz), 1-2 times per week, 2 drinks/episode average.  3 drinks/episode maximum.      Tobacco Assessment: Current            Current, Cigarettes, 10 per day.            10 or more cigarettes (1/2 pack or more)/day in last 30 days      Employment and Education Assessment            Employed, Work/School description: [employer].        Physical Examination   vital signs stable, as noted above   Vital Signs   12/2/2021 5:26 PM CST Temperature Tympanic 97.6 DegF  LOW    Peripheral Pulse Rate 77 bpm    Systolic Blood Pressure 135 mmHg  HI    Diastolic Blood Pressure 80 mmHg    Mean Arterial Pressure 98 mmHg    Oxygen Saturation 98 %      Measurements from flowsheet : Measurements   12/2/2021 5:26 PM CST Height Measured - Standard 65 in    Weight Measured - Standard 188.7 lb    BSA 1.98 m2    Body Mass Index 31.4 kg/m2  HI      General:  Alert and oriented, No acute distress.    Eye:  Pupils are equal, round and reactive to light, Extraocular movements are intact.    HENT:  Normocephalic, Tympanic membranes are clear.    Neck:  Supple, Non-tender.    Respiratory:  Lungs are clear to auscultation, Respirations are non-labored.    Cardiovascular:  Normal rate, Regular rhythm, No murmur, No edema.    Gastrointestinal:  Soft, Non-tender, Non-distended, Normal bowel sounds, wearing insulin pump.    Neurologic:  Alert, Oriented, Normal motor function, No focal deficits.    Cognition and Speech:  Oriented, Speech clear and coherent, Functional cognition  intact.    Psychiatric:  Appropriate mood & affect.       Review / Management   Results review:  Lab results   10/15/2021 2:31 PM CDT Urine Color Urine Dipstick Yellow    Urine Appearance Urine Dipstick Clear    pH Urine Dipstick 5.5    Specific Gravity Urine Dipstick 1.015    Glucose Urine Dipstick Greater than or equal to 1000    Bilirubin Urine Dipstick Negative    Ketones Urine Dipstick Negative    Blood Urine Dipstick Negative    Protein Urine Dipstick Negative    Nitrite Urine Dipstick Negative    Leukocyte Esterase Urine Dipstick Negative    Urobilinogen Urine Dipstick 0.2 mg/dl    POC Test Comments Performed at [facility]   10/15/2021 1:50 PM CDT Chlam/GC Comments See comment    Chlamydia RNA NOT DETECTED    Neisseria gonorrhoeae RNA NOT DETECTED    Urine Culture See comment   .       Impression and Plan   Diagnosis     Tobacco Abuse-Unspec (CTA42-QW Z72.0).     Uncontrolled type I diabetes mellitus with proliferative retinopathy (WSD15-QW E10.3599).     Erectile dysfunction (UIB32-VY N52.9).     Obstructive sleep apnea (DCR42-GG G47.33).         .) type I Dm, uncontrolled (hyperglycemia)  hypoglycemic medications: none  insulin therapy: Medtronic insulin pump (needing pump set-up/programming with CDE)   - ICR 1:6.5   - basal rate: 0.975-1.05   - sensitivity 30mg/dL   - working with Sarah  last HbA1c: 9.2%   microvascular complications: proliferative retinopathy, + DOROTHY, neuropathy   - doesn't think neuropathic pain needing medication at this time  macrovascular complications: none known  ASA/AIDEN/statin:  ASA 81mg daily, lisinopril 10mg daily, rosuvastatin 10mg daily    - smoking cessation counseling    .) erectile dysfunction - almost certainly related to diabetic neuropathy/vascular disease   - poor response to Viagra and Cialis   - working with Urology; seeing a lot of success with penile injections    .) DAYANA, on CPAP  - seeing benefit with nightly use    .) health maintenance  - previous  colonoscopy in 2015; h/o tubular adenoma; arrange for colonoscopy now  - COVID vaccination introduction today    RTC in 4 months

## 2022-02-16 NOTE — PROGRESS NOTES
Patient:   LIDDLE, CHAD A            MRN: [id number]            FIN: [id number]               Age:   42 years     Sex:  Male     :  1975   Associated Diagnoses:   Tobacco Abuse-Unspec; Uncontrolled type I diabetes mellitus with proliferative retinopathy; Erectile dysfunction   Author:   Tommy Damon MD      Visit Information      Date of Service: 02/15/2018 06:00 pm  Performing Location: [facility]  Encounter#: [id number]      Primary Care Provider (PCP):  Tommy Damon MD    NPI# [id number]      Referring Provider:  Tommy Damon MD# [id number]      Chief Complaint   2/15/2018 6:10 PM CST    DM check              Additional Information:No additional information recorded during visit.   Chief complaint and symptoms as noted above and confirmed with patient.  Recent lab and diagnostic studies reviewed with patient      History of Present Illness   2014: Presents to clinic to establish care, previously seen by [facility]; though more recently transferred his care at [facility], now wishing to return care more locally.  He has a long-time history of type 1 diabetes, generally with a fairly noncompliant course.  He uses Lantus 28 units daily at bedtime and NovoLog 10-14 units per meal, though never checking pre-meal blood sugar before insulin bolus.  He s had issues with admissions for DKA in the past.  He is interested in potentially pursuing insulin pump.  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  He continues to smoke on a daily basis.  He s been experiencing upper left-sided chest and shoulder discomfort that is not related to exertion.  He s also been experiencing abdominal pains for the last several days, typical of what is experienced with diabetic ketoacidosis in the past.    2017: Returns to clinic feeling well.  No recent diabetic changes; using  spreadsheet provided; denies any hypoglycemia.  Still issues with decipline checking BS and taking insulin.  Knows he can do better.  Does express genuine interest in insulin pump use.  Erectile function significantly worse; now no real erection despite cialis 20mg use.    2/15/2018: Taye returns for regular diabetic follow-up.  This past fall he did get started on a Medtronic insulin pump and continuous glucose monitor.  He has love this transition and says he would never go back.  Did have issues with hypoglycemia with a regional pump settings.  Since that time is been doing better.  Current pump settings include basal rate of 1.05 U/h, sensitivity of 25 mg/dL, insulin to carbohydrate ratio of 1-4.  Does describe having some worsening foot neuropathic pains especially at night.          Review of Systems   Constitutional:  No fever, No chills.    Eye:  Negative except as documented in history of present illness.    Ear/Nose/Mouth/Throat:  Negative except as documented in history of present illness.    Respiratory:  No shortness of breath.    Cardiovascular:  No palpitations, No peripheral edema, No syncope.    Gastrointestinal:  No nausea, No vomiting.    Genitourinary:  No dysuria, No hematuria.    Hematology/Lymphatics:  Negative except as documented in history of present illness.    Endocrine:  No excessive thirst, No polyuria.    Immunologic:  No recurrent fevers.    Musculoskeletal:  No joint pain, No muscle pain.    Neurologic:  Alert and oriented X4, Numbness, No tingling, No headache.       Health Status   Allergies:    Allergic Reactions (Selected)  Severity Not Documented  Ampicillin (Rash)  Simvastatin (Myalgias)   Medications:  (Selected)   Outpatient Medications  Ordered  Adacel (Tdap): 0.5 mL, im, once  Prescriptions  Prescribed  1ST TIER UNIFINE PE 32G X 4MM MISC 32 G X 4MM: 1ST TIER UNIFINE PE 32G X 4MM MISC 32 G X 4MM, See Instructions, Instructions: Use to inject as needed., Supply, # 100 EA, 11  Refill(s), Type: Maintenance, Pharmacy: [id number], keep on file and pt will notify when needed, Use to inject as...  Cris Contour Next test strips: Cris Contour Next test strips, See Instructions, Instructions: testing 4x/ day, Supply, # 8 box(es), 3 Refill(s), Type: Maintenance, Pharmacy: [facility], testing 4x/ day  Crestor 10 mg oral tablet: 1 tab(s) ( 10 mg ), po, hs, # 90 tab(s), 3 Refill(s), Type: Maintenance, Pharmacy: [id number], keep on file and pt will notify when needed, 1 tab(s) po hs  NovoLOG 100 units/mL subcutaneous solution: See Instructions, Instructions: using up to 75u daily, # 70 mL, 3 Refill(s), Type: Maintenance, Pharmacy: [facility], using up to 75u daily  aspirin 81 mg oral tablet: 1 tab(s) ( 81 mg ), PO, Daily, # 90 tab(s), 3 Refill(s), Type: Maintenance, Pharmacy: [facility], 1 tab(s) po daily  lisinopril 5 mg oral tablet: 1 tab(s) ( 5 mg ), PO, Daily, # 90 tab(s), 3 Refill(s), Type: Maintenance, Pharmacy: [id number], keep on file and pt will notify when needed, 1 tab(s) po daily  one touch delica lancets: one touch delica lancets, See Instructions, Instructions: change needle daily, Supply, # 3 box(es), 3 Refill(s), Type: Maintenance, Pharmacy: [facility], change needle daily  Documented Medications  Documented  Viagra 100 mg oral tablet: See Instructions, Instructions: 1 tab(s)   1 hour before sexual activity, # 6 tab(s), 0 Refill(s), Type: Maintenance, Written Rx per Dr Prater  ibuprofen: 0 Refill(s), Type: Maintenance   Problem list:    All Problems  Diabetes Type I / ICD-9-.01 / Confirmed  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 / Confirmed  Dyslipidemia / ICD-9-.4 / Confirmed  Erectile Dysfunction / ICD-9-.84 / Confirmed  Tobacco Abuse-Unspec / ICD-9-.1 / Confirmed  Inactive: GERD (Gastroesophageal Reflux Disease) / ICD-9-.81  Inactive: history of  diabeteic ketoacidosis  Resolved: Hospitalized for Diabetes being out of control  Resolved: Retinopathy due to Secondary Diabetes / ICD-9-.50  Canceled: Dyslipidemia / ICD-9-.4  Canceled: Diabetes mellitus type I / SNOMED CT [id number]      Histories   Past Medical History:    Active  Diabetes Type I (250.01): Onset in the month of 9/1985 at 9 years  Tobacco Abuse-Unspec (305.1)  Resolved  Hospitalized for Diabetes being out of control: Onset in 1991 at 15 years.  Resolved.  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  Comments:  1/13/2014 CST 3:55 PM CST - Arianne HANEY, Nelly  Status post laser therapy (Dr. Sherif Charles)   Family History:    Psoriasis  Father  Heart attack  Grandfather (P)  Hypercholesterolemia  Father  Seizure  Sister (Melissa)  Sister (Tere)  Angioplasty  Father  Uncle (P)  MI - Myocardial infarction  Uncle (P)  CAD - Coronary artery disease  Grandfather (P)  Father  Uncle (P)     Procedure history:    Colonoscopy (245628101) in the month of 1/2015 at 39 Years.  Comments:  7/1/2015 10:07 AM - [place] CMA, Ebonie  Colon, descending, polyp:  1.  Tubular adenoma  2.  No evidence of high grade dsyplasia  3.  Polyp size:5mm(resection/retrievel - complete)  - Repeat in 5yrs   Social History:        Alcohol Assessment            Current, Beer (12 oz), 1-2 times per week, 2 drinks/episode average.  3 drinks/episode maximum.      Tobacco Assessment: Current            Current, Cigarettes, 10 per day.      Employment and Education Assessment            Employed, Work/School description: [employer].        Physical Examination   vital signs stable, as noted above   Vital Signs   2/15/2018 6:10 PM CST Temperature Tympanic 97.6 DegF  LOW    Peripheral Pulse Rate 88 bpm    Systolic Blood Pressure 138 mmHg  HI    Diastolic Blood Pressure 76 mmHg    Mean Arterial Pressure 97 mmHg    BP Site Right arm      Measurements from flowsheet : Measurements   2/15/2018 6:10 PM CST    Weight Measured -  Standard                183.6 lb     General:  Alert and oriented, No acute distress.    Eye:  Extraocular movements are intact.    HENT:  Tympanic membranes are clear, No pharyngeal erythema.    Respiratory:  Lungs are clear to auscultation, Respirations are non-labored.    Cardiovascular:  Regular rhythm, No edema.    Gastrointestinal:  Soft, Non-tender, Non-distended, Normal bowel sounds, wearing insulin pump.    Neurologic:  Alert, Oriented, Normal motor function, No focal deficits.    Cognition and Speech:  Oriented, Speech clear and coherent.    Psychiatric:  Appropriate mood & affect.       Review / Management   Results review:  Lab results   2/12/2018 8:32 AM CST Hgb A1c 7.9  HI    Cholesterol 174 mg/dL    Non-    HDL 60 mg/dL    Chol/HDL Ratio 2.9    LDL 98    Triglyceride 71 mg/dL   .       Impression and Plan   Diagnosis     Tobacco Abuse-Unspec (YLU20-RI Z72.0).     Uncontrolled type I diabetes mellitus with proliferative retinopathy (FCB98-IH E10.359).     Erectile dysfunction (ZYB37-LR N52.9).         .) type I Dm, controlled  hypoglycemic medications: none  insulin therapy: Medtronic insulin pump/CGMS   - I adjusted his ICR from 1:4 to 1:5 (has been over-riding bolus wizard regularly by reducing insulin dosing by 1-2 units)   - basal rate: 1.05   - sensitivity 25mg/dL   - following with Sarah  last HbA1c: 7.9%   microvascular complications: proliferative retinopathy, + DOROTHY, neuropathy   - doesn't think neuropathic pain needing medication at this time  macrovascular complications: none known  ASA/AIDEN/statin:  ASA 81mg daily, lisinopril 5mg daily, rosuvastatin 10mg daily   - smoking cessation counseling    .) erectile dysfunction - almost certainly related to diabetic neuropathy/vascular disease    .) weight gains   - lifestyle modification     RTC in 4 months         Professional Services   Counseling Summary:  This was a 25 minute visit with greater than 50% of that time spent counseling the  patient.

## 2022-02-16 NOTE — LETTER
(Inserted Image. Unable to display)   August 19, 2020      CHAD LIDDLE  [address] [place], [place] [id number]        Dear QUITA,     Thank you for selecting [facility] ([facility]viously [facility] for your healthcare needs. Below you will find the results of your recent test(s) done at our clinic.       Labs for your review.  We can discuss in more detail at future clinic visit.       Result Name Current Result Previous Result Reference Range   Hgb A1c ((H)) 8.6 8/17/2020 ((H)) 7.8 7/11/2019  - <5.7   Cholesterol (mg/dL)  184 8/17/2020   - <200   Non-HDL Cholesterol  124 8/17/2020   - <130   HDL (mg/dL)  60 8/17/2020  > OR = 40 -    Cholesterol/HDL Ratio  3.1 8/17/2020   - <5.0   LDL ((H)) 106 8/17/2020     Triglyceride (mg/dL)  88 8/17/2020   - <150       Please contact me or my assistant at [phone] if you have any questions or concerns.     Sincerely,        Tommy Damon MD    What do your labs mean?  Below is a glossary of commonly ordered labs:  LDL - Bad Cholesterol  HDL - Good Cholesterol  AST/ALT - Liver Function  Cr/Creatinine - Kidney Function  Microalbumin - Kidney Function  BUN - Kidney Function  PSA - Prostate   TSH - Thyroid Hormone  HgbA1c - Diabetes Test  Hgb (Hemoglobin) - Red Blood Cells   no transient paralysis/no weakness/no paresthesias/no generalized seizures/no focal seizures/no syncope/no difficulty walking

## 2022-02-16 NOTE — PROGRESS NOTES
Patient:   LIDDLE, CHAD A            MRN: [id number]            FIN: [id number]               Age:   46 years     Sex:  Male     :  1975   Associated Diagnoses:   Tobacco Abuse-Unspec; Uncontrolled type I diabetes mellitus with proliferative retinopathy; Erectile dysfunction; Obstructive sleep apnea   Author:   Tommy Damon MD      Visit Information      Date of Service: 2021 07:12 am  Performing Location: [facility]  Encounter#: [id number]      Primary Care Provider (PCP):  Tommy Damon MD    NPI# [id number]      Referring Provider:  Tommy Damon MD    NPI# [id number]      Chief Complaint   2021 7:22 AM CST   BS running high for the past wee- feeling lightheaded/dizzy - has vomiited x 3 franco to dizziness              Additional Information:No additional information recorded during visit.   Chief complaint and symptoms as noted above and confirmed with patient.  Recent lab and diagnostic studies reviewed with patient      History of Present Illness   2014: Presents to clinic to establish care, previously seen by [facility]; though more recently transferred his care at [facility], now wishing to return care more locally.  He has a long-time history of type 1 diabetes, generally with a fairly noncompliant course.  He uses Lantus 28 units daily at bedtime and NovoLog 10-14 units per meal, though never checking pre-meal blood sugar before insulin bolus.  He s had issues with admissions for DKA in the past.  He is interested in potentially pursuing insulin pump.  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  He continues to smoke on a daily basis.  He s been experiencing upper left-sided chest and shoulder discomfort that is not related to exertion.  He s also been experiencing abdominal pains for the last several days, typical of what is experienced with  diabetic ketoacidosis in the past.    12/2/2021: Taye presents to clinic for diabetic follow-up.  Overall doing well.  No significant health issues.  Does have new Medtronic pump and will be scheduling to meet with Sarah for programming and discussion of CGM S.  No further issues with testicular swelling or pain.  Now more resolved to receive Covid vaccination.  Doing well with penile injections.  12/28/2021: Taye presents to clinic with approximately week and a half history of intermittent lightheadedness, weakness and chest discomfort.  Describes when trying to do something he feels an unusual feeling in his chest, at times feels like his heart will just beat out of his chest.  Also describes underlying pressure.  He does feel better when resting and when lying down.  Has been more dedicated to taking his lisinopril and Crestor release for the last 2 months; historically had not been well adherent with oral medication.  Strong underlying family history of early onset heart disease.  No fevers or chills.         Review of Systems   Constitutional:  Weakness, Decreased activity, No fever, No chills, No fatigue.    Eye:  Negative except as documented in history of present illness.    Ear/Nose/Mouth/Throat:  Negative except as documented in history of present illness.    Respiratory:  No shortness of breath.    Cardiovascular:  Palpitations, No peripheral edema, No syncope.         Chest pain: Midsternal.    Gastrointestinal:  No nausea, No vomiting, No abdominal pain.    Genitourinary:  ED, No dysuria, No hematuria, No change in urine stream.    Hematology/Lymphatics:  Negative except as documented in history of present illness.    Endocrine:  No excessive thirst, No polyuria.    Immunologic:  No recurrent fevers.    Musculoskeletal:  Neck pain, No joint pain, No muscle pain.    Neurologic:  Alert and oriented X4, Numbness, No confusion, No tingling, No headache.    Psychiatric:  Anxiety, No depression.       Health  Status   Allergies:    Allergic Reactions (Selected)  Severity Not Documented  Ampicillin (Rash)  Simvastatin (Myalgias)   Medications:  (Selected)   Prescriptions  Prescribed  Auto Titrating CPAP 6-16 for daily use: Auto Titrating CPAP 6-16 for daily use, See Instructions, Instructions: Heated humidifier x1; Humidifier chamber x1;  Heated tubing x1; Full face mask of choice with headgear  x1; Cushion x 1;  Filters: Disposable x1pk & Reusable x1pk.  Length of Need...  Cris Contour Next test strips: Cris Contour Next test strips, See Instructions, Instructions: testing 4-8 x/ day, Supply, # 800 EA, 3 Refill(s), Type: Maintenance, Pharmacy: [facility], keep on hold and pt will notify when needed, testing 4-8 x/ day, 65, in,...  Crestor 10 mg oral tablet: = 1 tab(s) ( 10 mg ), po, hs, # 90 tab(s), 3 Refill(s), Type: Maintenance, Pharmacy: [facility], keep on file and pt will notify when needed, 1 tab(s) Oral hs, 65, in, 10/28/21 17:54:00 CDT, Height Measured, 182.5, lb, 10/28/21 1...  HumaLOG 100 units/mL injectable solution: See Instructions, Instructions: 75 units via pump, # 90 mL, 1 Refill(s), Type: Maintenance, Pharmacy: [facility], in place of Novolog due to insurance, 75 units via pump, 65, in, 12/02/21 17:26:00 CST, Height Measured, 188.7, lb,...  aspirin 325 mg oral delayed release tablet: = 1 tab(s) ( 325 mg ), Oral, daily, # 30 tab(s), 0 Refill(s), Type: Maintenance, called to pharmacy (Rx)  lisinopril 20 mg oral tablet: 0.5 tab, Oral, daily, # 45 tab(s), 3 Refill(s), Type: Maintenance, Pharmacy: [facility], 65, in, 10/28/21 17:54:00 CDT, Height Measured, 182.5, lb, 10/28/21 17:54:00 CDT, Weight Measured  nitroglycerin 0.4 mg sublingual tablet: = 1 tab(s) ( 0.4 mg ), SL, q5 min, Instructions: not to exceed 3 doses/15 min--if pain persists, seek medical attention, PRN: for chest pain, # 100 tab(s), 1 Refill(s), Type:  Maintenance, Pharmacy: [facility], 1 tab(s) SL q5 min,PRN:for chest pain,I...  one touch delica lancets: one touch delica lancets, See Instructions, Instructions: change needle daily-testing up to 8 times daily, Supply, # 400 EA, 3 Refill(s), Type: Maintenance, Pharmacy: [facility], keep on hold and pt will notify when needed, baires...  Documented Medications  Documented  ProstaScint 0.5 mg/mL intravenous solution: 0 Refill(s), Type: Maintenance,    Medications          *denotes recorded medication          Cris Contour Next test strips: See Instructions, testing 4-8 x/ day, 800 EA, 3 Refill(s).          Auto Titrating CPAP 6-16 for daily use: See Instructions, Heated humidifier x1; Humidifier chamber x1;  Heated tubing x1; Full face mask of choice with headgear  x1; Cushion x 1;  Filters: Disposable x1pk & Reusable x1pk.  Length of Need = 99 Months, 1 EA, 11 Refill(s).          one touch delica lancets: See Instructions, change needle daily-testing up to 8 times daily, 400 EA, 3 Refill(s).          aspirin 325 mg oral delayed release tablet: 325 mg, 1 tab(s), Oral, daily, 30 tab(s), 0 Refill(s).          *ProstaScint 0.5 mg/mL intravenous solution: 0 Refill(s).          HumaLOG 100 units/mL injectable solution: See Instructions, 75 units via pump, 90 mL, 1 Refill(s).          lisinopril 20 mg oral tablet: 0.5 tab, Oral, daily, 45 tab(s), 3 Refill(s).          nitroglycerin 0.4 mg sublingual tablet: 0.4 mg, 1 tab(s), SL, q5 min, not to exceed 3 doses/15 min--if pain persists, seek medical attention, PRN: for chest pain, 100 tab(s), 1 Refill(s).          Crestor 10 mg oral tablet: 10 mg, 1 tab(s), po, hs, 90 tab(s), 3 Refill(s).       Problem list:    All Problems  Diabetes Type I / ICD-9-.01 / Confirmed  DM neuropathy, type I diabetes mellitus / SNOMED CT 676556946 / Confirmed  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 / Confirmed  Dyslipidemia / ICD-9-.4 / Confirmed  Erectile  Dysfunction / ICD-9-.84 / Confirmed  Obstructive sleep apnea syndrome, severe / SNOMED CT 063992082 / Confirmed  Tobacco Abuse-Unspec / ICD-9-.1 / Confirmed  Inactive: GERD (Gastroesophageal Reflux Disease) / ICD-9-.81  Inactive: history of diabeteic ketoacidosis  Resolved: Hospitalized for Diabetes being out of control  Resolved: Retinopathy due to Secondary Diabetes / ICD-9-.50  Canceled: Dyslipidemia / ICD-9-.4  Canceled: Diabetes mellitus type I / SNOMED CT 364267026      Histories   Past Medical History:    Active  Diabetes Type I (250.01): Onset in the month of 9/1985 at 9 years  Tobacco Abuse-Unspec (305.1)  Resolved  Hospitalized for Diabetes being out of control: Onset in 1991 at 15 years.  Resolved.  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  Comments:  1/13/2014 CST 3:55 PM CST - Arianne HANEY, Nelly  Status post laser therapy (Dr. Sherif Charles)   Family History:    Sleep apnea syndrome  Father  Sister (Tere)  Sister (Melissa)  Psoriasis  Father  Heart attack  Grandfather (P)  Hypercholesterolemia  Father  Seizure  Sister (Melissa)  Sister (Tere)  Angioplasty  Father  Uncle (P)  MI - Myocardial infarction  Uncle (P)  CAD - Coronary artery disease  Grandfather (P)  Father  Uncle (P)     Procedure history:    Colonoscopy ([id number]) in the month of 1/2015 at 39 Years.  Comments:  7/1/2015 10:07 AM CDT - Monica CMA, Ebonie  Colon, descending, polyp:  1.  Tubular adenoma  2.  No evidence of high grade dsyplasia  3.  Polyp size:5mm(resection/retrievel - complete)  - Repeat in 5yrs   Social History:        Electronic Cigarette/Vaping Assessment            Electronic Cigarette Use: Never.      Alcohol Assessment            Current, Beer (12 oz), 1-2 times per week, 2 drinks/episode average.  3 drinks/episode maximum.      Tobacco Assessment: Current            Current, Cigarettes, 10 per day.            10 or more cigarettes (1/2 pack or more)/day in last 30 days      Employment  and Education Assessment            Employed, Work/School description: [employer].        Physical Examination   vital signs stable, as noted above   Vital Signs   12/28/2021 7:22 AM CST Temperature Tympanic 98.7 DegF    Oxygen Saturation 99 %    Systolic Blood Pressure Supine 112 mmHg    Diastolic Blood Pressure Supine 67 mmHg    Pulse Supine 81 bpm    Systolic Blood Pressure Sitting 104 mmHg    Diastolic Blood Pressure Sitting 57 mmHg    Pulse Sitting 77 bpm  HI    Systolic Blood Pressure Standing 119 mmHg    Diastolic Blood Pressure Standing 73 mmHg    Pulse Standing 77 bpm      Measurements from flowsheet : Measurements   12/28/2021 7:22 AM CST Height Measured - Standard 65 in    Weight Measured - Standard 186.8 lb    BSA 1.97 m2    Body Mass Index 31.08 kg/m2  HI      General:  Alert and oriented, No acute distress.    Eye:  Pupils are equal, round and reactive to light, Extraocular movements are intact.    HENT:  Normocephalic, Tympanic membranes are clear.    Neck:  Supple, Non-tender, No carotid bruit.    Respiratory:  Lungs are clear to auscultation, Respirations are non-labored.    Cardiovascular:  Normal rate, Regular rhythm, No murmur, No edema.    Gastrointestinal:  Soft, Non-tender, Non-distended, Normal bowel sounds, wearing insulin pump.    Neurologic:  Alert, Oriented, Normal motor function, No focal deficits.    Cognition and Speech:  Oriented, Speech clear and coherent, Functional cognition intact.    Psychiatric:  Appropriate mood & affect.       Review / Management   Results review:  Lab results   12/2/2021 6:12 PM CST Hgb A1c 10.1  HI    Cholesterol 157 mg/dL    Non-HDL 94    HDL 63 mg/dL    Chol/HDL Ratio 2.5    LDL 74    Triglyceride 112 mg/dL   10/15/2021 2:31 PM CDT Urine Color Urine Dipstick Yellow    Urine Appearance Urine Dipstick Clear    pH Urine Dipstick 5.5    Specific Gravity Urine Dipstick 1.015    Glucose Urine Dipstick Greater than or equal to 1000    Bilirubin Urine Dipstick  Negative    Ketones Urine Dipstick Negative    Blood Urine Dipstick Negative    Protein Urine Dipstick Negative    Nitrite Urine Dipstick Negative    Leukocyte Esterase Urine Dipstick Negative    Urobilinogen Urine Dipstick 0.2 mg/dl    POC Test Comments Performed at [facility]   10/15/2021 1:50 PM CDT Chlam/GC Comments See comment    Chlamydia RNA NOT DETECTED    Neisseria gonorrhoeae RNA NOT DETECTED    Urine Culture See comment   .       Impression and Plan   Diagnosis     Tobacco Abuse-Unspec (CGM81-US Z72.0).     Uncontrolled type I diabetes mellitus with proliferative retinopathy (OJZ41-SK E10.3599).     Erectile dysfunction (ZCN39-JF N52.9).     Obstructive sleep apnea (FOF02-KJ G47.33).         .) chest pains, palpitations - concerning for unstable angina features (heightened risk for CVD - type I DM, tobacco, HTN, family history)  ECG: NSR, no acute ST, T wave changes  - checking CBC, BMP, troponin  - arrange for treadmill nuclear stress testing  - arrange for echo  - referral to cardiology  - advised to increase ASA to 325mg daily  - Rx for nitroglycerin    .) hypertension, uncontrolled (hypotension)  current antihypertensive regimen: lisinopril 20mg daily  regimen changes: advised to reduced lisinopril to 10mg daily  intolerance:  future titration/work-up plan:     .) type I Dm, uncontrolled (hyperglycemia)  hypoglycemic medications: none  insulin therapy: Medtronic insulin pump (needing pump set-up/programming with CDE)   - ICR 1:6.5   - basal rate: 0.975-1.05   - sensitivity 30mg/dL   - working with Sarah  last HbA1c: 10.1%   microvascular complications: proliferative retinopathy, + DOROTHY, neuropathy   - doesn't think neuropathic pain needing medication at this time  macrovascular complications: none known  ASA/AIDEN/statin:  ASA 81mg daily, lisinopril 20mg daily, rosuvastatin 10mg daily    - smoking cessation counseling    .) erectile dysfunction - almost certainly related to diabetic  neuropathy/vascular disease   - poor response to Viagra and Cialis   - working with Urology; seeing a lot of success with penile injections    .) DAYANA, on CPAP  - seeing benefit with nightly use    .) health maintenance  - previous colonoscopy in 2015; h/o tubular adenoma; arrange for colonoscopy now  - COVID vaccination x 1 dose mRNA    RTC as previously scheduled         Professional Services   Counseling Summary:  This was a 25 minute visit with greater than 50% of that time spent counseling the patient.

## 2022-02-16 NOTE — NURSING NOTE
Comprehensive Intake Entered On:  2020 4:45 PM CDT    Performed On:  2020 4:41 PM CDT by Ebonie Adnerson CMA               Summary   Chief Complaint :   f/u chronic   Weight Measured :   171.8 lb(Converted to: 171 lb 13 oz, 77.93 kg)    Height Measured :   65 in(Converted to: 5 ft 5 in, 165.10 cm)    Body Mass Index :   28.59 kg/m2 (HI)    Body Surface Area :   1.89 m2   Systolic Blood Pressure :   120 mmHg   Diastolic Blood Pressure :   76 mmHg   Mean Arterial Pressure :   91 mmHg   Peripheral Pulse Rate :   100 bpm   Temperature Tympanic :   97.8 DegF(Converted to: 36.6 DegC)  (LOW)    Ebonie Anderson CMA 2020 4:41 PM CDT   Health Status   Allergies Verified? :   Yes   Medication History Verified? :   Yes   Immunizations Current :   Yes   Medical History Verified? :   Yes   Pre-Visit Planning Status :   Completed   Tobacco Use? :   Current every day smoker   Ebonie Anderson CMA 2020 4:41 PM CDT   Demographics   Last Name :   Liddle   Address :   [address] [place] [address]   First Name :   Taye Cotton Initial :   A   Responsible Party Date of Birth () :   1975 CDT   City :   [place]   State :   [place]   Zip Code :   [address]   Ebonie Anderson CMA 2020 4:41 PM CDT   Anc[facility] Services Grid   Name :    [facility]              Type of Service :    Pharmacy              Contact Information :    [phone]              Location :    [address] [place] [address]                Ebonie Anderson CMA 2020 4:41 PM CDT         Consents   Consent for Immunization Exchange :   Consent Granted   Consent for Immunizations to Providers :   Consent Granted   Ebonie Anderson CMA 2020 5:24 PM CDT   ID Risk Screen   Recent Travel History :   No recent travel   Family Member Travel History :   No recent travel   Other Exposure to Infectious Disease :   Unknown   Ebonie Anderson CMA 2020 4:41 PM CDT   Social History   Social History   (As Of: 2020 4:45:34 PM CDT)   Alcohol:         Current, Beer (12 oz), 1-2 times per week, 2 drinks/episode average.  3 drinks/episode maximum.   (Last Updated: 12/16/2016 10:29:17 AM CST by Ebonie Anderson CMA)          Tobacco:  Current      Current, Cigarettes, 10 per day.   (Last Updated: 6/9/2010 3:17:18 PM CDT by Yani Santana CMA)          Employment/School:        Employed, Work/School description: [employer].   (Last Updated: 8/31/2010 3:21:41 PM CDT by Srinivas Brown MD)

## 2022-02-16 NOTE — TELEPHONE ENCOUNTER
---------------------  From: Rosina West LPN (Phone Messages Pool ([id number]_[facility]))   Sent: 12/18/2020 12:25:14 PM CST  Subject: results note     Phone Message    PCP:   NAHUN      Time of Call:  12:01pm       Person Calling:  pt  Phone number:  [phone]    Returned call at: 12:19pm    Note:   Pt LM stating he had covid test with [facility] and tested positive. Pt says he needs documentation sent to his work that he is positive. Send to [employer] attn: Zamzam Tejeda fax [phone].    Returned call and informed pt info faxed.    Last office visit and reason:  12/15/20 A video encounter- suspected covid-19 with [facility]

## 2022-02-16 NOTE — PROGRESS NOTES
Patient:   LIDDLE, CHAD A            MRN: [id number]            FIN: [id number]               Age:   46 years     Sex:  Male     :  1975   Associated Diagnoses:   Tobacco Abuse-Unspec; Uncontrolled type I diabetes mellitus with proliferative retinopathy; Erectile dysfunction; Obstructive sleep apnea   Author:   Tommy Damon MD      Visit Information      Date of Service: 10/28/2021 05:37 pm  Performing Location: [facility]  Encounter#: [id number]      Primary Care Provider (PCP):  Tommy Damon MD    NPI# [id number]      Referring Provider:  Tommy Damon MD    NPI# [id number]      Chief Complaint            Additional Information:No additional information recorded during visit.   Chief complaint and symptoms as noted above and confirmed with patient.  Recent lab and diagnostic studies reviewed with patient      History of Present Illness   2014: Presents to clinic to establish care, previously seen by [facility]; though more recently transferred his care at [facility], now wishing to return care more locally.  He has a long-time history of type 1 diabetes, generally with a fairly noncompliant course.  He uses Lantus 28 units daily at bedtime and NovoLog 10-14 units per meal, though never checking pre-meal blood sugar before insulin bolus.  He s had issues with admissions for DKA in the past.  He is interested in potentially pursuing insulin pump.  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  He continues to smoke on a daily basis.  He s been experiencing upper left-sided chest and shoulder discomfort that is not related to exertion.  He s also been experiencing abdominal pains for the last several days, typical of what is experienced with diabetic ketoacidosis in the past.    10/28/2021: Taye returns to clinic for follow-up.  Seen in clinic earlier this month  related to left testicular pains.  Did have ultrasound that ruled out torsion and was suggestive of a left-sided orchitis.  Just now completing 14 days of Cipro.  States he still has having significant pains though today seems like a good day overall.  Looking to upgrade his insulin pump and will think about using CGM S.  Has not been taking lisinopril or rosuvastatin.  Has been more open minded about pursuits of Covid vaccination.  Would like to move forward with flu shot today.            Review of Systems   Constitutional:  No fever, No chills, No weakness, No fatigue.    Eye:  Negative except as documented in history of present illness.    Ear/Nose/Mouth/Throat:  Negative except as documented in history of present illness.    Respiratory:  No shortness of breath.    Cardiovascular:  No palpitations, No peripheral edema, No syncope.    Gastrointestinal:  No nausea, No vomiting, No abdominal pain.    Genitourinary:  ED, left groin/testicular pains, No dysuria, No hematuria, No change in urine stream.    Hematology/Lymphatics:  Negative except as documented in history of present illness.    Endocrine:  No excessive thirst, No polyuria.    Immunologic:  No recurrent fevers.    Musculoskeletal:  Neck pain, No joint pain, No muscle pain.    Neurologic:  Alert and oriented X4, Confusion, Numbness, No tingling, No headache.    Psychiatric:  No depression.       Health Status   Allergies:    Allergic Reactions (Selected)  Severity Not Documented  Ampicillin (Rash)  Simvastatin (Myalgias)   Medications:  (Selected)   Prescriptions  Prescribed  1ST TIER UNIFINE PE 32G X 4MM MISC 32 G X 4MM: 1ST TIER UNIFINE PE 32G X 4MM MISC 32 G X 4MM, See Instructions, Instructions: Use to inject as needed., Supply, # 100 EA, 11 Refill(s), Type: Maintenance, Pharmacy: [facility], keep on file and pt will notify when needed, Use to...  Auto Titrating CPAP 6-16 for daily use: Auto Titrating CPAP 6-16 for daily use,  See Instructions, Instructions: Heated humidifier x1; Humidifier chamber x1;  Heated tubing x1; Full face mask of choice with headgear  x1; Cushion x 1;  Filters: Disposable x1pk & Reusable x1pk.  Length of Need...  Cris Contour Next test strips: Cris Contour Next test strips, See Instructions, Instructions: testing 4-8 x/ day, Supply, # 800 EA, 3 Refill(s), Type: Maintenance, Pharmacy: [facility], keep on hold and pt will notify when needed, testing 4-8 x/ day, 65, in,...  Crestor 10 mg oral tablet: = 1 tab(s) ( 10 mg ), po, hs, # 90 tab(s), 3 Refill(s), Type: Maintenance, Pharmacy: [facility], keep on file and pt will notify when needed, 1 tab(s) Oral hs, 65, in, 10/28/21 17:54:00 CDT, Height Measured, 182.5, lb, 10/28/21 1...  NovoLOG 100 units/mL injectable solution: See Instructions, Instructions: 75 units via pump daily, # 90 mL, 1 Refill(s), Type: Maintenance, Pharmacy: [facility], keep on file, 75 units via pump daily, 65, in, 08/21/20 16:41:00 CDT, Height Measured, Weight Measured  aspirin 81 mg oral tablet: 1 tab(s) ( 81 mg ), PO, Daily, # 90 tab(s), 3 Refill(s), Type: Maintenance, Pharmacy: [facility], 1 tab(s) po daily  lisinopril 20 mg oral tablet: = 1 tab(s) ( 20 mg ), Oral, daily, # 90 tab(s), 3 Refill(s), Type: Maintenance, Pharmacy: [facility], 1 tab(s) Oral daily, 65, in, 10/28/21 17:54:00 CDT, Height Measured, 182.5, lb, 10/28/21 17:54:00 CDT, Weight Measured  one touch delica lancets: one touch delica lancets, See Instructions, Instructions: change needle daily-testing up to 8 times daily, Supply, # 400 EA, 3 Refill(s), Type: Maintenance, Pharmacy: [facility], keep on hold and pt will notify when needed, baires...  Documented Medications  Documented  ProstaScint 0.5 mg/mL intravenous solution: 0 Refill(s), Type: Maintenance,    Medications          *denotes recorded medication           Cris Contour Next test strips: See Instructions, testing 4-8 x/ day, 800 EA, 3 Refill(s).          Auto Titrating CPAP 6-16 for daily use: See Instructions, Heated humidifier x1; Humidifier chamber x1;  Heated tubing x1; Full face mask of choice with headgear  x1; Cushion x 1;  Filters: Disposable x1pk & Reusable x1pk.  Length of Need = 99 Months, 1 EA, 11 Refill(s).          1ST TIER UNIFINE PE 32G X 4MM MISC 32 G X 4MM: See Instructions, Use to inject as needed., 100 EA, 11 Refill(s).          one touch delica lancets: See Instructions, change needle daily-testing up to 8 times daily, 400 EA, 3 Refill(s).          aspirin 81 mg oral tablet: 81 mg, 1 tab(s), PO, Daily, 90 tab(s).          *ProstaScint 0.5 mg/mL intravenous solution: 0 Refill(s).          NovoLOG 100 units/mL injectable solution: See Instructions, 75 units via pump daily, 90 mL, 1 Refill(s).          lisinopril 20 mg oral tablet: 20 mg, 1 tab(s), Oral, daily, 90 tab(s), 3 Refill(s).          Crestor 10 mg oral tablet: 10 mg, 1 tab(s), po, hs, 90 tab(s), 3 Refill(s).       Problem list:    All Problems  Diabetes Type I / ICD-9-.01 / Confirmed  DM neuropathy, type I diabetes mellitus / SNOMED CT 759272692 / Confirmed  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 / Confirmed  Dyslipidemia / ICD-9-.4 / Confirmed  Erectile Dysfunction / ICD-9-.84 / Confirmed  Obstructive sleep apnea syndrome, severe / SNOMED CT 519275825 / Confirmed  Tobacco Abuse-Unspec / ICD-9-.1 / Confirmed  Inactive: GERD (Gastroesophageal Reflux Disease) / ICD-9-.81  Inactive: history of diabeteic ketoacidosis  Resolved: Hospitalized for Diabetes being out of control  Resolved: Retinopathy due to Secondary Diabetes / ICD-9-.50  Canceled: Dyslipidemia / ICD-9-.4  Canceled: Diabetes mellitus type I / SNOMED CT 015007565      Histories   Past Medical History:    Active  Diabetes Type I (250.01): Onset in the month of 9/1985 at 9 years  Tobacco  Abuse-Unspec (305.1)  Resolved  Hospitalized for Diabetes being out of control: Onset in 1991 at 15 years.  Resolved.  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  Comments:  1/13/2014 CST 3:55 PM CST - Nelly Acuña MD  Status post laser therapy (Dr. Sherif Charles)   Family History:    Sleep apnea syndrome  Father  Sister (Tere)  Sister (Melissa)  Psoriasis  Father  Heart attack  Grandfather (P)  Hypercholesterolemia  Father  Seizure  Sister (Melissa)  Sister (Tere)  Angioplasty  Father  Uncle (P)  MI - Myocardial infarction  Uncle (P)  CAD - Coronary artery disease  Grandfather (P)  Father  Uncle (P)     Procedure history:    Colonoscopy ([id number]) in the month of 1/2015 at 39 Years.  Comments:  7/1/2015 10:07 AM CDT - Ebonie Anderson CMA  Colon, descending, polyp:  1.  Tubular adenoma  2.  No evidence of high grade dsyplasia  3.  Polyp size:5mm(resection/retrievel - complete)  - Repeat in 5yrs   Social History:        Electronic Cigarette/Vaping Assessment            Electronic Cigarette Use: Never.      Alcohol Assessment            Current, Beer (12 oz), 1-2 times per week, 2 drinks/episode average.  3 drinks/episode maximum.      Tobacco Assessment: Current            Current, Cigarettes, 10 per day.            10 or more cigarettes (1/2 pack or more)/day in last 30 days      Employment and Education Assessment            Employed, Work/School description: [employer].        Physical Examination   vital signs stable, as noted above   VS/Measurements   General:  Alert and oriented, No acute distress.    Eye:  Pupils are equal, round and reactive to light, Extraocular movements are intact.    HENT:  Normocephalic.    Respiratory:  Lungs are clear to auscultation, Respirations are non-labored.    Cardiovascular:  Normal rate, Regular rhythm, No murmur, No edema.    Gastrointestinal:  Soft, Non-tender, Non-distended, Normal bowel sounds, wearing insulin pump.    Neurologic:  Alert, Oriented, Normal motor  function, No focal deficits.    Cognition and Speech:  Oriented, Speech clear and coherent, Functional cognition intact.    Psychiatric:  Appropriate mood & affect.       Review / Management   Results review:  Lab results   10/15/2021 2:31 PM CDT Urine Color Urine Dipstick Yellow    Urine Appearance Urine Dipstick Clear    pH Urine Dipstick 5.5    Specific Gravity Urine Dipstick 1.015    Glucose Urine Dipstick Greater than or equal to 1000    Bilirubin Urine Dipstick Negative    Ketones Urine Dipstick Negative    Blood Urine Dipstick Negative    Protein Urine Dipstick Negative    Nitrite Urine Dipstick Negative    Leukocyte Esterase Urine Dipstick Negative    Urobilinogen Urine Dipstick 0.2 mg/dl    POC Test Comments Performed at [facility]   10/15/2021 1:50 PM CDT Chlam/GC Comments See comment    Chlamydia RNA NOT DETECTED    Neisseria gonorrhoeae RNA NOT DETECTED    Urine Culture See comment   .       Impression and Plan   Diagnosis     Tobacco Abuse-Unspec (GPR11-JS Z72.0).     Uncontrolled type I diabetes mellitus with proliferative retinopathy (OQT31-EY E10.3599).     Erectile dysfunction (FBF21-IR N52.9).     Obstructive sleep apnea (SKH03-PS G47.33).       .) left sided orchitis/epidydimitis  - seen in urgent care earlier this month  - completing course of cipro  - still with some residual symptoms   - encouraged NSAIDs trial  - could consider PT/pelvic floor rehab if persists    .) type I Dm, uncontrolled  hypoglycemic medications: none  insulin therapy: Medtronic insulin pump - has CGMS though not using   - ICR 1:6.5   - basal rate: 0.975-1.05   - sensitivity 30mg/dL   - working with Sarah with plans on upgrading pump  last HbA1c: 9.2%   microvascular complications: proliferative retinopathy, + DOROTHY, neuropathy   - doesn't think neuropathic pain needing medication at this time  macrovascular complications: none known  ASA/AIDEN/statin:  ASA 81mg daily, lisinopril 10mg daily, rosuvastatin 10mg  daily (reinforced need to take oral medications)   - smoking cessation counseling    .) erectile dysfunction - almost certainly related to diabetic neuropathy/vascular disease   - poor response to Viagra and Cialis   - working with Urology; seeing a lot of success with penile injections    .) DAYANA, on CPAP  - seeing benefit with nightly use    .) health maintenance  - previous colonoscopy in 2015; h/o tubular adenoma; overdue for follow-up  - updating adult vaccinations; declines COVID vaccination though still considering it    RTC in 4 months

## 2022-02-16 NOTE — TELEPHONE ENCOUNTER
---------------------  From: Macie Barone   To: Frances HANEY, Aubrey; Nelson HANEY, Tommy; Phone Messages Pool ([id number]_[place] [facility] [place]);     Sent: 10/5/2019 12:34:15 PM CDT  Subject: Results Follow Up: Low Glucose Level     Please review lab results and advise if okay to wait for [facility] to review on 10/7/19.  Reporting per protocol for Glucose of less than 60.  Uses Novolog via insulin pump. Known Type 1 diabetes.  Previous glucose was 146 on 9/28/18 and most recent A1c on 7/11/19 was 7.8        Results:  Date Result Name Ind Value Ref Range   10/3/2019 3:46 PM Sodium Level  138 mmol/L (135 - 146)   10/3/2019 3:46 PM Potassium Level  3.9 mmol/L (3.5 - 5.3)   10/3/2019 3:46 PM Chloride Level  103 mmol/L (98 - 110)   10/3/2019 3:46 PM CO2 Level  26 mmol/L (20 - 32)   10/3/2019 3:46 PM Glucose Level ((L)) 44 mg/dL (65 - 99)   10/3/2019 3:46 PM BUN  18 mg/dL (7 - 25)   10/3/2019 3:46 PM Creatinine Level  1.06 mg/dL (0.60 - 1.35)   10/3/2019 3:46 PM BUN/Creat Ratio  NOT APPLICABLE (6 - 22)   10/3/2019 3:46 PM eGFR  84 mL/min/1.73m2 (> OR = 60 - )   10/3/2019 3:46 PM eGFR African American  98 mL/min/1.73m2 (> OR = 60 - )   10/3/2019 3:46 PM Calcium Level  10.1 mg/dL (8.6 - 10.3)   10/3/2019 3:46 PM U Microalbumin  19.0 mg/dL (See Note: - )   10/3/2019 3:46 PM Ur Creatinine  117 mg/dL (20 - 320)   10/3/2019 3:46 PM Ur Microalbumin/Creatinine Ratio ((H)) 162 ( - <30)---------------------  From: Aubrey Daniels MD   To: Macie Barone;     Sent: 10/5/2019 1:08:27 PM CDT  Subject: RE: Results Follow Up: Low Glucose Level     This can wait.  This would have been rechecked by patient by now monitoring his DM.

## 2022-02-16 NOTE — NURSING NOTE
Comprehensive Intake Entered On:  10/3/2019 3:09 PM CDT    Performed On:  10/3/2019 3:01 PM CDT by Beata Serrato               Summary   Chief Complaint :   Pt here for DM check.    Weight Measured :   183.0 lb(Converted to: 183 lb 0 oz, 83.01 kg)    Height Measured :   65 in(Converted to: 5 ft 5 in, 165.10 cm)    Body Mass Index :   30.45 kg/m2 (HI)    Body Surface Area :   1.95 m2   Systolic Blood Pressure :   136 mmHg (HI)    Diastolic Blood Pressure :   74 mmHg   Mean Arterial Pressure :   95 mmHg   Peripheral Pulse Rate :   88 bpm   BP Site :   Right arm   Pulse Site :   Radial artery   BP Method :   Manual   HR Method :   Manual   Temperature Tympanic :   97.8 DegF(Converted to: 36.6 DegC)  (LOW)    Beata Serrato - 10/3/2019 3:01 PM CDT   Health Status   Allergies Verified? :   Yes   Medication History Verified? :   Yes   Immunizations Current :   Yes   Medical History Verified? :   Yes   Pre-Visit Planning Status :   Completed   Tobacco Use? :   Current some day smoker   Tobacco Cessation Review :   Not ready to quit   Beata Serrato - 10/3/2019 3:01 PM CDT   Consents   Consent for Immunization Exchange :   Consent Granted   Consent for Immunizations to Providers :   Consent Granted   Beata Serrato - 10/3/2019 3:01 PM CDT   Meds / Allergies   (As Of: 10/3/2019 3:09:16 PM CDT)   Allergies (Active)   ampicillin  Estimated Onset Date:   Unspecified ; Reactions:   Rash ; Created By:   Malika Wilson; Reaction Status:   Active ; Category:   Drug ; Substance:   ampicillin ; Type:   Allergy ; Updated By:   Malika Wilson; Reviewed Date:   10/3/2019 3:08 PM CDT      simvastatin  Estimated Onset Date:   Unspecified ; Reactions:   myalgias ; Created By:   Srinivas Brown MD; Reaction Status:   Active ; Category:   Drug ; Substance:   simvastatin ; Type:   Allergy ; Updated By:   Srinivas Brown MD; Reviewed Date:   10/3/2019 3:08 PM CDT        Medication List   (As Of: 10/3/2019 3:09:16 PM CDT)    Prescription/Discharge Order    predniSONE  :   predniSONE ; Status:   Processing ; Ordered As Mnemonic:   predniSONE 20 mg oral tablet ; Ordering Provider:   Samir Tejeda MD; Action Display:   Complete ; Catalog Code:   predniSONE ; Order Dt/Tm:   10/3/2019 3:09:05 PM          Miscellaneous Rx Supply  :   Miscellaneous Rx Supply ; Status:   Prescribed ; Ordered As Mnemonic:   1ST TIER UNIFINE PE 32G X 4MM MISC 32 G X 4MM ; Simple Display Line:   See Instructions, Use to inject as needed., 100 EA, 11 Refill(s) ; Ordering Provider:   Tommy Damon MD; Catalog Code:   Miscellaneous Rx Supply ; Order Dt/Tm:   10/5/2018 11:39:13 AM          rosuvastatin  :   rosuvastatin ; Status:   Prescribed ; Ordered As Mnemonic:   Crestor 10 mg oral tablet ; Simple Display Line:   10 mg, 1 tab(s), po, hs, 90 tab(s), 3 Refill(s) ; Ordering Provider:   Tommy Damon MD; Catalog Code:   rosuvastatin ; Order Dt/Tm:   10/5/2018 11:39:11 AM          Miscellaneous Rx Supply  :   Miscellaneous Rx Supply ; Status:   Prescribed ; Ordered As Mnemonic:   one touch delica lancets ; Simple Display Line:   See Instructions, change needle daily, 3 box(es), 3 Refill(s) ; Ordering Provider:   Tommy Damon MD; Catalog Code:   Miscellaneous Rx Supply ; Order Dt/Tm:   10/5/2018 11:39:09 AM          insulin aspart  :   insulin aspart ; Status:   Prescribed ; Ordered As Mnemonic:   NovoLOG 100 units/mL injectable solution ; Simple Display Line:   See Instructions, 75 units via pump daily, 70 mL, 3 Refill(s) ; Ordering Provider:   Tommy Damon MD; Catalog Code:   insulin aspart ; Order Dt/Tm:   10/5/2018 11:39:07 AM          lisinopril  :   lisinopril ; Status:   Prescribed ; Ordered As Mnemonic:   lisinopril 10 mg oral tablet ; Simple Display Line:   10 mg, 1 tab(s), PO, Daily, 90 tab(s), 3 Refill(s) ; Ordering Provider:   Tommy Damon MD; Catalog Code:   lisinopril ; Order Dt/Tm:   10/5/2018 11:12:30 AM          Miscellaneous Prescription  :    Miscellaneous Prescription ; Status:   Prescribed ; Ordered As Mnemonic:   Cris Contour Next test strips ; Simple Display Line:   See Instructions, testing 4-8 x/ day, 800 EA, 3 Refill(s) ; Ordering Provider:   Tommy Damon MD; Catalog Code:   Miscellaneous Prescription ; Order Dt/Tm:   2/15/2018 7:48:58 PM          aspirin  :   aspirin ; Status:   Prescribed ; Ordered As Mnemonic:   aspirin 81 mg oral tablet ; Simple Display Line:   81 mg, 1 tab(s), PO, Daily, 90 tab(s) ; Ordering Provider:   Nelly Acuña MD; Catalog Code:   aspirin ; Order Dt/Tm:   1/10/2014 2:24:22 PM            Home Meds    sildenafil  :   sildenafil ; Status:   Documented ; Ordered As Mnemonic:   Viagra 100 mg oral tablet ; Simple Display Line:   See Instructions, 1 tab(s)   1 hour before sexual activity, 6 tab(s), 0 Refill(s) ; Catalog Code:   sildenafil ; Order Dt/Tm:   9/25/2017 12:11:22 PM          ibuprofen  :   ibuprofen ; Status:   Processing ; Ordered As Mnemonic:   ibuprofen ; Action Display:   Complete ; Catalog Code:   ibuprofen ; Order Dt/Tm:   10/3/2019 3:09:05 PM            Social History   Social History   (As Of: 10/3/2019 3:09:16 PM CDT)   Alcohol:        Current, Beer (12 oz), 1-2 times per week, 2 drinks/episode average.  3 drinks/episode maximum.   (Last Updated: 12/16/2016 10:29:17 AM CST by Ebonie Anderson CMA)          Tobacco:  Current      Current, Cigarettes, 10 per day.   (Last Updated: 6/9/2010 3:17:18 PM CDT by Yani Santana CMA)          Employment/School:        Employed, Work/School description: [employer].   (Last Updated: 8/31/2010 3:21:41 PM CDT by Srinivas Brown MD)

## 2022-02-16 NOTE — NURSING NOTE
Comprehensive Intake Entered On:  12/2/2021 5:29 PM CST    Performed On:  12/2/2021 5:26 PM CST by Ebonie Anderson CMA               Summary   Chief Complaint :   f/u chronic    Weight Measured :   188.7 lb(Converted to: 188 lb 11 oz, 85.593 kg)    Height Measured :   65 in(Converted to: 5 ft 5 in, 165.10 cm)    Body Mass Index :   31.4 kg/m2 (HI)    Body Surface Area :   1.98 m2   Systolic Blood Pressure :   135 mmHg (HI)    Diastolic Blood Pressure :   80 mmHg   Mean Arterial Pressure :   98 mmHg   Peripheral Pulse Rate :   77 bpm   Temperature Tympanic :   97.6 DegF(Converted to: 36.4 DegC)  (LOW)    Oxygen Saturation :   98 %   Ebonie Anderson CMA - 12/2/2021 5:26 PM CST   Health Status   Allergies Verified? :   Yes   Medication History Verified? :   Yes   Immunizations Current :   Yes   Medical History Verified? :   Yes   Pre-Visit Planning Status :   N/A   Ebonie Anderson CMA - 12/2/2021 5:26 PM CST   Consents   Consent for Immunization Exchange :   Consent Granted   Consent for Immunizations to Providers :   Consent Granted   Ebonie Anderson CMA - 12/2/2021 5:26 PM CST   Social History   Social History   (As Of: 12/2/2021 5:29:18 PM CST)   Alcohol:        Current, Beer (12 oz), 1-2 times per week, 2 drinks/episode average.  3 drinks/episode maximum.   (Last Updated: 12/16/2016 10:29:17 AM CST by Ebonie Anderson CMA)          Tobacco:  Current      Current, Cigarettes, 10 per day.   (Last Updated: 6/9/2010 3:17:18 PM CDT by Yani Santana CMA)   10 or more cigarettes (1/2 pack or more)/day in last 30 days   (Last Updated: 12/15/2020 10:37:33 AM CST by Priya Yang CMA)          Electronic Cigarette/Vaping:        Electronic Cigarette Use: Never.   (Last Updated: 12/15/2020 10:37:33 AM CST by Priya Yang CMA)          Employment/School:        Employed, Work/School description: [employer].   (Last Updated: 8/31/2010 3:21:41 PM CDT by Srinivas Brown MD)

## 2022-02-16 NOTE — TELEPHONE ENCOUNTER
---------------------  From: Rosina West LPN (Phone Messages Pool ([id number]_[facility]))   To: [facility] Message Pool ([id number]_[facility]);     Sent: 10/15/2021 2:28:05 PM CDT  Subject: General Message     Phone Message    PCP:   NAHUN asked for [facility]      Time of Call:  2:20pm       Person Calling:  Emmanuel- Daniel  Phone number:  _    Returned call at: _    Note:   Bill calling stating he was suppose to call [facility] with US results. Emmanuel says he was told nobody could get ahold of [facility]/assistant. Tried again- told pt they must be with another patient.    Emmanuel says US should be read by radiologist soon and available to view.    Emmanuel says he has to leave for an emergency in [place] immediately and needs to know what to do with patient because he was told to wait. Emmanuel asks if [facility] call pt on cell with what to do.    Verbal message also given to Helena.    Last office visit and reason:  10/15/21---------------------  From: Ca Salmeron LPN ([facility] Message Pool ([id number]_[facility]))   To: Dimple Hurtado MD;     Sent: 10/15/2021 2:52:25 PM CDT  Subject: FW: General Messagegot report.  talked to patient

## 2022-02-16 NOTE — TELEPHONE ENCOUNTER
CODIE MELCHOR @ PHONE: [phone] FAX: [phone]. INCLUDED ORDER, DEMOGRAPHICS, AND INSURANCE.

## 2022-02-16 NOTE — TELEPHONE ENCOUNTER
Entered by Ebonie Anderson CMA on March 28, 2019 12:11:08 PM CDT  LM for return call at 1210, ? able to return for A1c test   Per lab req - specimen not found for testing...          ---------------------  From: Tommy Damon MD   To: [facility] [place] Pool ([id number]_[place] [facility] [place]);     Sent: 3/27/2019 1:58:27 PM CDT  Subject: General Message     Can you reach out to Taye about recent testing.  For some reason his A1c was not processed.  Would recommend we repeat this sometime in the near future.pt returned call [id number] and LM ok to leave VM  Called pt back at 1239 and he said he will c/b to schedule for lab test  ** Submitted: **  Order:Return to Clinic (Request)  Details:  RFV: HgbA1c only - error with speciman last wk per lab, Return in soon         Signed by Ebonie Anderson CMA  3/28/2019 12:40:00 PM

## 2022-02-16 NOTE — PROGRESS NOTES
Patient:   LIDDLE, CHAD A            MRN: [id number]            FIN: [id number]               Age:   45 years     Sex:  Male     :  1975   Associated Diagnoses:   Encounter for patient concern about exposure to infectious organism   Author:   Karl King PA-C      Visit Information      Date of Service: 10/28/2020 11:46 am  Performing Location: [facility]  Encounter#: [id number]      Primary Care Provider (PCP):  Tommy Damon MD    NPI# [id number]      Referring Provider:  Karl King PA-C    NPI# [id number]   Visit type:  Video Visit via [employer] or [employer].    Participants in room during visit:  2_   Location of patient:  Parked vehicle in [place]  Location of provider:  _ (Clinic office )  Video Start Time:  1225  Video End Time:   8    Today's visit was conducted via video conference due to the COVID-19 pandemic.  The patient's consent to proceed with a video visit has been obtained and documented.      Chief Complaint   Covid exposure      History of Present Illness   Patient is a 45 year old M who is being evaluated via a billable video visit. covid exposure with friend. Worked on [employer] with him on Friday, then played cards with him on Saturday. Patient has no other known exposure and is asymptomatic. Glucose readings have been stable.      Review of Systems   Constitutional:  Negative.    Eye:  Negative.    Ear/Nose/Mouth/Throat:  Negative.    Respiratory:  Negative.    Cardiovascular:  Negative.    Gastrointestinal:  Negative.    Genitourinary:  Negative.    Immunologic:  Negative.    Musculoskeletal:  Negative.    Integumentary:  Negative.    Neurologic:  Negative.    Psychiatric:  Negative.       Health Status   Allergies:    Allergic Reactions (Selected)  Severity Not Documented  Ampicillin (Rash)  Simvastatin (Myalgias)   Medications:  (Selected)   Prescriptions  Prescribed  1ST TIER UNIFINE PE 32G X 4MM MISC 32 G X 4MM: 1ST TIER UNIFINE PE 32G X 4MM MISC 32 G X 4MM, See  Instructions, Instructions: Use to inject as needed., Supply, # 100 EA, 11 Refill(s), Type: Maintenance, Pharmacy: [facility], keep on file and pt will notify when needed, Use to...  Auto Titrating CPAP 6-16 for daily use: Auto Titrating CPAP 6-16 for daily use, See Instructions, Instructions: Heated humidifier x1; Humidifier chamber x1;  Heated tubing x1; Full face mask of choice with headgear  x1; Cushion x 1;  Filters: Disposable x1pk & Reusable x1pk.  Length of Need...  Cris Contour Next test strips: Cris Contour Next test strips, See Instructions, Instructions: testing 4-8 x/ day, Supply, # 800 EA, 3 Refill(s), Type: Maintenance, Pharmacy: [facility], keep on hold and pt will notify when needed, testing 4-8 x/ day, 65, in,...  Crestor 10 mg oral tablet: = 1 tab(s) ( 10 mg ), po, hs, # 90 tab(s), 3 Refill(s), Type: Maintenance, Pharmacy: [facility], keep on file and pt will notify when needed, 1 tab(s) Oral hs, 65, in, 08/21/20 16:41:00 CDT, Height Measured, 171.8, lb, 08/21/20 1...  NovoLOG 100 units/mL injectable solution: See Instructions, Instructions: 75 units via pump daily, # 90 mL, 1 Refill(s), Type: Maintenance, Pharmacy: [facility], keep on file, 75 units via pump daily, 65, in, 08/21/20 16:41:00 CDT, Height Measured, Weight Measured  aspirin 81 mg oral tablet: 1 tab(s) ( 81 mg ), PO, Daily, # 90 tab(s), 3 Refill(s), Type: Maintenance, Pharmacy: [facility], 1 tab(s) po daily  one touch delica lancets: one touch delica lancets, See Instructions, Instructions: change needle daily-testing up to 8 times daily, Supply, # 400 EA, 3 Refill(s), Type: Maintenance, Pharmacy: [facility], keep on hold and pt will notify when needed, baires...  Documented Medications  Documented  ProstaScint 0.5 mg/mL intravenous solution: 0 Refill(s), Type: Maintenance  lisinopril 10 mg oral tablet: = 1 tab(s) ( 10 mg  ), Oral, daily, 0 Refill(s), Type: Maintenance   Problem list:    All Problems  Tobacco Abuse-Unspec / ICD-9-.1 / Confirmed  Obstructive sleep apnea syndrome, severe / SNOMED CT 775015113 / Confirmed  Erectile Dysfunction / ICD-9-.84 / Confirmed  Dyslipidemia / ICD-9-.4 / Confirmed  DM neuropathy, type I diabetes mellitus / SNOMED CT 166669401 / Confirmed  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 / Confirmed  Diabetes Type I / ICD-9-.01 / Confirmed      Histories   Past Medical History:    Active  Diabetes Type I (250.01): Onset in the month of 9/1985 at 9 years  Tobacco Abuse-Unspec (305.1)  Resolved  Hospitalized for Diabetes being out of control: Onset in 1991 at 15 years.  Resolved.  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  Comments:  1/13/2014 CST 3:55 PM CST - Arianne HANEY, Nelly  Status post laser therapy (Dr. Sherif Charles)   Family History:    Sleep apnea syndrome  Father  Sister (Tere)  Sister (Melissa)  Psoriasis  Father  Heart attack  Grandfather (P)  Hypercholesterolemia  Father  Seizure  Sister (Melissa)  Sister (Tere)  Angioplasty  Father  Uncle (P)  MI - Myocardial infarction  Uncle (P)  CAD - Coronary artery disease  Grandfather (P)  Father  Uncle (P)     Procedure history:    Colonoscopy ([id number]) in the month of 1/2015 at 39 Years.  Comments:  7/1/2015 10:07 AM CDT - Monica CMA, Ebonie  Colon, descending, polyp:  1.  Tubular adenoma  2.  No evidence of high grade dsyplasia  3.  Polyp size:5mm(resection/retrievel - complete)  - Repeat in 5yrs   Social History:        Alcohol Assessment            Current, Beer (12 oz), 1-2 times per week, 2 drinks/episode average.  3 drinks/episode maximum.      Tobacco Assessment: Current            Current, Cigarettes, 10 per day.      Employment and Education Assessment            Employed, Work/School description: [employer].        Physical Examination   General:  Alert and oriented, No acute distress.    Eye:  Pupils are  equal, round and reactive to light, Normal conjunctiva.    HENT:  Oral mucosa is moist.    Neck:  Supple.    Respiratory:  Respirations are non-labored.    Psychiatric:  Cooperative, Appropriate mood & affect, Normal judgment.       Impression and Plan   Diagnosis     Encounter for patient concern about exposure to infectious organism (TVP65-PG Z03.818).     Patient Instructions:       Counseled: Patient, Activity.    Summary:  set up for C-19 testing. Self quarantine. Return/call if symptoms develop, chest tightness, SOB etc. Tylenol. Fluids..    Orders     Orders   Lab (Gen Lab  Reference Lab):  SARS-CoV-2 RNA (COVID-19), Qualitative NAAT* ([facility]) (Order): Specimen Type: Anterior Nares, Collection Date: 10/28/2020 12:30 PM CDT  Charges (Evaluation and Management):  61796 office outpatient visit 15 minutes (Charge) (Order): Quantity: 1, Encounter for patient concern about exposure to infectious organism.        Health Maintenance      Recommendations     Pending (in the next year)        OverDue           DM - Foot Exam due  05/17/14  and every 1  year(s)           Aspirin Therapy for Prevention of CVD (Male) due  01/10/19  and every 5  year(s)           DM - Eye Exam due  03/14/20  and every 1  year(s)           Alcohol Misuse Screen (Male) due  03/14/20  and every 1  year(s)           Depression Screen (Male) due  03/14/20  and every 1  year(s)           Influenza Vaccine due  08/31/20  and every 1  year(s)        Due            DM - Microalbumin due  10/03/20  and every 1  year(s)           Coronavirus (COVID-19) Suspected due  10/28/20  and every 1  day(s)           Exercise due  10/28/20  and every 1  year(s)           Preventative Services due  10/28/20  and every 5  year(s)        Near Due            DM - HgbA1c near due  11/17/20  and every 3  month(s)        Due In Future            Lipid Disorders Screen (Male) not due until  08/17/21  and every 1  year(s)           Type 2 Diabetes Mellitus Screen (Male)  not due until  08/17/21  and every 1  year(s)           Body Mass Index Check (Male) not due until  08/21/21  and every 1  year(s)           High Blood Pressure Screen (Male) not due until  08/21/21  and every 1  year(s)     Satisfied (in the past 1 year)        Satisfied            Body Mass Index Check (Male) on  08/21/20.           Body Mass Index Check (Male) on  01/08/20.           Body Mass Index Check (Male) on  11/26/19.           Body Mass Index Check (Male) on  11/08/19.           DM - HgbA1c on  08/17/20.           High Blood Pressure Screen (Male) on  08/21/20.           High Blood Pressure Screen (Male) on  01/08/20.           High Blood Pressure Screen (Male) on  11/26/19.           High Blood Pressure Screen (Male) on  11/08/19.           Lipid Disorders Screen (Male) on  08/17/20.           Lipid Disorders Screen (Male) on  08/17/20.           Lipid Disorders Screen (Male) on  08/17/20.           Lipid Disorders Screen (Male) on  08/17/20.           Type 2 Diabetes Mellitus Screen (Male) on  08/17/20.

## 2022-02-16 NOTE — LETTER
(Inserted Image. Unable to display)   April 22, 2019      CHAD LIDDLE  [address] [place], [place] [id number]        Dear QUITA,     Thank you for selecting [facility] (previously [facility]) for your healthcare needs. Below you will find the results of your recent test(s) done at our clinic.      A1c appearing stable; ideally would like to see this ~7% with pump/CGMS use.       Result Name Current Result Previous Result Reference Range   Hgb A1c ((H)) 8.3 4/15/2019  See comment 3/14/2019  - <5.7       Please contact me or my assistant at [phone] if you have any questions or concerns.     Sincerely,        Tommy Damon MD    What do your labs mean?  Below is a glossary of commonly ordered labs:  LDL - Bad Cholesterol  HDL - Good Cholesterol  AST/ALT - Liver Function  Cr/Creatinine - Kidney Function  Microalbumin - Kidney Function  BUN - Kidney Function  PSA - Prostate   TSH - Thyroid Hormone  HgbA1c - Diabetes Test  Hgb (Hemoglobin) - Red Blood Cells

## 2022-02-16 NOTE — PROGRESS NOTES
Patient:   LIDDLE, CHAD A            MRN: [id number]            FIN: [id number]               Age:   46 years     Sex:  Male     :  1975   Associated Diagnoses:   None   Author:   Nelson HANEY, Tommy      Procedure   EKG procedure   Indication: chest pain.     Position: supine.     EKG findings   Interpretation: by primary care provider.     Rhythm: heart rate  67  beats/min, sinus normal.     Axis: normal axis, normal configuration.     Within normal limits.     Intervals: WA normal, QRS normal, QT normal.     Normal EKG.     P waves: normal.     QRS complex: normal, no Q waves present.     ST-T-U complex: normal.     Interpretation: no ST-T wave abnormalities.     Discussed: with patient.        Impression and Plan   Diagnosis     Normal EKG.     Orders

## 2022-02-16 NOTE — PROGRESS NOTES
Patient:   LIDDLE, CHAD A            MRN: [id number]            FIN: [id number]               Age:   46 years     Sex:  Male     :  1975   Associated Diagnoses:   None   Author:   Dimple Hurtado MD       -   Today's date:    10/15/2021.        -   To whom it may concern:        This patient Was seen in my office on  10/15/2021.  .     Please excuse him/ her from work.  He/ she may return to work, on  10/18/2021.      -   Best regards,   Dimple Hurtado MD

## 2022-02-16 NOTE — PROGRESS NOTES
Patient:   LIDDLE, CHAD A            MRN: [id number]            FIN: [id number]               Age:   41 years     Sex:  Male     :  1975   Associated Diagnoses:   Reactive airway disease; Diabetes Type I   Author:   Samir Tejeda MD      Visit Information      Date of Service: 2017 07:46 pm  Performing Location: [facility]  Encounter#: [id number]      Primary Care Provider (PCP):  Tommy Damon MD    NPI# [id number]      Referring Provider:  No referring provider recorded for selected visit.      Chief Complaint   2017 7:48 PM CST     Worsening pneumonia, trouble breathing.        History of Present Illness   Became sick Thursday night. Started with cough, headaches and body aches. Seen and tested negative for influenza. Blood sugars have been 100 today.      Review of Systems   Low grade temperature 99 over the weekend.   Gastrointestinal:  Nausea, No vomiting.    Hematology/Lymphatics:  No bruising tendency, No bleeding tendency.    Integumentary:  No rash.       Health Status   Allergies:    Allergic Reactions (Selected)  Severity Not Documented  Ampicillin (Rash)  Simvastatin (Myalgias)   Medications:  (Selected)   Prescriptions  Prescribed  1ST TIER UNIFINE PE 32G X 4MM MISC 32 G X 4MM: 1ST TIER UNIFINE PE 32G X 4MM MISC 32 G X 4MM, See Instructions, Instructions: Use to inject as needed., Supply, # 100 EA, 11 Refill(s), Type: Maintenance, Pharmacy: [facility], keep on file and pt will notify when needed, Use to...  Cialis 20 mg oral tablet: 1 tab(s) ( 20 mg ), po, daily, # 30 tab(s), 1 Refill(s), Pharmacy: [facility], keep on file and pt will notify when needed, 1 tab(s) po daily  Crestor 10 mg oral tablet: 1 tab(s) ( 10 mg ), po, hs, # 90 tab(s), 3 Refill(s), Type: Maintenance, Pharmacy: [facility], 1 tab(s) po hs  Delica One Touch Ultra test Strips: Delica One Touch Ultra test Strips, See Instructions,  Instructions: uses 4-5 daily, Supply, # 400 EA, 3 Refill(s), Type: Maintenance, Pharmacy: [facility], keep on file and pt will notify when needed, uses 4-5 daily  Lantus Solostar Pen 100 units/mL subcutaneous solution: ( 28 unit(s) ), subcutaneous, hs, # 30 mL, 1 Refill(s), Pharmacy: [facility], keep on file and pt will notify when needed, 28 unit(s) subcutaneous hs  NovoLOG FlexPen 100 units/mL subcutaneous solution: See Instructions, Instructions: INJECT 8-10 units with meals, # 30 mL, 1 Refill(s), Pharmacy: [facility], keep on file and pt will notify when needed, INJECT 8-10 units with meals  ONE TOUCH ULTRA TEST STRIPS: ONE TOUCH ULTRA TEST STRIPS, See Instructions, Instructions: TESTING 4 TIMES DAILY, Supply, # 400 EA, 3 Refill(s), Type: Maintenance, Pharmacy: [facility], TESTING 4 TIMES DAILY  ONE TOUCH ULTRA TEST STRIPS: ONE TOUCH ULTRA TEST STRIPS, See Instructions, Instructions: TESTS 4 TIMES DAILY, Supply, # 50 EA, 1 Refill(s), Type: Maintenance, Pharmacy: [facility], TESTS 4 TIMES DAILY  aspirin 81 mg oral tablet: 1 tab(s) ( 81 mg ), PO, Daily, # 90 tab(s), 3 Refill(s), Type: Maintenance, Pharmacy: [facility], 1 tab(s) po daily  doxycycline monohydrate 100 mg oral capsule: 1 cap(s) ( 100 mg ), PO, bid, # 20 cap(s), 0 Refill(s), Type: Maintenance, Pharmacy: [facility], 1 cap(s) po bid,x10 day(s)  lisinopril 5 mg oral tablet: 1 tab(s) ( 5 mg ), PO, Daily, # 90 tab(s), 3 Refill(s), Type: Maintenance, Pharmacy: [facility], 1 tab(s) po daily  Documented Medications  Documented  ibuprofen: 0 Refill(s), Type: Maintenance   Problem list:    All Problems  Diabetes Type I / ICD-9-.01 / Confirmed  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 / Confirmed  Dyslipidemia / ICD-9-.4 / Confirmed  Erectile Dysfunction / ICD-9-.84 / Confirmed  Tobacco Abuse-Unspec / ICD-9-.1 /  Confirmed  Inactive: GERD (Gastroesophageal Reflux Disease) / ICD-9-.81  Inactive: history of diabeteic ketoacidosis  Resolved: Hospitalized for Diabetes being out of control  Resolved: Retinopathy due to Secondary Diabetes / ICD-9-.50  Canceled: Dyslipidemia / ICD-9-.4  Canceled: Diabetes mellitus type I / SNOMED CT 355860614      Histories   Procedure history:    Colonoscopy (SNOMED CT 085622153) performed by Dalton Lee in the month of 1/2015 at 39 Years.  Comments:  7/1/2015 10:07 AM - Tomasstlyle WING, Ebonie  Colon, descending, polyp:  1.  Tubular adenoma  2.  No evidence of high grade dsyplasia  3.  Polyp size:5mm(resection/retrievel - complete)  - Repeat in 5yrs   Social History:        Alcohol Assessment            Current, Beer (12 oz), 1-2 times per week, 2 drinks/episode average.  3 drinks/episode maximum.      Tobacco Assessment: Current            Current, Cigarettes, 10 per day.      Employment and Education Assessment            Employed, Work/School description: [employer].        Physical Examination   Vital Signs   2/6/2017 7:48 PM CST Temperature Tympanic 97.4 DegF  LOW    Peripheral Pulse Rate 86 bpm    Oxygen Saturation 98 %      General:  Alert and oriented, No acute distress.    HENT:  Tympanic membranes are clear.    Respiratory:  no crackles. Diffuse wheezing.    Cardiovascular:  Normal rate, Regular rhythm.    Musculoskeletal:  Normal gait.    Neurologic:  No focal deficits.    Psychiatric:  Appropriate mood & affect.    Coughing as coming into the exam room and quite a bit until received the neb treatment      Review / Management   Given an albuterol nebulizer and felt great improvement. No longer wheezing on exam or coughing      Impression and Plan   Diagnosis     Reactive airway disease (CSH74-KP J45.909).     Diabetes Type I (BDR60-DQ E10.9).     Prednisone 20mg daily and monitor blood sugars. Give albuterol inhaler. Follow up in 2 days and sooner if worse. Continue  doxycycline

## 2022-02-16 NOTE — PROGRESS NOTES
Patient:   LIDDLE, CHAD A            MRN: [id number]            FIN: [id number]               Age:   42 years     Sex:  Male     :  1975   Associated Diagnoses:   Diabetes Type I; Dyslipidemia   Author:   Sindhu Gannon      Visit Information   Visit type:  Diabetes Self Management Education .    Referral source:  Nelson HANEY, Tommy.       Chief Complaint   Uncontrolled DM Type I       History of Present Illness   Pt has had TID for 32 years.  He is wanting to move towards using an insulin pump.    Insulin: Lantus 28u - dose has not changed since last seen RD in , Novolog will guess at dosing from doing it so many years 30 - 40u/ day and will vary the dosing depending on what he is eating.  Pt does understand what foods are carbs but does not have an insulin to carb ratio.    Routine DM care: eye exams with [facility] -due and has had laser treatments related to retinopathy, feet is aware to be cautious and wears steel toe shoes at work and well insulated boots in the winter, dental exam due, skin no concerns.    Exercise: no set routine but tries not to be sedentary  Stress: mod/ high work/ life balance       Health Status   Allergies:    Allergic Reactions (Selected)  Severity Not Documented  Ampicillin (Rash)  Simvastatin (Myalgias)   Medications:  (Selected)   Prescriptions  Prescribed  1ST TIER UNIFINE PE 32G X 4MM MISC 32 G X 4MM: 1ST TIER UNIFINE PE 32G X 4MM MISC 32 G X 4MM, See Instructions, Instructions: Use to inject as needed., Supply, # 100 EA, 11 Refill(s), Type: Maintenance, Pharmacy: [id number], keep on file and pt will notify when needed, Use to inject as...  Atrovent 42 mcg/inh nasal spray: 2 spray(s), nasal, qid, PRN: for nasal congestion, # 1 EA, 2 Refill(s), Type: Maintenance, Pharmacy: [facility], 2 spray(s) nasal qid,PRN:for nasal congestion  Cialis 20 mg oral tablet: 1 tab(s) ( 20 mg ), po, daily, # 30 tab(s), 1 Refill(s), Pharmacy: [facility], keep on file  and pt will notify when needed, 1 tab(s) po daily  Crestor 10 mg oral tablet: 1 tab(s) ( 10 mg ), po, hs, # 90 tab(s), 3 Refill(s), Type: Maintenance, Pharmacy: [facility] #[id number], keep on file and pt will notify when needed, 1 tab(s) po hs  Delica One Touch Ultra test Strips: Delica One Touch Ultra test Strips, See Instructions, Instructions: uses 4-5 daily, Supply, # 400 EA, 3 Refill(s), Type: Maintenance, Pharmacy: [facility] #[id number], keep on file and pt will notify when needed, uses 4-5 daily  Lantus Solostar Pen 100 units/mL subcutaneous solution: ( 28 unit(s) ), subcutaneous, hs, x 30 day(s), # 30 mL, 3 Refill(s), Type: Physician Stop, Pharmacy: [facility], 28 unit(s) subcutaneous hs,x30 day(s)  NovoLOG FlexPen 100 units/mL subcutaneous solution: See Instructions, Instructions: INJECT 8-10 units with meals, # 30 mL, 3 Refill(s), Pharmacy: [facility] #[id number], keep on file and pt will notify when needed, INJECT 8-10 units with meals  ONE TOUCH ULTRA TEST STRIPS: ONE TOUCH ULTRA TEST STRIPS, See Instructions, Instructions: TESTING 4 TIMES DAILY, Supply, # 400 EA, 3 Refill(s), Type: Maintenance, Pharmacy: [facility], TESTING 4 TIMES DAILY  ONE TOUCH ULTRA TEST STRIPS: ONE TOUCH ULTRA TEST STRIPS, See Instructions, Instructions: TESTS 4 TIMES DAILY, Supply, # 50 EA, 1 Refill(s), Type: Maintenance, Pharmacy: [facility], TESTS 4 TIMES DAILY  aspirin 81 mg oral tablet: 1 tab(s) ( 81 mg ), PO, Daily, # 90 tab(s), 3 Refill(s), Type: Maintenance, Pharmacy: [facility], 1 tab(s) po daily  lisinopril 5 mg oral tablet: 1 tab(s) ( 5 mg ), PO, Daily, # 90 tab(s), 3 Refill(s), Type: Maintenance, Pharmacy: [facility] #[id number], keep on file and pt will notify when needed, 1 tab(s) po daily  Documented Medications  Documented  Viagra 100 mg oral tablet: See Instructions, Instructions: 1 tab(s)   1 hour before sexual activity, # 6 tab(s), 0 Refill(s), Type: Maintenance, Written Rx per   Prall  ibuprofen: 0 Refill(s), Type: Maintenance   Problem list:    All Problems  Diabetes Type I / ICD-9-.01 / Confirmed  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 / Confirmed  Dyslipidemia / ICD-9-.4 / Confirmed  Erectile Dysfunction / ICD-9-.84 / Confirmed  Tobacco Abuse-Unspec / ICD-9-.1 / Confirmed  Inactive: GERD (Gastroesophageal Reflux Disease) / ICD-9-.81  Inactive: history of diabeteic ketoacidosis  Resolved: Hospitalized for Diabetes being out of control  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  Resolved.  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  Comments:  1/13/2014 CST 3:55 PM CST - Nelly Acuña MD  Status post laser therapy (Dr. Sherif Charles)   Family History:    Psoriasis  Father  Heart attack  Grandfather (P)  Hypercholesterolemia  Father  Seizure  Sister (Melissa)  Sister (Tere)  Angioplasty  Father  Uncle (P)  MI - Myocardial infarction  Uncle (P)  CAD - Coronary artery disease  Grandfather (P)  Father  Uncle (P)     Procedure history:    Colonoscopy (SNOMED CT 956885905) performed by Dalton Lee in the month of 1/2015 at 39 Years.  Comments:  7/1/2015 10:07 AM - Ebonie Anderson CMA  Colon, descending, polyp:  1.  Tubular adenoma  2.  No evidence of high grade dsyplasia  3.  Polyp size:5mm(resection/retrievel - complete)  - Repeat in 5yrs   Social History:        Alcohol Assessment            Current, Beer (12 oz), 1-2 times per week, 2 drinks/episode average.  3 drinks/episode maximum.      Tobacco Assessment: Current            Current, Cigarettes, 10 per day.      Employment and Education Assessment             Employed, Work/School description: [employer].        Physical Examination   Measurements from flowsheet : Measurements   9/29/2017 3:48 PM CDT Height Measured - Standard 65 in    Weight Measured - Standard 174.74 lb    BSA 1.9 m2    Body Mass Index 29.08 kg/m2         Health Maintenance      Recommendations     Pending (in the next year)        OverDue           DM - Foot Exam due  05/17/14  and every 1  year(s)           DM - Eye Exam due  12/20/14  and every 1  year(s)        Due            Exercise due  09/29/17  and every 1  year(s)           Preventative Services due  09/29/17  and every 5  year(s)        Near Due            DM - HgbA1c near due  11/24/17  and every 3  month(s)        Due In Future            Lipid Disorders Screen (Male) not due until  12/16/17  and every 1  year(s)           Alcohol Misuse Screen (Male) not due until  12/16/17  and every 1  year(s)           Depression Screen (Male) not due until  12/16/17  and every 1  year(s)           Tetanus Vaccine not due until  02/25/18  and every 10  year(s)           Influenza Vaccine not due until  08/24/18  and every 1  year(s)           DM - Microalbumin not due until  08/24/18  and every 1  year(s)           Type 2 Diabetes Mellitus Screen (Male) not due until  08/24/18  and every 1  year(s)           High Blood Pressure Screen (Male) not due until  08/28/18  and every 1  year(s)     Satisfied (in the past 1 year)        Satisfied            Alcohol Misuse Screen (Male) on  12/16/16.           Body Mass Index Check (Male) on  09/29/17.           Body Mass Index Check (Male) on  08/28/17.           Body Mass Index Check (Male) on  02/08/17.           Body Mass Index Check (Male) on  02/04/17.           Body Mass Index Check (Male) on  11/28/16.           DM - HgbA1c on  08/24/17.           DM - HgbA1c on  12/16/16.           DM - Microalbumin on  08/24/17.           DM - Microalbumin on  08/24/17.           Depression Screen (Male) on   12/16/16.           Depression Screen (Male) on  12/16/16.           Depression Screen (Male) on  12/16/16.           High Blood Pressure Screen (Male) on  08/28/17.           High Blood Pressure Screen (Male) on  08/24/17.           High Blood Pressure Screen (Male) on  02/08/17.           High Blood Pressure Screen (Male) on  02/06/17.           High Blood Pressure Screen (Male) on  02/04/17.           High Blood Pressure Screen (Male) on  12/16/16.           High Blood Pressure Screen (Male) on  11/28/16.           Influenza Vaccine on  08/24/17.           Influenza Vaccine on  12/16/16.           Lipid Disorders Screen (Male) on  12/16/16.           Lipid Disorders Screen (Male) on  12/16/16.           Lipid Disorders Screen (Male) on  12/16/16.           Lipid Disorders Screen (Male) on  12/16/16.           Type 2 Diabetes Mellitus Screen (Male) on  08/24/17.           Type 2 Diabetes Mellitus Screen (Male) on  12/16/16.        Review / Management   Results review:  Lab results   8/28/2017 11:17 AM CDT Culture Strep A See comment   8/28/2017 10:53 AM CDT Group A Strep POC NOT DETECTED   8/24/2017 5:27 PM CDT Sodium Level 134 mmol/L  LOW    Potassium Level 4.0 mmol/L    Chloride Level 96 mmol/L  LOW    CO2 Level 30 mmol/L    Glucose Level 375 mg/dL  HI    BUN 20 mg/dL    Creatinine 1.21 mg/dL    BUN/Creat Ratio NOT APPLICABLE    eGFR 73 mL/min/1.73m2    eGFR African American 85 mL/min/1.73m2    Calcium Level 10.2 mg/dL    Hgb A1c 10.3  HI    U Microalbumin 1.2 mg/dL    Ur Creatinine 43 mg/dL    Ur Microalb/Creat Ratio 28   .       Impression and Plan   Diagnosis     Diabetes Type I (IIF99-BW E10.9).     Dyslipidemia (SHN13-AH E78.5).       Counseled: Patient,  SAM[facility] Self Care Behaviors;   Discussed living well with diabetes, appropriate treatment and self management  Healthy Eating - Education on importance of accurate carb counting when using an insulin pump.  Reviewed well balanced meals.  Education review on  decreaseing cardiovascular risk with following Therapeutic Lifestyle Changes (TLC) nutrition plan for heart healthy eating.    Being Active - Education on how exercise helps with : discussed delayed exercise induced hypoglycemia    - lowering BG by increasing the muscles ability to take up and use glucose   - weight loss   - healthier heart (improve lipid profile)   - improve sleep, mood, energy   - decrease stress      Monitoring - Reviewed recommended testing schedule and blood glucose target levels.  Encouraged testing 2 hrs after meals to evaluate insulin to carb ratio  Taking Medication - continue with current insulin dosing   Problem Solving -  medical support team assistance, resources provided (written); good control of stress  Healthy Coping - support of friends, family, and medical support team   Reducing Risks - Education on importance of good glucose control to help reduce the potential for developing microvascular and macrovacular complications associated with high blood glucose over time.    Education on the following complications    *heart attack/cardiovascular disease - prevention with heart healthy diet, daily activity, and weight control   *retinopathy - annual eye exam   *nephropathy - discussion on annual or prn kidney function labs with urinanalysis    *stroke - maintaining recommended glucose control   *peripheral aterial disease - regular activity, maintaining recommended glucose control   *neuropathy - monofiliment testing, regular activity, maintaining recommended glucose control  Appropriate foot care education  Vaccinations as recommended influenza, pneumonia etc.   Routine dental appts for prevention of periodontal diseases   Importance of adequate sleep   Good skin care, monitor for any open areas, sores, or cuts.    Weight loss goal of 7 - 10% of body weight pounds as a reasonable goal to help increase insulin sensitivity           Pt able to verbalize understanding of above education,  handouts provided for additional resources.    *Discussed what pump options and CGMS systems are available.  Pt would like to try the Medtronic 670G system with automode and partial closed loop technology.  Pt is shown basic pump and sensor settings, use, and resevoir and CGMS manual and automode.  Pump paperwork filled out and faxed     Goals:   1.  Practice healthy stress management (use stress relieving tips) and get good quality sleep with the goal of 7-8 hours per night.    2.   Physical activity: Recommend increasing to 2 hrs and 30 min a week (150 minutes) episodes of at least 10 minutes, preferably spread throughout the week.    3.  Eat in a healthy way, per diabetic plate method.  Nutrition reference: Eat 3 meals a day; snacks are optional.  A meal is three or more food groups; make it colorful for better nutrition.    4.  Count carbohydrates for meals and snacks   5.  Pt to monitor BG QID.  BG goals: Fasting and before meals 80 - 120 mg/dL, 2 hrs after the start of a meal 80 - 140 mg/dL.    6.  Goal weight 170 by 3/2018  7.  Read up on medtronic 670G   8.  Follow up for pump start         Professional Services   Time spent with pt 60 min   cc Dr. DASHA Damon

## 2022-02-16 NOTE — PROGRESS NOTES
Patient:   LIDDLE, CHAD A            MRN: [id number]            FIN: [id number]               Age:   44 years     Sex:  Male     :  1975   Associated Diagnoses:   Tobacco Abuse-Unspec; Uncontrolled type I diabetes mellitus with proliferative retinopathy; Erectile dysfunction; Obstructive sleep apnea   Author:   Tommy Damon MD      Visit Information      Date of Service: 10/03/2019 02:56 pm  Performing Location: [facility]  Encounter#: [id number]      Primary Care Provider (PCP):  Tommy Damon MD    NPI# [id number]      Referring Provider:  Tommy Damon MD, NPI# [id number]      Chief Complaint   10/3/2019 3:01 PM CDT    Pt here for DM check.              Additional Information:No additional information recorded during visit.   Chief complaint and symptoms as noted above and confirmed with patient.  Recent lab and diagnostic studies reviewed with patient      History of Present Illness   2014: Presents to clinic to establish care, previously seen by [facility]; though more recently transferred his care at [facility], now wishing to return care more locally.  He has a long-time history of type 1 diabetes, generally with a fairly noncompliant course.  He uses Lantus 28 units daily at bedtime and NovoLog 10-14 units per meal, though never checking pre-meal blood sugar before insulin bolus.  He s had issues with admissions for DKA in the past.  He is interested in potentially pursuing insulin pump.  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  He continues to smoke on a daily basis.  He s been experiencing upper left-sided chest and shoulder discomfort that is not related to exertion.  He s also been experiencing abdominal pains for the last several days, typical of what is experienced with diabetic ketoacidosis in the past.    2017: Returns to clinic feeling well.   No recent diabetic changes; using spreadsheet provided; denies any hypoglycemia.  Still issues with decipline checking BS and taking insulin.  Knows he can do better.  Does express genuine interest in insulin pump use.  Erectile function significantly worse; now no real erection despite cialis 20mg use.    3/14/2019: Taye returns to clinic for follow-up.  Primary concern revolves around his emotional well-being and his marriage.  He raises concerns about significant mental health issues in the family bring particular his wife and questions whether he may be depressed himself.  He shares that he and his wife have seen a couple s counselor in the past which she said was a very good overall experience.  She had offered to see his wife individually for suspected depression which is wife never followed through with.  He describes a significant strain in the relationship especially related to sexual health.  He feels that his underlying erectile dysfunction contributes significantly to the issue.  He describes getting excited when able to participate in outdoor hobbies.  In particular describes snowmobiling with his family which gave him a good emotional high.    10/3/2019: Taye returns to clinic related to his type 1 diabetes.  Overall doing well.  Last seen by Sarah in July and some changes made to pump settings.  Describes more unpredictable work schedule which has had some more lability on his blood sugars.  Has been working with urology and finding self penile injections to be quite helpful with his erectile dysfunction.  Brings up concerns related to daytime somnolence and heavy observed snoring by his wife.  Strong history of sleep apnea in the family.         Review of Systems   Constitutional:  No fever, No chills.    Eye:  Negative except as documented in history of present illness.    Ear/Nose/Mouth/Throat:  Negative except as documented in history of present illness.    Respiratory:  No shortness of breath.     Cardiovascular:  No palpitations, No peripheral edema, No syncope.    Gastrointestinal:  No nausea, No vomiting.    Genitourinary:  ED, No dysuria, No hematuria.    Hematology/Lymphatics:  Negative except as documented in history of present illness.    Endocrine:  No excessive thirst, No polyuria.    Immunologic:  No recurrent fevers.    Musculoskeletal:  No joint pain, No muscle pain.    Neurologic:  Alert and oriented X4, Numbness, No tingling, No headache.    Psychiatric:  Depression.       Health Status   Allergies:    Allergic Reactions (Selected)  Severity Not Documented  Ampicillin (Rash)  Simvastatin (Myalgias)   Medications:  (Selected)   Prescriptions  Prescribed  1ST TIER UNIFINE PE 32G X 4MM MISC 32 G X 4MM: 1ST TIER UNIFINE PE 32G X 4MM MISC 32 G X 4MM, See Instructions, Instructions: Use to inject as needed., Supply, # 100 EA, 11 Refill(s), Type: Maintenance, Pharmacy: [facility], keep on file and pt will notify when needed, Use to...  Cris Contour Next test strips: Cris Contour Next test strips, See Instructions, Instructions: testing 4-8 x/ day, Supply, # 800 EA, 3 Refill(s), Type: Maintenance, Pharmacy: [facility], keep on hold and pt will notify when needed, testing 4-8 x/ day  Crestor 10 mg oral tablet: = 1 tab(s) ( 10 mg ), po, hs, # 90 tab(s), 3 Refill(s), Type: Maintenance, Pharmacy: [facility], keep on file and pt will notify when needed, 1 tab(s) Oral hs  NovoLOG 100 units/mL injectable solution: See Instructions, Instructions: 75 units via pump daily, # 70 mL, 3 Refill(s), Type: Maintenance, Pharmacy: [facility], 75 units via pump daily  aspirin 81 mg oral tablet: 1 tab(s) ( 81 mg ), PO, Daily, # 90 tab(s), 3 Refill(s), Type: Maintenance, Pharmacy: [facility], 1 tab(s) po daily  lisinopril 10 mg oral tablet: = 1 tab(s) ( 10 mg ), PO, Daily, # 90 tab(s), 3 Refill(s), Type: Maintenance, Pharmacy:  [facility], 1 tab(s) Oral daily  one touch delica lancets: one touch delica lancets, See Instructions, Instructions: change needle daily, Supply, # 3 box(es), 3 Refill(s), Type: Maintenance, Pharmacy: [facility], keep on hold and pt will notify when needed, change needle daily  Documented Medications  Documented  Viagra 100 mg oral tablet: See Instructions, Instructions: 1 tab(s)   1 hour before sexual activity, # 6 tab(s), 0 Refill(s), Type: Maintenance, Written Rx per Dr Prater,    Medications          *denotes recorded medication          Cris Contour Next test strips: See Instructions, testing 4-8 x/ day, 800 EA, 3 Refill(s).          1ST TIER UNIFINE PE 32G X 4MM MISC 32 G X 4MM: See Instructions, Use to inject as needed., 100 EA, 11 Refill(s).          one touch delica lancets: See Instructions, change needle daily, 3 box(es), 3 Refill(s).          aspirin 81 mg oral tablet: 81 mg, 1 tab(s), PO, Daily, 90 tab(s).          NovoLOG 100 units/mL injectable solution: See Instructions, 75 units via pump daily, 70 mL, 3 Refill(s).          lisinopril 10 mg oral tablet: 10 mg, 1 tab(s), PO, Daily, 90 tab(s), 3 Refill(s).          Crestor 10 mg oral tablet: 10 mg, 1 tab(s), po, hs, 90 tab(s), 3 Refill(s).          *Viagra 100 mg oral tablet: See Instructions, 1 tab(s)   1 hour before sexual activity, 6 tab(s), 0 Refill(s).       Problem list:    All Problems  Diabetes Type I / ICD-9-.01 / Confirmed  DM neuropathy, type I diabetes mellitus / SNOMED CT 320058255 / Confirmed  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 / Confirmed  Dyslipidemia / ICD-9-.4 / Confirmed  Erectile Dysfunction / ICD-9-.84 / Confirmed  Tobacco Abuse-Unspec / ICD-9-.1 / Confirmed  Inactive: GERD (Gastroesophageal Reflux Disease) / ICD-9-.81  Inactive: history of diabeteic ketoacidosis  Resolved: Hospitalized for Diabetes being out of control  Resolved: Retinopathy due to  Secondary Diabetes / ICD-9-.50  Canceled: Dyslipidemia / ICD-9-.4  Canceled: Diabetes mellitus type I / SNOMED CT 918748999      Histories   Past Medical History:    Active  Diabetes Type I (250.01): Onset in the month of 9/1985 at 9 years  Tobacco Abuse-Unspec (305.1)  Resolved  Hospitalized for Diabetes being out of control: Onset in 1991 at 15 years.  Resolved.  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  Comments:  1/13/2014 CST 3:55 PM CST - Arianne HANEY, Nelly  Status post laser therapy (Dr. Sherif Charles)   Family History:    Psoriasis  Father  Heart attack  Grandfather (P)  Hypercholesterolemia  Father  Seizure  Sister (Melissa)  Sister (Tere)  Angioplasty  Father  Uncle (P)  MI - Myocardial infarction  Uncle (P)  CAD - Coronary artery disease  Grandfather (P)  Father  Uncle (P)     Procedure history:    Colonoscopy (206076194) in the month of 1/2015 at 39 Years.  Comments:  7/1/2015 10:07 AM CDT - Monica WING, Ebonie  Colon, descending, polyp:  1.  Tubular adenoma  2.  No evidence of high grade dsyplasia  3.  Polyp size:5mm(resection/retrievel - complete)  - Repeat in 5yrs   Social History:        Alcohol Assessment            Current, Beer (12 oz), 1-2 times per week, 2 drinks/episode average.  3 drinks/episode maximum.      Tobacco Assessment: Current            Current, Cigarettes, 10 per day.      Employment and Education Assessment            Employed, Work/School description: [employer].        Physical Examination   vital signs stable, as noted above   Vital Signs   10/3/2019 3:01 PM CDT Temperature Tympanic 97.8 DegF  LOW    Peripheral Pulse Rate 88 bpm    Pulse Site Radial artery    HR Method Manual    Systolic Blood Pressure 136 mmHg  HI    Diastolic Blood Pressure 74 mmHg    Mean Arterial Pressure 95 mmHg    BP Site Right arm    BP Method Manual      Measurements from flowsheet : Measurements   10/3/2019 3:01 PM CDT Height Measured - Standard 65 in    Weight Measured - Standard  183.0 lb    BSA 1.95 m2    Body Mass Index 30.45 kg/m2  HI      General:  Alert and oriented, No acute distress.    Eye:  Extraocular movements are intact.    HENT:  Tympanic membranes are clear, No pharyngeal erythema.    Respiratory:  Lungs are clear to auscultation, Respirations are non-labored.    Cardiovascular:  Normal rate, Regular rhythm, No murmur, No edema.    Gastrointestinal:  Soft, Non-tender, Non-distended, Normal bowel sounds, wearing insulin pump.    Neurologic:  Alert, Oriented, Normal motor function, No focal deficits.    Cognition and Speech:  Oriented, Speech clear and coherent.    Psychiatric:  Appropriate mood & affect.       Review / Management   Results review:  Lab results: 7/11/2019 4:10 PM CDT    Hgb A1c                   7.8  HI  .       Impression and Plan   Diagnosis     Tobacco Abuse-Unspec (JVN18-RX Z72.0).     Uncontrolled type I diabetes mellitus with proliferative retinopathy (GVX64-KP E10.3599).     Erectile dysfunction (VTB48-GI N52.9).     Obstructive sleep apnea (COE52-VJ G47.33).         .) type I Dm, controlled  hypoglycemic medications: none  insulin therapy: Medtronic insulin pump/CGMS   - ICR 1:6.5   - basal rate: 0.975-1.05   - sensitivity 30mg/dL   - working with Sarah  last HbA1c: 7.8%   microvascular complications: proliferative retinopathy, + DOROTHY, neuropathy   - doesn't think neuropathic pain needing medication at this time  macrovascular complications: none known  ASA/AIDEN/statin:  ASA 81mg daily, lisinopril 10mg daily, rosuvastatin 10mg daily   - smoking cessation counseling    .) erectile dysfunction - almost certainly related to diabetic neuropathy/vascular disease   - poor response to Viagra and Cialis   - working with Urology; seeing a lot of success with penile injections    .) DAYANA, suspected  - high suspicions  - high Dexter sleepiness index (daytime fatigue, snoring, insomnia, etc)    RTC in 4 months         Professional Services   Counseling Summary:  This was  a 25 minute visit with greater than 50% of that time spent counseling the patient.

## 2022-02-16 NOTE — PROGRESS NOTES
Patient:   LIDDLE, CHAD A            MRN: [id number]            FIN: [id number]               Age:   43 years     Sex:  Male     :  1975   Associated Diagnoses:   Tobacco Abuse-Unspec; Uncontrolled type I diabetes mellitus with proliferative retinopathy; Erectile dysfunction   Author:   Tommy Damon MD      Visit Information      Date of Service: 10/05/2018 10:39 am  Performing Location: [facility]  Encounter#: [id number]      Primary Care Provider (PCP):  Tommy Damon MD    NPI# [id number]      Referring Provider:  Tommy Damon MD# [id number]      Chief Complaint   10/5/2018 10:50 AM CDT   Follow up labs/DM              Additional Information:No additional information recorded during visit.   Chief complaint and symptoms as noted above and confirmed with patient.  Recent lab and diagnostic studies reviewed with patient      History of Present Illness   2014: Presents to clinic to establish care, previously seen by [facility]; though more recently transferred his care at [facility], now wishing to return care more locally.  He has a long-time history of type 1 diabetes, generally with a fairly noncompliant course.  He uses Lantus 28 units daily at bedtime and NovoLog 10-14 units per meal, though never checking pre-meal blood sugar before insulin bolus.  He s had issues with admissions for DKA in the past.  He is interested in potentially pursuing insulin pump.  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  He continues to smoke on a daily basis.  He s been experiencing upper left-sided chest and shoulder discomfort that is not related to exertion.  He s also been experiencing abdominal pains for the last several days, typical of what is experienced with diabetic ketoacidosis in the past.    2017: Returns to clinic feeling well.  No recent diabetic changes;  using spreadsheet provided; denies any hypoglycemia.  Still issues with decipline checking BS and taking insulin.  Knows he can do better.  Does express genuine interest in insulin pump use.  Erectile function significantly worse; now no real erection despite cialis 20mg use.    2/15/2018: Taye returns for regular diabetic follow-up.  This past fall he did get started on a Medtronic insulin pump and continuous glucose monitor.  He has love this transition and says he would never go back.  Did have issues with hypoglycemia with a regional pump settings.  Since that time is been doing better.  Current pump settings include basal rate of 1.05 U/h, sensitivity of 25 mg/dL, insulin to carbohydrate ratio of 1-4.  Does describe having some worsening foot neuropathic pains especially at night.    10/5/2018: Taye returns to clinic for follow-up related to his type 1 diabetes.  He has been using Medtronic insulin pump now for nearly 1 year.  Doing well overall.  He does have a continuous glucose monitor available at home though has not yet been instructed on use.  Denies any hypoglycemic features.  He has been using the bolus wizard on a regular basis.  Her last visit I did increase his insulin to carbohydrate ratio up from 1-4-1.5 due to relative hypoglycemia.         Review of Systems   Constitutional:  No fever, No chills.    Eye:  Negative except as documented in history of present illness.    Ear/Nose/Mouth/Throat:  Negative except as documented in history of present illness.    Respiratory:  No shortness of breath.    Cardiovascular:  No palpitations, No peripheral edema, No syncope.    Gastrointestinal:  No nausea, No vomiting.    Genitourinary:  No dysuria, No hematuria.    Hematology/Lymphatics:  Negative except as documented in history of present illness.    Endocrine:  No excessive thirst, No polyuria.    Immunologic:  No recurrent fevers.    Musculoskeletal:  No joint pain, No muscle pain.    Neurologic:  Alert  and oriented X4, Numbness, No tingling, No headache.       Health Status   Allergies:    Allergic Reactions (Selected)  Severity Not Documented  Ampicillin (Rash)  Simvastatin (Myalgias)   Medications:  (Selected)   Prescriptions  Prescribed  1ST TIER UNIFINE PE 32G X 4MM MISC 32 G X 4MM: 1ST TIER UNIFINE PE 32G X 4MM MISC 32 G X 4MM, See Instructions, Instructions: Use to inject as needed., Supply, # 100 EA, 11 Refill(s), Type: Maintenance, Pharmacy: [facility], keep on file and pt will notify when needed, Use to...  Cris Contour Next test strips: Cris Contour Next test strips, See Instructions, Instructions: testing 4-8 x/ day, Supply, # 800 EA, 3 Refill(s), Type: Maintenance, Pharmacy: [facility], testing 4-8 x/ day  Crestor 10 mg oral tablet: = 1 tab(s) ( 10 mg ), po, hs, # 90 tab(s), 3 Refill(s), Type: Maintenance, Pharmacy: [facility], keep on file and pt will notify when needed, 1 tab(s) Oral hs  NovoLOG 100 units/mL injectable solution: See Instructions, Instructions: 75 units via pump daily, # 70 mL, 3 Refill(s), Type: Maintenance, Pharmacy: [facility], 75 units via pump daily  aspirin 81 mg oral tablet: 1 tab(s) ( 81 mg ), PO, Daily, # 90 tab(s), 3 Refill(s), Type: Maintenance, Pharmacy: [facility], 1 tab(s) po daily  lisinopril 10 mg oral tablet: = 1 tab(s) ( 10 mg ), PO, Daily, # 90 tab(s), 3 Refill(s), Type: Maintenance, Pharmacy: [facility], 1 tab(s) Oral daily  one touch delica lancets: one touch delica lancets, See Instructions, Instructions: change needle daily, Supply, # 3 box(es), 3 Refill(s), Type: Maintenance, Pharmacy: [facility], change needle daily  Documented Medications  Documented  Viagra 100 mg oral tablet: See Instructions, Instructions: 1 tab(s)   1 hour before sexual activity, # 6 tab(s), 0 Refill(s), Type: Maintenance, Written Rx per   Prall  ibuprofen: 0 Refill(s), Type: Maintenance   Problem list:    All Problems  Diabetes Type I / ICD-9-.01 / Confirmed  DM neuropathy, type I diabetes mellitus / SNOMED CT 163455206 / Confirmed  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 / Confirmed  Dyslipidemia / ICD-9-.4 / Confirmed  Erectile Dysfunction / ICD-9-.84 / Confirmed  Tobacco Abuse-Unspec / ICD-9-.1 / Confirmed  Inactive: GERD (Gastroesophageal Reflux Disease) / ICD-9-.81  Inactive: history of diabeteic ketoacidosis  Resolved: Hospitalized for Diabetes being out of control  Resolved: Retinopathy due to Secondary Diabetes / ICD-9-.50  Canceled: Dyslipidemia / ICD-9-.4  Canceled: Diabetes mellitus type I / SNOMED CT 054909735      Histories   Past Medical History:    Active  Diabetes Type I (250.01): Onset in the month of 9/1985 at 9 years  Tobacco Abuse-Unspec (305.1)  Resolved  Hospitalized for Diabetes being out of control: Onset in 1991 at 15 years.  Resolved.  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  Comments:  1/13/2014 CST 3:55 PM CST - Nelly Acuña MD  Status post laser therapy (Dr. Sherif Charles)   Family History:    Psoriasis  Father  Heart attack  Grandfather (P)  Hypercholesterolemia  Father  Seizure  Sister (Melissa)  Sister (Tere)  Angioplasty  Father  Uncle (P)  MI - Myocardial infarction  Uncle (P)  CAD - Coronary artery disease  Grandfather (P)  Father  Uncle (P)     Procedure history:    Colonoscopy ([id number]) in the month of 1/2015 at 39 Years.  Comments:  7/1/2015 10:07 AM - Ebonie Anderson CMA  Colon, descending, polyp:  1.  Tubular adenoma  2.  No evidence of high grade dsyplasia  3.  Polyp size:5mm(resection/retrievel - complete)  - Repeat in 5yrs   Social History:        Alcohol Assessment            Current, Beer (12 oz), 1-2 times per week, 2 drinks/episode average.  3 drinks/episode maximum.      Tobacco Assessment: Current            Current, Cigarettes, 10 per day.       Employment and Education Assessment            Employed, Work/School description: [employer].        Physical Examination   vital signs stable, as noted above   Vital Signs   10/5/2018 10:50 AM CDT Temperature Tympanic 97.5 DegF  LOW    Peripheral Pulse Rate 92 bpm    Systolic Blood Pressure 110 mmHg    Diastolic Blood Pressure 66 mmHg    Mean Arterial Pressure 81 mmHg    BP Site Right arm    BP Method Manual    Oxygen Saturation 96 %      Measurements from flowsheet : Measurements   10/5/2018 10:50 AM CDT Height/Length Estimated 65 in    Weight Measured - Standard 176 lb      General:  Alert and oriented, No acute distress.    Eye:  Extraocular movements are intact.    HENT:  Tympanic membranes are clear, No pharyngeal erythema.    Respiratory:  Lungs are clear to auscultation, Respirations are non-labored.    Cardiovascular:  Regular rhythm, No edema.    Gastrointestinal:  Soft, Non-tender, Non-distended, Normal bowel sounds, wearing insulin pump.    Neurologic:  Alert, Oriented, Normal motor function, No focal deficits.    Cognition and Speech:  Oriented, Speech clear and coherent.    Psychiatric:  Appropriate mood & affect.       Review / Management   Results review:  Lab results   9/28/2018 8:31 AM CDT Sodium Level 138 mmol/L    Potassium Level 4.3 mmol/L    Chloride Level 102 mmol/L    CO2 Level 29 mmol/L    Glucose Level 146 mg/dL  HI    BUN 15 mg/dL    Creatinine 1.15 mg/dL    BUN/Creat Ratio NOT APPLICABLE    eGFR 77 mL/min/1.73m2    eGFR African American 89 mL/min/1.73m2    Calcium Level 9.8 mg/dL    Hgb A1c 8.0  HI    U Microalbumin 9.7 mg/dL    Ur Creatinine 152 mg/dL    Ur Microalb/Creat Ratio 64  HI   .       Impression and Plan   Diagnosis     Tobacco Abuse-Unspec (HLF03-NZ Z72.0).     Uncontrolled type I diabetes mellitus with proliferative retinopathy (CPR79-JO E10.3599).     Erectile dysfunction (EPS24-DU N52.9).         .) type I Dm, controlled  hypoglycemic medications: none  insulin therapy:  Medtronic insulin pump/CGMS   - ICR 1:5 (has been over-riding bolus wizard regularly by reducing insulin dosing by 1-2 units when set at 1:4)   - basal rate: 1.05   - sensitivity 25mg/dL   - needs to get in with Sarah for CGMS and pump/meter download for further titration  last HbA1c: 8%   microvascular complications: proliferative retinopathy, + DOROTHY, neuropathy   - doesn't think neuropathic pain needing medication at this time  macrovascular complications: none known  ASA/AIDEN/statin:  ASA 81mg daily, lisinopril 10mg daily, rosuvastatin 10mg daily   - smoking cessation counseling    .) erectile dysfunction - almost certainly related to diabetic neuropathy/vascular disease    .) weight gains   - lifestyle modification     RTC in 4 months         Professional Services   Counseling Summary:  This was a 25 minute visit with greater than 50% of that time spent counseling the patient.

## 2022-02-16 NOTE — NURSING NOTE
Comprehensive Intake Entered On:  10/15/2021 1:22 PM CDT    Performed On:  10/15/2021 1:18 PM CDT by Ca Salmeron LPN               Summary   Chief Complaint :   severe pain in the groin area, dull pain over the last couple of weeks, increased in severity the past 4-5 days   Weight Measured :   181.7 lb(Converted to: 181 lb 11 oz, 82.418 kg)    Height Measured :   65 in(Converted to: 5 ft 5 in, 165.10 cm)    Body Mass Index :   30.23 kg/m2 (HI)    Body Surface Area :   1.94 m2   Systolic Blood Pressure :   164 mmHg (HI)    Diastolic Blood Pressure :   78 mmHg   Mean Arterial Pressure :   107 mmHg   Peripheral Pulse Rate :   90 bpm   BP Site :   Right arm   BP Method :   Manual   Temperature Tympanic :   98.4 DegF(Converted to: 36.9 DegC)    Oxygen Saturation :   96 %   Ca Salmeron LPN - 10/15/2021 1:18 PM CDT   Health Status   Allergies Verified? :   Yes   Medication History Verified? :   Yes   Immunizations Current :   Yes   Medical History Verified? :   No   Pre-Visit Planning Status :   Completed   Tobacco Use? :   Current every day smoker   Ca Salmeron LPN - 10/15/2021 1:18 PM CDT   Meds / Allergies   (As Of: 10/15/2021 1:22:47 PM CDT)   Allergies (Active)   ampicillin  Estimated Onset Date:   Unspecified ; Reactions:   Rash ; Created By:   Malika Wilson CMA; Reaction Status:   Active ; Category:   Drug ; Substance:   ampicillin ; Type:   Allergy ; Updated By:   Malika Wilson CMA; Reviewed Date:   12/15/2020 10:37 AM CST      simvastatin  Estimated Onset Date:   Unspecified ; Reactions:   myalgias ; Created By:   Srinivas Brown MD; Reaction Status:   Active ; Category:   Drug ; Substance:   simvastatin ; Type:   Allergy ; Updated By:   Srinivas Brown MD; Reviewed Date:   12/15/2020 10:37 AM CST        Medication List   (As Of: 10/15/2021 1:22:47 PM CDT)   Prescription/Discharge Order    insulin aspart  :   insulin aspart ; Status:   Prescribed ; Ordered As Mnemonic:   NovoLOG 100  units/mL injectable solution ; Simple Display Line:   See Instructions, 75 units via pump daily, 90 mL, 1 Refill(s) ; Ordering Provider:   Tommy Damon MD; Catalog Code:   insulin aspart ; Order Dt/Tm:   8/26/2020 9:21:05 AM CDT          Miscellaneous Rx Supply  :   Miscellaneous Rx Supply ; Status:   Prescribed ; Ordered As Mnemonic:   one touch delica lancets ; Simple Display Line:   See Instructions, change needle daily-testing up to 8 times daily, 400 EA, 3 Refill(s) ; Ordering Provider:   Tommy Damon MD; Catalog Code:   Miscellaneous Rx Supply ; Order Dt/Tm:   8/26/2020 9:21:07 AM CDT          Miscellaneous Prescription  :   Miscellaneous Prescription ; Status:   Prescribed ; Ordered As Mnemonic:   Cris Contour Next test strips ; Simple Display Line:   See Instructions, testing 4-8 x/ day, 800 EA, 3 Refill(s) ; Ordering Provider:   Tommy Damon MD; Catalog Code:   Miscellaneous Prescription ; Order Dt/Tm:   8/26/2020 9:21:05 AM CDT          Miscellaneous Rx Supply  :   Miscellaneous Rx Supply ; Status:   Prescribed ; Ordered As Mnemonic:   Auto Titrating CPAP 6-16 for daily use ; Simple Display Line:   See Instructions, Heated humidifier x1; Humidifier chamber x1;  Heated tubing x1; Full face mask of choice with headgear  x1; Cushion x 1;  Filters: Disposable x1pk & Reusable x1pk.  Length of Need = 99 Months, 1 EA, 11 Refill(s) ; Ordering Provider:   Alexander Melgar MD; Catalog Code:   Miscellaneous Rx Supply ; Order Dt/Tm:   11/8/2019 1:34:17 PM CST          Miscellaneous Rx Supply  :   Miscellaneous Rx Supply ; Status:   Prescribed ; Ordered As Mnemonic:   1ST TIER UNIFINE PE 32G X 4MM MISC 32 G X 4MM ; Simple Display Line:   See Instructions, Use to inject as needed., 100 EA, 11 Refill(s) ; Ordering Provider:   Tommy Damon MD; Catalog Code:   Miscellaneous Rx Supply ; Order Dt/Tm:   8/26/2020 9:21:06 AM CDT          aspirin  :   aspirin ; Status:   Prescribed ; Ordered As Mnemonic:   aspirin 81 mg oral tablet  ; Simple Display Line:   81 mg, 1 tab(s), PO, Daily, 90 tab(s) ; Ordering Provider:   Nelly Acuña MD; Catalog Code:   aspirin ; Order Dt/Tm:   1/10/2014 2:24:22 PM CST          rosuvastatin  :   rosuvastatin ; Status:   Prescribed ; Ordered As Mnemonic:   Crestor 10 mg oral tablet ; Simple Display Line:   10 mg, 1 tab(s), po, hs, 90 tab(s), 3 Refill(s) ; Ordering Provider:   Tommy Damon MD; Catalog Code:   rosuvastatin ; Order Dt/Tm:   8/26/2020 9:21:08 AM CDT            Home Meds    capromab pendetide  :   capromab pendetide ; Status:   Documented ; Ordered As Mnemonic:   ProstaScint 0.5 mg/mL intravenous solution ; Simple Display Line:   0 Refill(s) ; Catalog Code:   capromab pendetide ; Order Dt/Tm:   4/28/2020 1:36:07 PM CDT          lisinopril  :   lisinopril ; Status:   Documented ; Ordered As Mnemonic:   lisinopril 10 mg oral tablet ; Simple Display Line:   10 mg, 1 tab(s), Oral, daily, 0 Refill(s) ; Catalog Code:   lisinopril ; Order Dt/Tm:   4/28/2020 1:31:41 PM CDT

## 2022-02-16 NOTE — PROGRESS NOTES
Chief Complaint    Patient is here today to f/u HST  History of Present Illness      Patient here for follow-up of a home sleep study.  He has chronic heroic snoring unrefreshing sleep and daytime sleepiness.  BMI is over 30 he has undiagnosed hypertension.  Of sore score greater than 10.  Has type 1 diabetes.  He has a gained weight in the last couple of years since he is .  Review of Systems      No headache, heartburn, chest pain, dyspnea, edema.  No nocturia.  Physical Exam   Vitals & Measurements    T: 97.1   F (Tympanic)  HR: 89(Peripheral)  BP: 148/78  SpO2: 92%     HT: 65 in  WT: 183.08 lb  BMI: 30.46       Healthy-appearing man in no distress.  Alert and oriented.  HEENT exam is Mallampati 3.  Neck is without adenopathy or thyromegaly.  Chest is clear.  Heart exam regular.  No edema.  Cranial nerves normal.  Assessment/Plan       Obstructive sleep apnea syndrome, severe (G47.33)         Severe obstructive sleep apnea by home sleep test.  I discussed all the available treatment modalities as well as the option for at home AutoPap titration versus in lab titration.  Patient prefers AutoPap titration if she will arrange.         Ordered:          Miscellaneous Rx Supply, Auto Titrating CPAP 6-16 for daily use, See Instructions, Instructions: Heated humidifier x1; Humidifier chamber x1; Heated tubing x1; Full face mask of choice with headgear x1; Cushion x 1; Filters: Disposable x1pk & Reusable x1pk. Length of Need..., (Ordered)          [id number] office outpatient new 30 minutes (Charge), Quantity: 1, Obstructive sleep apnea syndrome, severe           Patient Information     Name:LIDDLE, QUITA LUNDBERG      Address:      [address] [place] [id number]     Sex:Male     YOB: 1975     [phone]     Emergency Contact:LIDDLE, HEATHER M     MRN:[id number]     FIN:[id number]     Location:[facility]     Date of Service:11/08/2019      Primary Care Physician:       Nelson  Tommy HANEY, [phone]      Attending Physician:       Alexander Melgar MD, [phone]  Problem List/Past Medical History    Ongoing     Diabetes Type I     Diabetic Retinopathy       Comments: previous laser treatment     DM neuropathy, type I diabetes mellitus     Dyslipidemia     Erectile Dysfunction     GERD (Gastroesophageal Reflux Disease)     history of diabeteic ketoacidosis     Obstructive sleep apnea syndrome, severe       Comments: On 9/23/19 AFUA 48.7 and oximetry eugenio 69% on HST.     Tobacco Abuse-Unspec    Historical     Hospitalized for Diabetes being out of control     Retinopathy due to Secondary Diabetes       Comments: Status post laser therapy (Dr. Sherif Charles)  Procedure/Surgical History     Colonoscopy (01.2015)      Comments: Colon, descending, polyp:  1.  Tubular adenoma  2.  No evidence of high grade dsyplasia  3.  Polyp size:5mm(resection/retrievel - complete)  - Repeat in 5yrs.  Medications    1ST TIER UNIFINE PE 32G X 4MM MISC 32 G X 4MM, See Instructions, 11 refills    aspirin 81 mg oral tablet, 81 mg= 1 tab(s), Oral, daily, 3 refills    Auto Titrating CPAP 6-16 for daily use, See Instructions, 11 refills    Cris Contour Next test strips, See Instructions, 3 refills    Crestor 10 mg oral tablet, 10 mg= 1 tab(s), Oral, hs, 3 refills    lisinopril 10 mg oral tablet, 10 mg= 1 tab(s), Oral, daily, 3 refills    NovoLOG 100 units/mL injectable solution, See Instructions, 3 refills    one touch delica lancets, See Instructions, 3 refills    Viagra 100 mg oral tablet, See Instructions  Allergies    ampicillin (Rash)    simvastatin (myalgias)  Social History    Smoking Status - 11/08/2019     Current every day smoker     Alcohol      Current, Beer (12 oz), 1-2 times per week, 2 drinks/episode average. 3 drinks/episode maximum., 12/16/2016     Employment/School      Employed, Work/School description: [employer] 08/31/2010     Tobacco - Current, 06/09/2010      Current, Cigarettes, 10 per  day., 06/09/2010  Family History    Angioplasty: Father and Uncle (P).    CAD - Coronary artery disease: Father, Grandfather (P) and Uncle (P).    Heart attack: Grandfather (P).    Hypercholesterolemia: Father.    MI - Myocardial infarction: Uncle (P).    Psoriasis: Father.    Seizure: Sister and Sister.    Sleep apnea syndrome: Father, Sister and Sister.    Mother: History is negative    Sister: History is negative  Immunizations      Vaccine Date Status Comments      influenza virus vaccine, inactivated 10/03/2019 Given      influenza virus vaccine, inactivated 10/05/2018 Given      tetanus/diphth/pertuss (Tdap) adult/adol 02/15/2018 Given      influenza virus vaccine, inactivated 08/24/2017 Given      influenza virus vaccine, inactivated 12/16/2016 Given      influenza virus vaccine, inactivated 11/25/2015 Given      pneumococcal (PPSV23) 01/10/2014 Given      influenza virus vaccine, inactivated 09/27/2013 Given      influenza virus vaccine, inactivated 09/28/2012 Given      influenza virus vaccine, inactivated 10/28/2011 Given      influenza virus vaccine, inactivated 10/19/2010 Given      pneumococcal 10/09/2009 Recorded      influenza 10/09/2009 Recorded      tetanus/diphth/pertuss (Tdap) adult/adol 02/25/2008 Recorded      Td 07/18/1995 Recorded      MMR (measles/mumps/rubella) 04/06/1992 Recorded      OPV 07/16/1979 Recorded      DTP (obsolete) 07/16/1979 Recorded      OPV 12/17/1975 Recorded      DTP (obsolete) 10/08/1975 Recorded      MMR (measles/mumps/rubella) 10/08/1975 Recorded      OPV 02/21/1975 Recorded      DTP (obsolete) 02/21/1975 Recorded      OPV 1974 Recorded      DTP (obsolete) 1974 Recorded  Lab Results          Lab Results (Last 4 results within 90 days)           Sodium Level: 138 mmol/L [135 mmol/L - 146 mmol/L] (10/03/19 15:46:00)          Potassium Level: 3.9 mmol/L [3.5 mmol/L - 5.3 mmol/L] (10/03/19 15:46:00)          Chloride Level: 103 mmol/L [98 mmol/L - 110 mmol/L]  (10/03/19 15:46:00)          CO2 Level: 26 mmol/L [20 mmol/L - 32 mmol/L] (10/03/19 15:46:00)          Glucose Level: 44 mg/dL Low [65 mg/dL - 99 mg/dL] (10/03/19 15:46:00)          BUN: 18 mg/dL [7 mg/dL - 25 mg/dL] (10/03/19 15:46:00)          Creatinine Level: 1.06 mg/dL [0.6 mg/dL - 1.35 mg/dL] (10/03/19 15:46:00)          BUN/Creat Ratio: NOT APPLICABLE [6  - 22] (10/03/19 15:46:00)          eGFR: 84 mL/min/1.73m2 (10/03/19 15:46:00)          eGFR African American: 98 mL/min/1.73m2 (10/03/19 15:46:00)          Calcium Level: 10.1 mg/dL [8.6 mg/dL - 10.3 mg/dL] (10/03/19 15:46:00)          U Microalbumin: 19 mg/dL (10/03/19 15:46:00)          Ur Creatinine: 117 mg/dL [20 mg/dL - 320 mg/dL] (10/03/19 15:46:00)          Ur Microalbumin/Creatinine Ratio: 162 High (10/03/19 15:46:00)  Diagnostic Results   Home sleep test done October 30, 2019 reveals an AFUA of 48.7 and an oximetry eugenio of 69%.

## 2022-02-16 NOTE — NURSING NOTE
Comprehensive Intake Entered On:  12/28/2021 7:26 AM CST    Performed On:  12/28/2021 7:22 AM CST by Franco Anderson CMA               Summary   Chief Complaint :   BS running high for the past wee- feeling lightheaded/dizzy - has vomiited x 3 franco to dizziness    Weight Measured :   186.8 lb(Converted to: 186 lb 13 oz, 84.731 kg)    Height Measured :   65 in(Converted to: 5 ft 5 in, 165.10 cm)    Body Mass Index :   31.08 kg/m2 (HI)    Body Surface Area :   1.97 m2   Temperature Tympanic :   98.7 DegF(Converted to: 37.1 DegC)    Oxygen Saturation :   99 %   Franco Anderson CMA - 12/28/2021 7:22 AM CST   Health Status   Allergies Verified? :   Yes   Medication History Verified? :   Yes   Immunizations Current :   Yes   Medical History Verified? :   Yes   Pre-Visit Planning Status :   Completed   Tobacco Use? :   Current every day smoker   Franco Anderson CMA - 12/28/2021 7:22 AM CST   Social History   Social History   (As Of: 12/28/2021 7:26:17 AM CST)   Alcohol:        Current, Beer (12 oz), 1-2 times per week, 2 drinks/episode average.  3 drinks/episode maximum.   (Last Updated: 12/16/2016 10:29:17 AM CST by Franco Anderson CMA)          Tobacco:  Current      Current, Cigarettes, 10 per day.   (Last Updated: 6/9/2010 3:17:18 PM CDT by Yani Santana CMA)   10 or more cigarettes (1/2 pack or more)/day in last 30 days   (Last Updated: 12/28/2021 7:22:55 AM CST by Franco Anderson CMA)          Electronic Cigarette/Vaping:        Electronic Cigarette Use: Never.   (Last Updated: 12/28/2021 7:22:59 AM CST by Franco Anderson CMA)          Employment/School:        Employed, Work/School description: [employer].   (Last Updated: 8/31/2010 3:21:41 PM CDT by Srinivas Brown MD)            More Vitals   Systolic Blood Pressure Supine :   112 mmHg   Diastolic Blood Pressure Supine :   67 mmHg   Systolic Blood Pressure Sitting :   104 mmHg   Diastolic Blood Pressure Sitting :   57 mmHg   Systolic Blood Pressure Standing :   119 mmHg    Diastolic Blood Pressure Standing :   73 mmHg   Pulse Supine :   81 bpm   Pulse Sitting :   77 bpm (HI)    Pulse Standing :   77 bpm   Ebonie Anderson CMA - 12/28/2021 7:22 AM CST

## 2022-02-16 NOTE — LETTER
(Inserted Image. Unable to display)   July 29, 2020        CHAD LIDDLE  [address] [place], [place] [id number]        Dear QUITA,      Thank you for selecting [facility] for your healthcare needs.    Our records indicate you are due for the following services:    Diabetic Exam ~ Please bring your glucose meter and/or your blood glucose diary to your appointment.  Urine labs ~ Please be prepared to leave a urine specimen for evaluation  Fasting Lab Tests ~ Please do not eat or drink anything 10 hours prior to your scheduled appointment time.  (Water and any medications that you may need are allowed unless directed otherwise.)    If you had your labs done at another facility or with Direct Access Lab Testing at [facility], please bring in a copy of the results to your next visit, mail a copy, or drop off a copy of your results to your Healthcare Provider.    You are due for lab work and an office visit, please schedule the lab appointment 1 week before the office visit.  This will assure all results are available to discuss with your provider during your visit.    **It is very helpful if you bring your medication bottles to your appointment.  This assures we have all of your current medications, including strength and dosing information, documented accurately in your medical record.    To schedule an appointment or if you have further questions, please contact your primary clinic:   [facility]       [phone]   [facility]       [phone]              [facility]     [phone]      Powered by my[employer]Room    Sincerely,    Tommy Damon M.D.

## 2022-02-16 NOTE — TELEPHONE ENCOUNTER
---------------------  From: Vasquez Parekh   To: Appointment Pool ([id number]);     Sent: 12/15/2020 11:22:37 AM CST  Subject: Covid     Please schedule pt on didi today for covid test per JHON

## 2022-02-16 NOTE — PROGRESS NOTES
Patient:   LIDDLE, CHAD A            MRN: [id number]            FIN: [id number]               Age:   42 years     Sex:  Male     :  1975   Associated Diagnoses:   Diabetes Type I; Dyslipidemia   Author:   Sindhu Gannon      Visit Information      Date of Service: 2017 03:48 pm  Performing Location: [facility]  Encounter#: [id number]      Primary Care Provider (PCP):  Tommy Damon MD    NPI# [id number]   Visit type:  Diabetes Self Management Education .    Referral source:  Tommy Damon MD.       Chief Complaint   Uncontrolled DM Type I       History of Present Illness   Pt has had TID for 32 years.  He is here today to start the medtronic 670G pump.  The CGMS will be shipped in the next couple of months.  Pt is excited.    Insulin: Lantus 28u - dose has not changed since last seen RD in , Novolog - really working on counting carbs and using about 1u:4gm CHO.    Routine DM care: eye exams with [facility] -due and has had laser treatments related to retinopathy, feet is aware to be cautious and wears steel toe shoes at work and well insulated boots in the winter, dental exam due, skin no concerns.    Exercise: no set routine but tries not to be sedentary  Stress: mod/ high work/ life balance       Health Status   Allergies:    Allergic Reactions (Selected)  Severity Not Documented  Ampicillin (Rash)  Simvastatin (Myalgias)   Medications:  (Selected)   Prescriptions  Prescribed  1ST TIER UNIFINE PE 32G X 4MM MISC 32 G X 4MM: 1ST TIER UNIFINE PE 32G X 4MM MISC 32 G X 4MM, See Instructions, Instructions: Use to inject as needed., Supply, # 100 EA, 11 Refill(s), Type: Maintenance, Pharmacy: [facility] #[id number], keep on file and pt will notify when needed, Use to inject as...  Atrovent 42 mcg/inh nasal spray: 2 spray(s), nasal, qid, PRN: for nasal congestion, # 1 EA, 2 Refill(s), Type: Maintenance, Pharmacy: [facility], 2 spray(s) nasal qid,PRN:for nasal congestion  Cris Contour Next test  strips: Cris Contour Next test strips, See Instructions, Instructions: testing 4x/ day, Supply, # 8 box(es), 3 Refill(s), Type: Maintenance, Pharmacy: [facility], testing 4x/ day  Cialis 20 mg oral tablet: 1 tab(s) ( 20 mg ), po, daily, # 30 tab(s), 1 Refill(s), Pharmacy: [facility], keep on file and pt will notify when needed, 1 tab(s) po daily  Crestor 10 mg oral tablet: 1 tab(s) ( 10 mg ), po, hs, # 90 tab(s), 3 Refill(s), Type: Maintenance, Pharmacy: [facility] #[id number], keep on file and pt will notify when needed, 1 tab(s) po hs  Lantus Solostar Pen 100 units/mL subcutaneous solution: ( 28 unit(s) ), subcutaneous, hs, x 30 day(s), # 30 mL, 3 Refill(s), Type: Physician Stop, Pharmacy: [facility], 28 unit(s) subcutaneous hs,x30 day(s)  NovoLOG 100 units/mL subcutaneous solution: See Instructions, Instructions: using up to 75u daily, # 70 mL, 3 Refill(s), Type: Maintenance, Pharmacy: [facility], using up to 75u daily  NovoLOG FlexPen 100 units/mL subcutaneous solution: See Instructions, Instructions: INJECT 8-10 units with meals, # 30 mL, 2 Refill(s), Pharmacy: [facility], keep on file and pt will notify when needed, INJECT 8-10 units with meals  aspirin 81 mg oral tablet: 1 tab(s) ( 81 mg ), PO, Daily, # 90 tab(s), 3 Refill(s), Type: Maintenance, Pharmacy: [facility], 1 tab(s) po daily  lisinopril 5 mg oral tablet: 1 tab(s) ( 5 mg ), PO, Daily, # 90 tab(s), 3 Refill(s), Type: Maintenance, Pharmacy: [facility] #[id number], keep on file and pt will notify when needed, 1 tab(s) po daily  one touch delica lancets: one touch delica lancets, See Instructions, Instructions: change needle daily, Supply, # 3 box(es), 3 Refill(s), Type: Maintenance, Pharmacy: [facility], change needle daily  Documented Medications  Documented  Viagra 100 mg oral tablet: See Instructions, Instructions: 1 tab(s)   1 hour before  sexual activity, # 6 tab(s), 0 Refill(s), Type: Maintenance, Written Rx per Dr Prater  ibuprofen: 0 Refill(s), Type: Maintenance   Problem list:    All Problems  Diabetes Type I / ICD-9-.01 / Confirmed  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 / Confirmed  Dyslipidemia / ICD-9-.4 / Confirmed  Erectile Dysfunction / ICD-9-.84 / Confirmed  Tobacco Abuse-Unspec / ICD-9-.1 / Confirmed  Inactive: GERD (Gastroesophageal Reflux Disease) / ICD-9-.81  Inactive: history of diabeteic ketoacidosis  Resolved: Hospitalized for Diabetes being out of control  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  Resolved.  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  Comments:  1/13/2014 CST 3:55 PM CST - Arianne HANEY, Nelly  Status post laser therapy (Dr. Sherif Charles)   Family History:    Psoriasis  Father  Heart attack  Grandfather (P)  Hypercholesterolemia  Father  Seizure  Sister (Melissa)  Sister (Tere)  Angioplasty  Father  Uncle (P)  MI - Myocardial infarction  Uncle (P)  CAD - Coronary artery disease  Grandfather (P)  Father  Uncle (P)     Procedure history:    Colonoscopy (SNOMED CT 903800412) performed by Dalton Lee in the month of 1/2015 at 39 Years.  Comments:  7/1/2015 10:07 AM - Ebonie Anderson CMA  Colon, descending, polyp:  1.  Tubular adenoma  2.  No evidence of high grade dsyplasia  3.  Polyp size:5mm(resection/retrievel - complete)  - Repeat in 5yrs   Social History:        Alcohol Assessment            Current, Beer (12 oz), 1-2 times per week, 2 drinks/episode average.  3 drinks/episode maximum.      Tobacco Assessment: Current             Current, Cigarettes, 10 per day.      Employment and Education Assessment            Employed, Work/School description: [employer].        Physical Examination   VS/Measurements      Health Maintenance      Recommendations     Pending (in the next year)        OverDue           DM - Foot Exam due  05/17/14  and every 1  year(s)           DM - Eye Exam due  12/20/14  and every 1  year(s)        Due            Exercise due  11/08/17  and every 1  year(s)           Preventative Services due  11/08/17  and every 5  year(s)        Near Due            DM - HgbA1c near due  11/24/17  and every 3  month(s)           Lipid Disorders Screen (Male) near due  12/16/17  and every 1  year(s)           Alcohol Misuse Screen (Male) near due  12/16/17  and every 1  year(s)           Depression Screen (Male) near due  12/16/17  and every 1  year(s)        Due In Future            Tetanus Vaccine not due until  02/25/18  and every 10  year(s)           Influenza Vaccine not due until  08/24/18  and every 1  year(s)           DM - Microalbumin not due until  08/24/18  and every 1  year(s)           Type 2 Diabetes Mellitus Screen (Male) not due until  08/24/18  and every 1  year(s)           High Blood Pressure Screen (Male) not due until  08/28/18  and every 1  year(s)           Body Mass Index Check (Male) not due until  11/06/18  and every 1  year(s)     Satisfied (in the past 1 year)        Satisfied            Alcohol Misuse Screen (Male) on  12/16/16.           Body Mass Index Check (Male) on  11/06/17.           Body Mass Index Check (Male) on  09/29/17.           Body Mass Index Check (Male) on  08/28/17.           Body Mass Index Check (Male) on  02/08/17.           Body Mass Index Check (Male) on  02/04/17.           Body Mass Index Check (Male) on  11/28/16.           DM - HgbA1c on  08/24/17.           DM - HgbA1c on  12/16/16.           DM - Microalbumin on  08/24/17.           DM - Microalbumin on  08/24/17.            Depression Screen (Male) on  12/16/16.           Depression Screen (Male) on  12/16/16.           Depression Screen (Male) on  12/16/16.           High Blood Pressure Screen (Male) on  08/28/17.           High Blood Pressure Screen (Male) on  08/24/17.           High Blood Pressure Screen (Male) on  02/08/17.           High Blood Pressure Screen (Male) on  02/06/17.           High Blood Pressure Screen (Male) on  02/04/17.           High Blood Pressure Screen (Male) on  12/16/16.           High Blood Pressure Screen (Male) on  11/28/16.           Influenza Vaccine on  08/24/17.           Influenza Vaccine on  12/16/16.           Lipid Disorders Screen (Male) on  12/16/16.           Lipid Disorders Screen (Male) on  12/16/16.           Lipid Disorders Screen (Male) on  12/16/16.           Lipid Disorders Screen (Male) on  12/16/16.           Type 2 Diabetes Mellitus Screen (Male) on  08/24/17.           Type 2 Diabetes Mellitus Screen (Male) on  12/16/16.          Review / Management   Results review:  Lab results   8/28/2017 11:17 AM CDT Culture Strep A See comment   8/28/2017 10:53 AM CDT Group A Strep POC NOT DETECTED   8/24/2017 5:27 PM CDT Sodium Level 134 mmol/L  LOW    Potassium Level 4.0 mmol/L    Chloride Level 96 mmol/L  LOW    CO2 Level 30 mmol/L    Glucose Level 375 mg/dL  HI    BUN 20 mg/dL    Creatinine 1.21 mg/dL    BUN/Creat Ratio NOT APPLICABLE    eGFR 73 mL/min/1.73m2    eGFR African American 85 mL/min/1.73m2    Calcium Level 10.2 mg/dL    Hgb A1c 10.3  HI    U Microalbumin 1.2 mg/dL    Ur Creatinine 43 mg/dL    Ur Microalb/Creat Ratio 28   .       Impression and Plan   Diagnosis     Diabetes Type I (WEX23-MI E10.9).     Dyslipidemia (AMM77-SK E78.5).       Insulin Pump start up edu completed.  Pump settings (bassl/bolus), menu options and use, bolus wizzard, suspend, temp basel, BG testing recommendations, hypo/hyperglycemia guidelines, influsion set changes and fill and DKA prevention.  Insulin  pump insertion completed successfully.      ICR=   12:00 4    Sensitivity=    Target 120 - 120     Basal: = 25.2u/ day     MN- 1.05    Insulin action 3 hours  Max Bolus  20u     Goals:   1.  Practice healthy stress management and get good quality sleep with the goal of 7-8 hours per night.    2.   Physical activity: Recommend 30 min of physical activity on 5 or more days/ week.    3.  Eat in a healthy way, per diabetic plate method.  Nutrition reference: Eat 3 meals a day; snacks are optional.  A meal is three or more food groups; make it colorful for better nutrition.    4. Count carbohydrates and use bolus wizzard for all meals and snacks   5.  BG testing 3-4+x/ day initially with pump start Goal premeals 80 - 120; 2 hrs after meals 80 - 150   6.  Be aware of s/sx of hypo/ hyperglycemia and follow treatment protocol   7.  Always have back up pump supplies on hand and vial/ syringe with battery and glucose tablets   8.  follow up in ~1 mo           Professional Services   Time spent with pt 60 min   cc Dr. DASHA Damon

## 2022-02-16 NOTE — PROGRESS NOTES
Patient:   LIDDLE, CHAD A            MRN: [id number]            FIN: [id number]               Age:   45 years     Sex:  Male     :  1975   Associated Diagnoses:   Suspected COVID-19 virus infection   Author:   Salbador De Oliveira MD      Impression and Plan   Diagnosis     Suspected COVID-19 virus infection (HJM20-OL R68.89).     Course:  Worsening.    Plan:    1. COVID testing  2. Self Quarantine until test negative  3. Symptomatic treatments including OTC antipyretics and cough suppressants, rest and fluids.  4. Go to ER for severe symptoms  5. [facility] will contact you if the test is positive..    Summary:  MDM:  1. Medical Record Reviewed prior to the encounter to establish possible comorbidities and risk stratification  2. Discussed current symptoms and general medical condition  3. Discussed plan for further evaluation and treatment  4. Post encounter reviewed test results and notified patient of the results  5. Post encounter revised treatment plan based on updated condition and test results  .    Orders     Orders   Charges (Evaluation and Management):  83604 office outpatient visit 15 minutes (Charge) (Order): Quantity: 1, Suspected COVID-19 virus infection.     Orders (Selected)   Outpatient Orders  Ordered  Return to Clinic (Request): RFV: Sleep apnea compliance visit., Return in 1 months  Return to Clinic (Request): Return in 6 months; A1c + prestanidng DM labs  SARS-CoV-2 RNA (COVID-19), Qualitative NAAT (Request): Suspected COVID-19 virus infection  Prescriptions  Prescribed  1ST TIER UNIFINE PE 32G X 4MM MISC 32 G X 4MM: 1ST TIER UNIFINE PE 32G X 4MM MISC 32 G X 4MM, See Instructions, Instructions: Use to inject as needed., Supply, # 100 EA, 11 Refill(s), Type: Maintenance, Pharmacy: [facility], keep on file and pt will notify when needed, Use to...  Auto Titrating CPAP 6-16 for daily use: Auto Titrating CPAP 6-16 for daily use, See Instructions, Instructions:  Heated humidifier x1; Humidifier chamber x1;  Heated tubing x1; Full face mask of choice with headgear  x1; Cushion x 1;  Filters: Disposable x1pk & Reusable x1pk.  Length of Need...  Cris Contour Next test strips: Cris Contour Next test strips, See Instructions, Instructions: testing 4-8 x/ day, Supply, # 800 EA, 3 Refill(s), Type: Maintenance, Pharmacy: [facility], keep on hold and pt will notify when needed, testing 4-8 x/ day, 65, in,...  Crestor 10 mg oral tablet: = 1 tab(s) ( 10 mg ), po, hs, # 90 tab(s), 3 Refill(s), Type: Maintenance, Pharmacy: [facility], keep on file and pt will notify when needed, 1 tab(s) Oral hs, 65, in, 08/21/20 16:41:00 CDT, Height Measured, 171.8, lb, 08/21/20 1...  NovoLOG 100 units/mL injectable solution: See Instructions, Instructions: 75 units via pump daily, # 90 mL, 1 Refill(s), Type: Maintenance, Pharmacy: [facility], keep on file, 75 units via pump daily, 65, in, 08/21/20 16:41:00 CDT, Height Measured, Weight Measured  aspirin 81 mg oral tablet: 1 tab(s) ( 81 mg ), PO, Daily, # 90 tab(s), 3 Refill(s), Type: Maintenance, Pharmacy: [facility], 1 tab(s) po daily  one touch delica lancets: one touch delica lancets, See Instructions, Instructions: change needle daily-testing up to 8 times daily, Supply, # 400 EA, 3 Refill(s), Type: Maintenance, Pharmacy: [facility], keep on hold and pt will notify when needed, baires...  Documented Medications  Documented  ProstaScint 0.5 mg/mL intravenous solution: 0 Refill(s), Type: Maintenance  lisinopril 10 mg oral tablet: = 1 tab(s) ( 10 mg ), Oral, daily, 0 Refill(s), Type: Maintenance.        Visit Information      Date of Service: 12/15/2020 10:27 am  Performing Location: [facility]  Encounter#: [id number]      Primary Care Provider (PCP):  Tommy Damon MD    NPI# [id number]      Referring Provider:  Salbador De Oliveira MD    NPI# [id number]    Visit type:  Video Visit via Unype.    Participants in room during visit:  _Patient only   Accompanied by:  No one.    Source of history:  Self.    Location of patient:  _home  Location of provider:  _ Clinic  Video Start Time:  _1102  Video End Time:   _1119    Today's visit was conducted via video conference due to the COVID-19 pandemic.  The patient's consent to proceed with a video visit has been obtained and documented.   Referral source:  Self.    History limitation:  None.       Chief Complaint   12/15/2020 10:36 AM CST  consent for video visit for COVID testing        History of Present Illness   Patient is a _45 year old _M who is being evaluated via a billable video visit.    Ethnicity:   Onset of symptoms: 12/13/2020  COVID symptoms:  Fever  Chills  Cough  Dyspnea  Chest Pain  Rhinorrhea  Headache  Myalgias  Fatigue         Review of Systems   ROS negative other than the symptoms described above      Health Status   Allergies:    Allergic Reactions (Selected)  Severity Not Documented  Ampicillin (Rash)  Simvastatin (Myalgias)   Medications:  (Selected)   Prescriptions  Prescribed  1ST TIER UNIFINE PE 32G X 4MM MISC 32 G X 4MM: 1ST TIER UNIFINE PE 32G X 4MM MISC 32 G X 4MM, See Instructions, Instructions: Use to inject as needed., Supply, # 100 EA, 11 Refill(s), Type: Maintenance, Pharmacy: [facility], keep on file and pt will notify when needed, Use to...  Auto Titrating CPAP 6-16 for daily use: Auto Titrating CPAP 6-16 for daily use, See Instructions, Instructions: Heated humidifier x1; Humidifier chamber x1;  Heated tubing x1; Full face mask of choice with headgear  x1; Cushion x 1;  Filters: Disposable x1pk & Reusable x1pk.  Length of Need...  Cris Contour Next test strips: Cris Contour Next test strips, See Instructions, Instructions: testing 4-8 x/ day, Supply, # 800 EA, 3 Refill(s), Type: Maintenance, Pharmacy: [facility], keep on hold and  pt will notify when needed, testing 4-8 x/ day, 65, in,...  Crestor 10 mg oral tablet: = 1 tab(s) ( 10 mg ), po, hs, # 90 tab(s), 3 Refill(s), Type: Maintenance, Pharmacy: [facility], keep on file and pt will notify when needed, 1 tab(s) Oral hs, 65, in, 08/21/20 16:41:00 CDT, Height Measured, 171.8, lb, 08/21/20 1...  NovoLOG 100 units/mL injectable solution: See Instructions, Instructions: 75 units via pump daily, # 90 mL, 1 Refill(s), Type: Maintenance, Pharmacy: [facility], keep on file, 75 units via pump daily, 65, in, 08/21/20 16:41:00 CDT, Height Measured, Weight Measured  aspirin 81 mg oral tablet: 1 tab(s) ( 81 mg ), PO, Daily, # 90 tab(s), 3 Refill(s), Type: Maintenance, Pharmacy: [facility], 1 tab(s) po daily  one touch delica lancets: one touch delica lancets, See Instructions, Instructions: change needle daily-testing up to 8 times daily, Supply, # 400 EA, 3 Refill(s), Type: Maintenance, Pharmacy: [facility], keep on hold and pt will notify when needed, baires...  Documented Medications  Documented  ProstaScint 0.5 mg/mL intravenous solution: 0 Refill(s), Type: Maintenance  lisinopril 10 mg oral tablet: = 1 tab(s) ( 10 mg ), Oral, daily, 0 Refill(s), Type: Maintenance,    Medications          *denotes recorded medication          Cris Contour Next test strips: See Instructions, testing 4-8 x/ day, 800 EA, 3 Refill(s).          Auto Titrating CPAP 6-16 for daily use: See Instructions, Heated humidifier x1; Humidifier chamber x1;  Heated tubing x1; Full face mask of choice with headgear  x1; Cushion x 1;  Filters: Disposable x1pk & Reusable x1pk.  Length of Need = 99 Months, 1 EA, 11 Refill(s).          1ST TIER UNIFINE PE 32G X 4MM MISC 32 G X 4MM: See Instructions, Use to inject as needed., 100 EA, 11 Refill(s).          one touch delica lancets: See Instructions, change needle daily-testing up to 8 times daily, 400 EA, 3 Refill(s).           aspirin 81 mg oral tablet: 81 mg, 1 tab(s), PO, Daily, 90 tab(s).          *ProstaScint 0.5 mg/mL intravenous solution: 0 Refill(s).          NovoLOG 100 units/mL injectable solution: See Instructions, 75 units via pump daily, 90 mL, 1 Refill(s).          *lisinopril 10 mg oral tablet: 10 mg, 1 tab(s), Oral, daily, 0 Refill(s).          Crestor 10 mg oral tablet: 10 mg, 1 tab(s), po, hs, 90 tab(s), 3 Refill(s).       Problem list:    All Problems  Diabetes Type I / ICD-9-.01 / Confirmed  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 / Confirmed  DM neuropathy, type I diabetes mellitus / SNOMED CT 856783627 / Confirmed  Dyslipidemia / ICD-9-.4 / Confirmed  Erectile Dysfunction / ICD-9-.84 / Confirmed  Obstructive sleep apnea syndrome, severe / SNOMED CT 020218642 / Confirmed  Tobacco Abuse-Unspec / ICD-9-.1 / Confirmed      Histories   Past Medical History:    Active  Diabetes Type I (250.01): Onset in the month of 9/1985 at 9 years  Tobacco Abuse-Unspec (305.1)  Resolved  Hospitalized for Diabetes being out of control: Onset in 1991 at 15 years.  Resolved.  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  Comments:  1/13/2014 CST 3:55 PM CST - Arianne HANEY, Nelly  Status post laser therapy (Dr. Sherif Charles)   Family History:    Sleep apnea syndrome  Father  Sister (Tere)  Sister (Melissa)  Psoriasis  Father  Heart attack  Grandfather (P)  Hypercholesterolemia  Father  Seizure  Sister (Melissa)  Sister (Tere)  Angioplasty  Father  Uncle (P)  MI - Myocardial infarction  Uncle (P)  CAD - Coronary artery disease  Grandfather (P)  Father  Uncle (P)     Procedure history:    Colonoscopy (SNOMED CT 462534424) performed by Dalton Lee in the month of 1/2015 at 39 Years.  Comments:  7/1/2015 10:07 AM CDT - Monica WING, Ebonie  Colon, descending, polyp:  1.  Tubular adenoma  2.  No evidence of high grade dsyplasia  3.  Polyp size:5mm(resection/retrievel - complete)  - Repeat in 5yrs   Social History:         Electronic Cigarette/Vaping Assessment            Electronic Cigarette Use: Never.      Alcohol Assessment            Current, Beer (12 oz), 1-2 times per week, 2 drinks/episode average.  3 drinks/episode maximum.      Tobacco Assessment: Current            Current, Cigarettes, 10 per day.            10 or more cigarettes (1/2 pack or more)/day in last 30 days      Employment and Education Assessment            Employed, Work/School description: [employer].        Physical Examination   General:  Alert and oriented, No acute distress.    Eye:  Pupils are equal, round and reactive to light, Extraocular movements are intact, Normal conjunctiva.    Respiratory:  Respirations are non-labored.    Integumentary:  Warm, Dry, Pink.    Neurologic:  Normal motor function.    Psychiatric:  Cooperative, Appropriate mood & affect, Normal judgment, Non-suicidal.         Mood and affect: Calm.         Behavior: Relaxed.         Judgment: Able to make sensible decisions, Appropriate in social situations.         Thought process: Appropriate.       Health Maintenance      Recommendations     Pending (in the next year)        OverDue           DM - Foot Exam due  05/17/14  and every 1  year(s)           DM - Eye Exam due  03/14/20  and every 1  year(s)           Alcohol Misuse Screen (Male) due  03/14/20  and every 1  year(s)           Depression Screen (Male) due  03/14/20  and every 1  year(s)           Influenza Vaccine due  08/31/20  and every 1  year(s)           DM - Microalbumin due  10/03/20  and every 1  year(s)        Due            DM - HgbA1c due  11/17/20  and every 3  month(s)           Coronavirus (COVID-19) Suspected due  12/15/20  and every 1  day(s)           Exercise due  12/15/20  and every 1  year(s)           Preventative Services due  12/15/20  and every 5  year(s)        Due In Future            Lipid Disorders Screen (Male) not due until  08/17/21  and every 1  year(s)           Type 2 Diabetes  Mellitus Screen (Male) not due until  08/17/21  and every 1  year(s)           Body Mass Index Check (Male) not due until  08/21/21  and every 1  year(s)           High Blood Pressure Screen (Male) not due until  08/21/21  and every 1  year(s)     Satisfied (in the past 1 year)        Satisfied            Body Mass Index Check (Male) on  08/21/20.           Body Mass Index Check (Male) on  01/08/20.           DM - HgbA1c on  08/17/20.           High Blood Pressure Screen (Male) on  08/21/20.           High Blood Pressure Screen (Male) on  01/08/20.           Lipid Disorders Screen (Male) on  08/17/20.           Lipid Disorders Screen (Male) on  08/17/20.           Lipid Disorders Screen (Male) on  08/17/20.           Lipid Disorders Screen (Male) on  08/17/20.           Type 2 Diabetes Mellitus Screen (Male) on  08/17/20.

## 2022-02-16 NOTE — PROGRESS NOTES
Seen for COVID testing at [facility] per Dr. De Oliveira    O2 Sat = 94%  (Children under 12 do not require O2 sat)    Specimen sent to:  [facility]    PUI form faxed to [facility].

## 2022-02-16 NOTE — PROGRESS NOTES
Patient:   LIDDLE, CHAD A            MRN: [id number]            FIN: [id number]               Age:   41 years     Sex:  Male     :  1975   Associated Diagnoses:   Pharyngitis; Head cold   Author:   Salbador Gonsalez PA-C      Chief Complaint   2017 10:26 AM CDT   Patient presents with a sore throat, headache and fever x 2 days. Blood sugars have been high. Took 600mg ibuprofen @ 0800 today.      History of Present Illness   Chief complaint and symptoms noted above and confirmed with patient   as above      Review of Systems   Constitutional:  Fever.    Ear/Nose/Mouth/Throat:  Sore throat, No nasal congestion.    Respiratory:  Cough.    Gastrointestinal:  Negative.    Neurologic:  Headache.       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  Cialis 20 mg oral tablet: 1 tab(s) ( 20 mg ), po, daily, # 30 tab(s), 1 Refill(s), Pharmacy: [facility], keep on  file and pt will notify when needed, 1 tab(s) po daily  Crestor 10 mg oral tablet: 1 tab(s) ( 10 mg ), po, hs, # 90 tab(s), 3 Refill(s), Type: Maintenance, Pharmacy: [facility] #[id number], keep on file and pt will notify when needed, 1 tab(s) po hs  Delica One Touch Ultra test Strips: Delica One Touch Ultra test Strips, See Instructions, Instructions: uses 4-5 daily, Supply, # 400 EA, 3 Refill(s), Type: Maintenance, Pharmacy: [facility] #[id number], keep on file and pt will notify when needed, uses 4-5 daily  Lantus Solostar Pen 100 units/mL subcutaneous solution: ( 28 unit(s) ), subcutaneous, hs, x 30 day(s), # 30 mL, 3 Refill(s), Type: Physician Stop, Pharmacy: [facility] #[id number], 28 unit(s) subcutaneous hs,x30 day(s)  NovoLOG FlexPen 100 units/mL subcutaneous solution: See Instructions, Instructions: INJECT 8-10 units with meals, # 30 mL, 3 Refill(s), Pharmacy: [facility] #[id number], keep on file and pt will notify when needed, INJECT 8-10 units with meals  ONE TOUCH ULTRA TEST STRIPS: ONE TOUCH ULTRA TEST STRIPS, See Instructions, Instructions: TESTING 4 TIMES DAILY, Supply, # 400 EA, 3 Refill(s), Type: Maintenance, Pharmacy: [facility], TESTING 4 TIMES DAILY  ONE TOUCH ULTRA TEST STRIPS: ONE TOUCH ULTRA TEST STRIPS, See Instructions, Instructions: TESTS 4 TIMES DAILY, Supply, # 50 EA, 1 Refill(s), Type: Maintenance, Pharmacy: [facility], TESTS 4 TIMES DAILY  aspirin 81 mg oral tablet: 1 tab(s) ( 81 mg ), PO, Daily, # 90 tab(s), 3 Refill(s), Type: Maintenance, Pharmacy: [facility], 1 tab(s) po daily  lisinopril 5 mg oral tablet: 1 tab(s) ( 5 mg ), PO, Daily, # 90 tab(s), 3 Refill(s), Type: Maintenance, Pharmacy: [facility] #[id number], keep on file and pt will notify when needed, 1 tab(s) po daily  Documented Medications  Documented  ibuprofen: 0 Refill(s), Type: Maintenance      Histories   Past Medical History:    Active  Diabetes Type I (250.01): Onset in the month of 9/1985 at 9  years  Tobacco Abuse-Unspec (305.1)  Resolved  Hospitalized for Diabetes being out of control: Onset in 1991 at 15 years.  Resolved.  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  Comments:  1/13/2014 CST 3:55 PM CST - Nelly Acuña MD  Status post laser therapy (Dr. Sherif Charles)   Family History:    Psoriasis  Father  Heart attack  Grandfather (P)  Hypercholesterolemia  Father  Seizure  Sister (Melissa)  Sister (Tere)  Angioplasty  Father  Uncle (P)  MI - Myocardial infarction  Uncle (P)  CAD - Coronary artery disease  Grandfather (P)  Father  Uncle (P)     Procedure history:    Colonoscopy (423273782) in the month of 1/2015 at 39 Years.  Comments:  7/1/2015 10:07 AM - Ebonie Anderson CMA  Colon, descending, polyp:  1.  Tubular adenoma  2.  No evidence of high grade dsyplasia  3.  Polyp size:5mm(resection/retrievel - complete)  - Repeat in 5yrs   Social History:        Alcohol Assessment            Current, Beer (12 oz), 1-2 times per week, 2 drinks/episode average.  3 drinks/episode maximum.      Tobacco Assessment: Current            Current, Cigarettes, 10 per day.      Employment and Education Assessment            Employed, Work/School description: [employer].        Physical Examination   Vital Signs   8/28/2017 10:26 AM CDT Temperature Tympanic 97.9 DegF    Peripheral Pulse Rate 79 bpm    Systolic Blood Pressure 110 mmHg    Diastolic Blood Pressure 62 mmHg    Mean Arterial Pressure 78 mmHg    BP Site Right arm    BP Method Manual    Oxygen Saturation 93 %  LOW      Measurements from flowsheet : Measurements   8/28/2017 10:26 AM CDT Height Measured - Standard 65 in    Weight Measured - Standard 171 lb    BSA 1.88 m2    Body Mass Index 28.45 kg/m2      General:  Mild distress.    HENT:  Tympanic membranes are clear, nares are patent, maxillary sinus tenderness, oropharynx is moderately enlarged and inflamed without exudate.    Neck:  Supple, mild anterior cervical lymphadenopathy, moderate tenderness.     Respiratory:  Lungs are clear to auscultation.       Review / Management   Results review:       Interpretation: rapid strep is negative; culture pending, will call if abnormal results..       Impression and Plan   Diagnosis     Pharyngitis (VHL44-VW J02.9).     Head cold (WZQ40-YT J00).     Summary:  Fluids, salt water gargles, popsicles,  throat lozenges, NSAIDS; follow up if not improving.    Orders     Orders   Lab (Gen Lab  Reference Lab):  POC, GROUP A STREP* ([facility]) (Order): Specimen Type: Swab, Collection Date: 8/28/2017 10:44 AM CDT.     Orders   Pharmacy:  Atrovent 42 mcg/inh nasal spray (Prescribe): 2 spray(s), nasal, qid, PRN: for nasal congestion, # 1 EA, 2 Refill(s), Type: Maintenance, Pharmacy: [facility], 2 spray(s) nasal qid,PRN:for nasal congestion.     Orders   Charges (Evaluation and Management):  [id number] office outpatient visit 15 minutes (Charge) (Order): Quantity: 1, Pharyngitis  Head cold.

## 2022-02-16 NOTE — TELEPHONE ENCOUNTER
---------------------  From: Albert Dozier (Phone Messages Pool ([id number]_[facility]))   To: M Message Pool ([id number]_[place]);     Sent: 4/28/2020 12:13:17 PM CDT  Subject: appt today.      Pt had an appt at 11am today for a telemedicine. Called back.

## 2022-02-16 NOTE — NURSING NOTE
Comprehensive Intake Entered On:  11/26/2019 9:05 AM CST    Performed On:  11/26/2019 9:02 AM CST by Ilana Ibanez CMA               Summary   Chief Complaint :   c/o right flank pain x 1 week with constipation and change of stool color   Weight Measured :   182 lb(Converted to: 182 lb 0 oz, 82.55 kg)    Height Measured :   65 in(Converted to: 5 ft 5 in, 165.10 cm)    Body Mass Index :   30.28 kg/m2 (HI)    Body Surface Area :   1.94 m2   Systolic Blood Pressure :   124 mmHg   Diastolic Blood Pressure :   70 mmHg   Mean Arterial Pressure :   88 mmHg   Peripheral Pulse Rate :   78 bpm   Temperature Tympanic :   97.6 DegF(Converted to: 36.4 DegC)  (LOW)    Ilana Ibanez CMA - 11/26/2019 9:02 AM CST   Health Status   Allergies Verified? :   Yes   Medication History Verified? :   Yes   Immunizations Current :   Yes   Pre-Visit Planning Status :   Completed   Tobacco Use? :   Current every day smoker   Ilana Ibanez CMA - 11/26/2019 9:02 AM CST   Consents   Consent for Immunization Exchange :   Consent Granted   Consent for Immunizations to Providers :   Consent Granted   Ilana Ibanez CMA - 11/26/2019 9:02 AM CST   Meds / Allergies   (As Of: 11/26/2019 9:05:26 AM CST)   Allergies (Active)   ampicillin  Estimated Onset Date:   Unspecified ; Reactions:   Rash ; Created By:   Malika Wilson CMA; Reaction Status:   Active ; Category:   Drug ; Substance:   ampicillin ; Type:   Allergy ; Updated By:   Malika Wilson CMA; Reviewed Date:   11/8/2019 1:23 PM CST      simvastatin  Estimated Onset Date:   Unspecified ; Reactions:   myalgias ; Created By:   Srinivas Brown MD; Reaction Status:   Active ; Category:   Drug ; Substance:   simvastatin ; Type:   Allergy ; Updated By:   Srinivas Brown MD; Reviewed Date:   11/8/2019 1:23 PM CST        Medication List   (As Of: 11/26/2019 9:05:26 AM CST)   Prescription/Discharge Order    aspirin  :   aspirin ; Status:   Prescribed ; Ordered As Mnemonic:    aspirin 81 mg oral tablet ; Simple Display Line:   81 mg, 1 tab(s), PO, Daily, 90 tab(s) ; Ordering Provider:   Nelly Acuña MD; Catalog Code:   aspirin ; Order Dt/Tm:   1/10/2014 2:24:22 PM CST          insulin aspart  :   insulin aspart ; Status:   Prescribed ; Ordered As Mnemonic:   NovoLOG 100 units/mL injectable solution ; Simple Display Line:   See Instructions, 75 units via pump daily, 70 mL, 3 Refill(s) ; Ordering Provider:   Tommy Damon MD; Catalog Code:   insulin aspart ; Order Dt/Tm:   10/3/2019 3:44:53 PM CDT          lisinopril  :   lisinopril ; Status:   Prescribed ; Ordered As Mnemonic:   lisinopril 10 mg oral tablet ; Simple Display Line:   10 mg, 1 tab(s), PO, Daily, 90 tab(s), 3 Refill(s) ; Ordering Provider:   Tommy Damon MD; Catalog Code:   lisinopril ; Order Dt/Tm:   10/3/2019 3:44:56 PM CDT          Miscellaneous Prescription  :   Miscellaneous Prescription ; Status:   Prescribed ; Ordered As Mnemonic:   Cris Contour Next test strips ; Simple Display Line:   See Instructions, testing 4-8 x/ day, 800 EA, 3 Refill(s) ; Ordering Provider:   Tommy Damon MD; Catalog Code:   Miscellaneous Prescription ; Order Dt/Tm:   10/3/2019 3:44:54 PM CDT          Miscellaneous Rx Supply  :   Miscellaneous Rx Supply ; Status:   Prescribed ; Ordered As Mnemonic:   1ST TIER UNIFINE PE 32G X 4MM MISC 32 G X 4MM ; Simple Display Line:   See Instructions, Use to inject as needed., 100 EA, 11 Refill(s) ; Ordering Provider:   Tommy Damon MD; Catalog Code:   Miscellaneous Rx Supply ; Order Dt/Tm:   10/3/2019 3:44:55 PM CDT          Miscellaneous Rx Supply  :   Miscellaneous Rx Supply ; Status:   Prescribed ; Ordered As Mnemonic:   Auto Titrating CPAP 6-16 for daily use ; Simple Display Line:   See Instructions, Heated humidifier x1; Humidifier chamber x1;  Heated tubing x1; Full face mask of choice with headgear  x1; Cushion x 1;  Filters: Disposable x1pk & Reusable x1pk.  Length of Need = 99 Months, 1 EA, 11 Refill(s) ;  Ordering Provider:   Alexander Melgar MD; Catalog Code:   Miscellaneous Rx Supply ; Order Dt/Tm:   11/8/2019 1:34:17 PM CST          Miscellaneous Rx Supply  :   Miscellaneous Rx Supply ; Status:   Prescribed ; Ordered As Mnemonic:   one touch delica lancets ; Simple Display Line:   See Instructions, change needle daily, 3 box(es), 3 Refill(s) ; Ordering Provider:   Tommy Damon MD; Catalog Code:   Miscellaneous Rx Supply ; Order Dt/Tm:   10/3/2019 3:44:56 PM CDT          rosuvastatin  :   rosuvastatin ; Status:   Prescribed ; Ordered As Mnemonic:   Crestor 10 mg oral tablet ; Simple Display Line:   10 mg, 1 tab(s), po, hs, 90 tab(s), 3 Refill(s) ; Ordering Provider:   Tommy Damon MD; Catalog Code:   rosuvastatin ; Order Dt/Tm:   10/3/2019 3:44:57 PM CDT            Home Meds    sildenafil  :   sildenafil ; Status:   Documented ; Ordered As Mnemonic:   Viagra 100 mg oral tablet ; Simple Display Line:   See Instructions, 1 tab(s)   1 hour before sexual activity, 6 tab(s), 0 Refill(s) ; Catalog Code:   sildenafil ; Order Dt/Tm:   9/25/2017 12:11:22 PM CDT

## 2022-02-16 NOTE — TELEPHONE ENCOUNTER
---------------------  From: Rosina West LPN (Phone Messages Pool ([id number]_[facility]))   Sent: 8/5/2020 4:34:45 PM CDT  Subject: insulin/appt     Phone Message    PCP:   NAHUN      Time of Call:  3:39pm       Person Calling:  pt  Phone number:  [phone]    Returned call at: 4:28pm    Note:   Pt LM stating he needs to do fasting labs but there are no early morning appts. Pt asking what to do. Pt also says he needs to order insulin tomorrow and needs Rx sent.    Spoke with scheduling- early morning appts are available the week of 8/17. Called pt back and informed him of this. Told pt small supply of insulin will be sent. Pt says he wants the full 3 months so that he does not have to pay a copay. Told pt it will be sent but he needs to keep appts.    Transferred to scheduling    Last office visit and reason:  4/28/20 Telephone Encounter

## 2022-02-16 NOTE — PROGRESS NOTES
Patient:   LIDDLE, CHAD A            MRN: [id number]            FIN: [id number]               Age:   41 years     Sex:  Male     :  1975   Associated Diagnoses:   Uncontrolled type I diabetes mellitus with proliferative retinopathy; Tobacco Abuse-Unspec; Erectile dysfunction   Author:   Tommy Damon MD      Visit Information      Date of Service: 2017 04:30 pm  Performing Location: [facility]  Encounter#: [id number]      Primary Care Provider (PCP):  Tommy Damon MD    NPI# [id number]      Referring Provider:  Tommy Damon MD# [id number]      Chief Complaint   2017 4:49 PM CDT    DM check              Additional Information:No additional information recorded during visit.   Chief complaint and symptoms as noted above and confirmed with patient.  Recent lab and diagnostic studies reviewed with patient      History of Present Illness   2014: Presents to clinic to establish care, previously seen by [facility]; though more recently transferred his care at [facility], now wishing to return care more locally.  He has a long-time history of type 1 diabetes, generally with a fairly noncompliant course.  He uses Lantus 28 units daily at bedtime and NovoLog 10-14 units per meal, though never checking pre-meal blood sugar before insulin bolus.  He s had issues with admissions for DKA in the past.  He is interested in potentially pursuing insulin pump.  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  He continues to smoke on a daily basis.  He s been experiencing upper left-sided chest and shoulder discomfort that is not related to exertion.  He s also been experiencing abdominal pains for the last several days, typical of what is experienced with diabetic ketoacidosis in the past.    2017: Returns to clinic feeling well.  No recent diabetic changes; using  spreadsheet provided; denies any hypoglycemia.  Still issues with decipline checking BS and taking insulin.  Knows he can do better.  Does express genuine interest in insulin pump use.  Erectile function significantly worse; now no real erection despite cialis 20mg use.        Review of Systems   Constitutional:  No fever, No chills.    Eye:  Negative except as documented in history of present illness.    Ear/Nose/Mouth/Throat:  Negative except as documented in history of present illness.    Respiratory:  No shortness of breath.    Cardiovascular:  No palpitations, No peripheral edema, No syncope.    Gastrointestinal:  No nausea, No vomiting.    Genitourinary:  No dysuria, No hematuria.    Hematology/Lymphatics:  Negative except as documented in history of present illness.    Endocrine:  No excessive thirst, No polyuria.    Immunologic:  No recurrent fevers.    Musculoskeletal:  No joint pain, No muscle pain.    Neurologic:  Alert and oriented X4, No numbness, No tingling, No headache.       Health Status   Allergies:    Allergic Reactions (Selected)  Severity Not Documented  Ampicillin (Rash)  Simvastatin (Myalgias)   Medications:  (Selected)   Prescriptions  Prescribed  1ST TIER UNIFINE PE 32G X 4MM MISC 32 G X 4MM: 1ST TIER UNIFINE PE 32G X 4MM MISC 32 G X 4MM, See Instructions, Instructions: Use to inject as needed., Supply, # 100 EA, 11 Refill(s), Type: Maintenance, Pharmacy: [facility], keep on file and pt will notify when needed, Use to...  Cialis 20 mg oral tablet: 1 tab(s) ( 20 mg ), po, daily, # 30 tab(s), 1 Refill(s), Pharmacy: [facility], keep on file and pt will notify when needed, 1 tab(s) po daily  Crestor 10 mg oral tablet: 1 tab(s) ( 10 mg ), po, hs, # 90 tab(s), 3 Refill(s), Type: Maintenance, Pharmacy: [facility], 1 tab(s) po hs  Delica One Touch Ultra test Strips: Delica One Touch Ultra test Strips, See Instructions, Instructions:  uses 4-5 daily, Supply, # 400 EA, 3 Refill(s), Type: Maintenance, Pharmacy: [facility], keep on file and pt will notify when needed, uses 4-5 daily  Lantus Solostar Pen 100 units/mL subcutaneous solution: ( 28 unit(s) ), subcutaneous, hs, x 30 day(s), # 10 mL, 0 Refill(s), Type: Physician Stop, Pharmacy: [facility] #[id number], 28 unit(s) subcutaneous hs,x30 day(s)  NovoLOG FlexPen 100 units/mL subcutaneous solution: See Instructions, Instructions: INJECT 8-10 units with meals, # 30 mL, 1 Refill(s), Pharmacy: [facility], keep on file and pt will notify when needed, INJECT 8-10 units with meals  ONE TOUCH ULTRA TEST STRIPS: ONE TOUCH ULTRA TEST STRIPS, See Instructions, Instructions: TESTING 4 TIMES DAILY, Supply, # 400 EA, 3 Refill(s), Type: Maintenance, Pharmacy: [facility], TESTING 4 TIMES DAILY  ONE TOUCH ULTRA TEST STRIPS: ONE TOUCH ULTRA TEST STRIPS, See Instructions, Instructions: TESTS 4 TIMES DAILY, Supply, # 50 EA, 1 Refill(s), Type: Maintenance, Pharmacy: [facility], TESTS 4 TIMES DAILY  aspirin 81 mg oral tablet: 1 tab(s) ( 81 mg ), PO, Daily, # 90 tab(s), 3 Refill(s), Type: Maintenance, Pharmacy: [facility], 1 tab(s) po daily  lisinopril 5 mg oral tablet: 1 tab(s) ( 5 mg ), PO, Daily, # 90 tab(s), 3 Refill(s), Type: Maintenance, Pharmacy: [facility], 1 tab(s) po daily  Documented Medications  Documented  ibuprofen: 0 Refill(s), Type: Maintenance   Problem list:    All Problems  Diabetes Type I / ICD-9-.01 / Confirmed  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 / Confirmed  Dyslipidemia / ICD-9-.4 / Confirmed  Erectile Dysfunction / ICD-9-.84 / Confirmed  Tobacco Abuse-Unspec / ICD-9-.1 / Confirmed  Inactive: GERD (Gastroesophageal Reflux Disease) / ICD-9-.81  Inactive: history of diabeteic ketoacidosis  Resolved: Hospitalized for Diabetes being out of control  Resolved: Retinopathy due to  Secondary Diabetes / ICD-9-.50  Canceled: Dyslipidemia / ICD-9-.4  Canceled: Diabetes mellitus type I / SNOMED CT [id number]      Histories   Past Medical History:    Active  Diabetes Type I (250.01): Onset in the month of 9/1985 at 9 years  Tobacco Abuse-Unspec (305.1)  Resolved  Hospitalized for Diabetes being out of control: Onset in 1991 at 15 years.  Resolved.  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  Comments:  1/13/2014 CST 3:55 PM CST - Arianne HANEY, Nelly  Status post laser therapy (Dr. Sherif Charles)   Family History:    Psoriasis  Father  Heart attack  Grandfather (P)  Hypercholesterolemia  Father  Seizure  Sister (Melissa)  Sister (Tere)  Angioplasty  Father  Uncle (P)  MI - Myocardial infarction  Uncle (P)  CAD - Coronary artery disease  Grandfather (P)  Father  Uncle (P)     Procedure history:    Colonoscopy ([id number]) in the month of 1/2015 at 39 Years.  Comments:  7/1/2015 10:07 AM - Monica WING, Ebonie  Colon, descending, polyp:  1.  Tubular adenoma  2.  No evidence of high grade dsyplasia  3.  Polyp size:5mm(resection/retrievel - complete)  - Repeat in 5yrs   Social History:        Alcohol Assessment            Current, Beer (12 oz), 1-2 times per week, 2 drinks/episode average.  3 drinks/episode maximum.      Tobacco Assessment: Current            Current, Cigarettes, 10 per day.      Employment and Education Assessment            Employed, Work/School description: [employer].        Physical Examination   vital signs stable, as noted above   Vital Signs   8/24/2017 4:49 PM CDT Temperature Tympanic 97.3 DegF  LOW    Peripheral Pulse Rate 72 bpm    Pulse Site Radial artery    Systolic Blood Pressure 130 mmHg    Diastolic Blood Pressure 80 mmHg    Mean Arterial Pressure 97 mmHg    BP Site Right arm      Measurements from flowsheet : Measurements   8/24/2017 4:49 PM CDT    Weight Measured - Standard                172 lb     General:  Alert and oriented, No acute distress.     Eye:  Extraocular movements are intact.    HENT:  Tympanic membranes are clear, No pharyngeal erythema.    Respiratory:  Lungs are clear to auscultation, Respirations are non-labored.    Cardiovascular:  Regular rhythm, No edema.    Gastrointestinal:  Soft, Non-distended, Normal bowel sounds.    Neurologic:  Alert, Oriented, Normal motor function, No focal deficits, monofilament testing normal.    Cognition and Speech:  Oriented, Speech clear and coherent.    Psychiatric:  Appropriate mood & affect.       Review / Management   Results review      Impression and Plan   Diagnosis     Tobacco Abuse-Unspec (VKO03-KE Z72.0).     Uncontrolled type I diabetes mellitus with proliferative retinopathy (FRB42-NG E10.359).     Erectile dysfunction (QJA15-QE N52.9).         .) type I Dm, uncontrolled  hypoglycemic medications: none  insulin therapy: Lantus 28 units qhs, Novolog 8-12 units qmeal   - not following any ICRs, working on carb counting   - provided spreadsheet for prandial dosing; ICR 1:5, sensitivity 20mg/dL   - referring back to Sarah Gannon - I'd like to more seriously consider insulin pump options  last HbA1c: 9.9% (4/2016)   microvascular complications: proliferative retinopathy, + DOROTHY  macrovascular complications: none known  ASA/AIDEN/statin:  ASA 81mg daily, lisinopril 5mg daily, rosuvastatin 10mg daily   - smoking cessation counseling    .) erectile dysfunction - almost certainly related to diabetic neuropathy/vascular disease   - will refer to urology to discuss further therapy options    .) weight gains   - lifestyle modification     RTC in 4 months         Professional Services   Counseling Summary:  This was a 25 minute visit with greater than 50% of that time spent counseling the patient.

## 2022-02-16 NOTE — PROGRESS NOTES
Patient:   LIDDLE, CHAD A            MRN: [id number]            FIN: [id number]               Age:   43 years     Sex:  Male     :  1975   Associated Diagnoses:   Diabetes Type I; Dyslipidemia   Author:   Sindhu Gannon      Visit Information   Visit type:  Diabetes Self Management Education .    Referral source:  Nelson HANEY, Tommy.       Chief Complaint   Uncontrolled DM Type I       History of Present Illness   Pt has had TID for >30 years.  He is here today to start the medtronic 670G sensor which he received ~1 year ago, unfortunately transmitter is non functioning and needs to be replaced due to lifespan of transmitter is ~ 1 year.  Assisted pt with how to request new transmitter and fortunately recent upgrade to transmitter which he will receive in the next 3-4 months.  Overall pt does like the pump and prefers it over MDI       Insulin pump downloaded    Avg  +/- 138 mg/dL   Testing avg of 1.1x/ day     Total Daily Dose 43u  Bolus amount (per day) 18u (42%)  Basal amount (per day) 25u (58%)    ICR= 12:00 4    Sensitivity= 25    Target 120 - 120     Basal: = 25.2u/ day changed to 27.675    MN- 1.05   MN  0.975         05:00 1.20    Insulin action 3 hours  Max Bolus  20u  Routine DM care: eye exams with Charles -due and has had laser treatments related to retinopathy, feet is aware to be cautious and wears steel toe shoes at work and well insulated boots in the winter, dental exam due, skin no concerns.    Exercise: no set routine but tries not to be sedentary  Stress: mod/ high work/ life balance       Health Status   Allergies:    Allergic Reactions (Selected)  Severity Not Documented  Ampicillin (Rash)  Simvastatin (Myalgias)   Medications:  (Selected)   Prescriptions  Prescribed  1ST TIER UNIFINE PE 32G X 4MM MISC 32 G X 4MM: 1ST TIER UNIFINE PE 32G X 4MM MISC 32 G X 4MM, See Instructions, Instructions: Use to inject as needed., Supply, # 100 EA, 11 Refill(s), Type: Maintenance, Pharmacy: [facility], keep on file and pt will notify when needed, Use to...  Cris Contour Next test strips: Cris Contour Next test strips, See Instructions, Instructions: testing 4-8 x/ day, Supply, # 800 EA, 3 Refill(s), Type: Maintenance, Pharmacy: [facility], testing 4-8 x/ day  Crestor 10 mg oral tablet: = 1 tab(s) ( 10 mg ), po, hs, # 90 tab(s), 3 Refill(s), Type: Maintenance, Pharmacy: [facility], keep on file and pt will notify when needed, 1 tab(s) Oral hs  NovoLOG 100 units/mL injectable solution: See Instructions, Instructions: 75 units via pump daily, # 70 mL, 3 Refill(s), Type: Maintenance, Pharmacy: [facility], 75 units via pump daily  aspirin 81 mg oral tablet: 1 tab(s) ( 81 mg ), PO, Daily, # 90 tab(s), 3 Refill(s), Type: Maintenance, Pharmacy: [facility], 1 tab(s) po daily  lisinopril 10 mg oral tablet: = 1 tab(s) ( 10 mg ), PO, Daily, # 90 tab(s), 3 Refill(s), Type: Maintenance, Pharmacy: [facility], 1 tab(s) Oral daily  one touch delica lancets: one touch delica lancets, See Instructions, Instructions: change needle daily, Supply, # 3 box(es), 3 Refill(s), Type: Maintenance, Pharmacy: [facility], change needle daily  Documented Medications  Documented  Viagra 100 mg oral tablet: See Instructions, Instructions: 1 tab(s)   1 hour before sexual activity, # 6 tab(s), 0 Refill(s), Type: Maintenance, Written Rx per Dr Prater  ibuprofen: 0 Refill(s), Type: Maintenance   Problem list:    All Problems  Diabetes Type I / ICD-9-.01 / Confirmed  DM neuropathy, type I diabetes mellitus / SNOMED CT 308000750 / Confirmed  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 / Confirmed  Dyslipidemia / ICD-9-.4 / Confirmed  Erectile Dysfunction / ICD-9-.84 / Confirmed  Tobacco Abuse-Unspec / ICD-9-.1 / Confirmed  Inactive: GERD (Gastroesophageal Reflux Disease) / ICD-9-.81  Inactive:  history of diabeteic ketoacidosis  Resolved: Hospitalized for Diabetes being out of control  Resolved: Retinopathy due to Secondary Diabetes / ICD-9-.50  Canceled: Dyslipidemia / ICD-9-.4  Canceled: Diabetes mellitus type I / SNOMED CT 499737178      Histories   Past Medical History:    Active  Diabetes Type I (250.01): Onset in the month of 9/1985 at 9 years  Tobacco Abuse-Unspec (305.1)  Resolved  Hospitalized for Diabetes being out of control: Onset in 1991 at 15 years.  Resolved.  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  Comments:  1/13/2014 CST 3:55 PM CST - Arianne HANEY, Nelly  Status post laser therapy (Dr. Sherif Charles)   Family History:    Psoriasis  Father  Heart attack  Grandfather (P)  Hypercholesterolemia  Father  Seizure  Sister (Melissa)  Sister (Tere)  Angioplasty  Father  Uncle (P)  MI - Myocardial infarction  Uncle (P)  CAD - Coronary artery disease  Grandfather (P)  Father  Uncle (P)     Procedure history:    Colonoscopy (SNOMED CT 377072057) performed by Dalton Lee in the month of 1/2015 at 39 Years.  Comments:  7/1/2015 10:07 AM CDT - Monica WING, Ebonie  Colon, descending, polyp:  1.  Tubular adenoma  2.  No evidence of high grade dsyplasia  3.  Polyp size:5mm(resection/retrievel - complete)  - Repeat in 5yrs   Social History:        Alcohol Assessment            Current, Beer (12 oz), 1-2 times per week, 2 drinks/episode average.  3 drinks/episode maximum.      Tobacco Assessment: Current            Current, Cigarettes, 10 per day.      Employment and Education Assessment            Employed, Work/School description: [employer].      Physical Examination   VS/Measurements      Health Maintenance      Recommendations     Pending (in the next year)        OverDue           DM - Foot Exam due  05/17/14  and every 1  year(s)           DM - Eye Exam due  12/20/14  and every 1  year(s)           Alcohol Misuse Screen (Male) due  12/16/17  and every 1  year(s)            Depression Screen (Male) due  12/16/17  and every 1  year(s)           Body Mass Index Check (Male) due  11/06/18  and every 1  year(s)           DM - HgbA1c due  12/28/18  and every 3  month(s)        Due            Lipid Disorders Screen (Male) due  02/12/19  and every 1  year(s)           Exercise due  02/25/19  and every 1  year(s)           Preventative Services due  02/25/19  and every 5  year(s)        Due In Future            DM - Microalbumin not due until  09/28/19  and every 1  year(s)           Type 2 Diabetes Mellitus Screen (Male) not due until  09/28/19  and every 1  year(s)           High Blood Pressure Screen (Male) not due until  10/05/19  and every 1  year(s)           Influenza Vaccine not due until  10/05/19  and every 1  year(s)     Satisfied (in the past 1 year)        Satisfied            DM - HgbA1c on  09/28/18.           DM - Microalbumin on  09/28/18.           DM - Microalbumin on  09/28/18.           High Blood Pressure Screen (Male) on  10/05/18.           High Blood Pressure Screen (Male) on  09/28/18.           Influenza Vaccine on  10/05/18.           Type 2 Diabetes Mellitus Screen (Male) on  09/28/18.      Review / Management   Results review      Impression and Plan   Diagnosis     Diabetes Type I (NMR14-BT E10.9).     Dyslipidemia (NUZ78-ER E78.5).       Reviewed and adjusted pump settings (basal/bolus), menu options and use, bolus wizard, suspend, temp basal, BG testing recommendations, hypo/hyperglycemia guidelines, infusion set changes and fill and DKA prevention.  Insulin pump insertion completed successfully.      ICR= 12:00 4    Sensitivity= 25    Target 120 - 120     Basal: = 25.2u/ day changed to 27.675    MN- 1.05   MN  0.975         05:00 1.20    Insulin action 3 hours  Max Bolus  20u     Goals:   1.  Practice healthy stress management and get good quality sleep with the goal of 7-8 hours per night.    2.   Physical activity: Recommend 30 min of physical activity on 5  or more days/ week.    3.  Eat in a healthy way, per diabetic plate method.  Nutrition reference: Eat 3 meals a day; snacks are optional.  A meal is three or more food groups; make it colorful for better nutrition.    4. Count carbohydrates and use bolus wizard for all meals and snacks   5.  BG testing 3-4+x/ day initially with pump start Goal pre meals 80 - 120; 2 hrs after meals 80 - 150   6.  Be aware of s/sx of hypo/ hyperglycemia and follow treatment protocol   7.  Always have back up pump supplies on hand and vial/ syringe with battery and glucose tablets   8.  Await new transmitter, follow up in ~3 mo for sensor start            Professional Services   Time spent with pt 60 min   cc Dr. DASHA Damon

## 2022-02-16 NOTE — NURSING NOTE
Comprehensive Intake Entered On:  4/28/2020 1:38 PM CDT    Performed On:  4/28/2020 1:36 PM CDT by Ebonie Anderson CMA               Summary   Chief Complaint :   f/u chronic disease - verbal consent given for telemed visit   Height Measured :   65 in(Converted to: 5 ft 5 in, 165.10 cm)    Ebonie Anderson CMA - 4/28/2020 1:36 PM CDT   Health Status   Allergies Verified? :   Yes   Medication History Verified? :   Yes   Immunizations Current :   Yes   Medical History Verified? :   Yes   Pre-Visit Planning Status :   Completed   Tobacco Use? :   Current every day smoker   Eobnie Anderson CMA - 4/28/2020 1:36 PM CDT

## 2022-02-16 NOTE — PROGRESS NOTES
Patient:   LIDDLE, CHAD A            MRN: [id number]            FIN: [id number]               Age:   45 years     Sex:  Male     :  1975   Associated Diagnoses:   Tobacco Abuse-Unspec; Uncontrolled type I diabetes mellitus with proliferative retinopathy; Erectile dysfunction; Obstructive sleep apnea   Author:   Tommy Damon MD      Visit Information      Date of Service: 2021 10:26 am  Performing Location: [facility]  Encounter#: [id number]      Primary Care Provider (PCP):  Tommy Damon MD    NPI# [id number]      Referring Provider:  Tommy Damon MD    NPI# [id number]      Chief Complaint   2021 10:40 AM CDT   Started not feeling well last nighht - cramping, lightheaded/nauseated, achy, h/a, fatigue - ? heat related            Additional Information:No additional information recorded during visit.   Chief complaint and symptoms as noted above and confirmed with patient.  Recent lab and diagnostic studies reviewed with patient      History of Present Illness   2014: Presents to clinic to establish care, previously seen by [facility]; though more recently transferred his care at [facility], now wishing to return care more locally.  He has a long-time history of type 1 diabetes, generally with a fairly noncompliant course.  He uses Lantus 28 units daily at bedtime and NovoLog 10-14 units per meal, though never checking pre-meal blood sugar before insulin bolus.  He s had issues with admissions for DKA in the past.  He is interested in potentially pursuing insulin pump.  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  He continues to smoke on a daily basis.  He s been experiencing upper left-sided chest and shoulder discomfort that is not related to exertion.  He s also been experiencing abdominal pains for the last several days, typical of what is experienced  with diabetic ketoacidosis in the past.    10/3/2019: Taye returns to clinic related to his type 1 diabetes.  Overall doing well.  Last seen by Sarah in July and some changes made to pump settings.  Describes more unpredictable work schedule which has had some more lability on his blood sugars.  Has been working with urology and finding self penile injections to be quite helpful with his erectile dysfunction.  Brings up concerns related to daytime somnolence and heavy observed snoring by his wife.  Strong history of sleep apnea in the family.    1/8/2020:  Returns with ~6 week history of persistent RLQ abdominal pains, general bloating.  He was seen in clinic on 11/26 for same reasons, CT A/P obtained at that time without any acute abnormalities.  He's tried regular fiber and eliminated gluten from diet with some symptom improvement. He's still bothered by residual pains.     8/21/2020: Taye returns for follow-up.  Overall doing okay with his diabetes.  No longer using sensor on a regular basis.  He found to be more of a hassle with excessive alarming overnight.  He is reverted back to just using pump only which he prefers.  Brings up an episode back in late February where he describes his whole household having febrile respiratory illness.  He said several people at work became seriously sick including need for hospitalization.  He feels that the symptoms are most consistent with underlying coronavirus.  We did discuss potential merits of antibody testing.  He would like to check with insurance before pursuing.  He shares his oldest son just graduated from high school and enlisting in the [employer] and planning on submarine deployment.  6/11/2021: Taye presents to clinic with severe lightheadedness muscle cramping, nausea, mild disorientation that he noticed last night.  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  Ambient temperatures inside are well above 100 degrees.  Typically does have access to an  air conditioned office.  Has been trying to drink plenty of water.  Urinating still without issues.  Appetite has been diminished though still has been eating light snacks this morning felt very lightheaded and dizzy and did not feel appropriate on driving to work.         Review of Systems   Constitutional:  Weakness, Fatigue, No fever, No chills.    Eye:  Negative except as documented in history of present illness.    Ear/Nose/Mouth/Throat:  Negative except as documented in history of present illness.    Respiratory:  No shortness of breath.    Cardiovascular:  No palpitations, No peripheral edema, No syncope.    Gastrointestinal:  Nausea, No vomiting, No abdominal pain.    Genitourinary:  ED, No dysuria, No hematuria, No change in urine stream.    Hematology/Lymphatics:  Negative except as documented in history of present illness.    Endocrine:  No excessive thirst, No polyuria.    Immunologic:  No recurrent fevers.    Musculoskeletal:  Neck pain, No joint pain, No muscle pain.    Neurologic:  Alert and oriented X4, Confusion, Numbness, No tingling, No headache.    Psychiatric:  No depression.       Health Status   Allergies:    Allergic Reactions (Selected)  Severity Not Documented  Ampicillin (Rash)  Simvastatin (Myalgias)   Medications:  (Selected)   Prescriptions  Prescribed  1ST TIER UNIFINE PE 32G X 4MM MISC 32 G X 4MM: 1ST TIER UNIFINE PE 32G X 4MM MISC 32 G X 4MM, See Instructions, Instructions: Use to inject as needed., Supply, # 100 EA, 11 Refill(s), Type: Maintenance, Pharmacy: [facility], keep on file and pt will notify when needed, Use to...  Auto Titrating CPAP 6-16 for daily use: Auto Titrating CPAP 6-16 for daily use, See Instructions, Instructions: Heated humidifier x1; Humidifier chamber x1;  Heated tubing x1; Full face mask of choice with headgear  x1; Cushion x 1;  Filters: Disposable x1pk & Reusable x1pk.  Length of Need...  Cris Contour Next test strips: Cris Contour  Next test strips, See Instructions, Instructions: testing 4-8 x/ day, Supply, # 800 EA, 3 Refill(s), Type: Maintenance, Pharmacy: [facility], keep on hold and pt will notify when needed, testing 4-8 x/ day, 65, in,...  Crestor 10 mg oral tablet: = 1 tab(s) ( 10 mg ), po, hs, # 90 tab(s), 3 Refill(s), Type: Maintenance, Pharmacy: [facility], keep on file and pt will notify when needed, 1 tab(s) Oral hs, 65, in, 08/21/20 16:41:00 CDT, Height Measured, 171.8, lb, 08/21/20 1...  NovoLOG 100 units/mL injectable solution: See Instructions, Instructions: 75 units via pump daily, # 90 mL, 1 Refill(s), Type: Maintenance, Pharmacy: [facility], keep on file, 75 units via pump daily, 65, in, 08/21/20 16:41:00 CDT, Height Measured, Weight Measured  aspirin 81 mg oral tablet: 1 tab(s) ( 81 mg ), PO, Daily, # 90 tab(s), 3 Refill(s), Type: Maintenance, Pharmacy: [facility], 1 tab(s) po daily  one touch delica lancets: one touch delica lancets, See Instructions, Instructions: change needle daily-testing up to 8 times daily, Supply, # 400 EA, 3 Refill(s), Type: Maintenance, Pharmacy: [facility], keep on hold and pt will notify when needed, baires...  Documented Medications  Documented  ProstaScint 0.5 mg/mL intravenous solution: 0 Refill(s), Type: Maintenance  lisinopril 10 mg oral tablet: = 1 tab(s) ( 10 mg ), Oral, daily, 0 Refill(s), Type: Maintenance,    Medications          *denotes recorded medication          Cris Contour Next test strips: See Instructions, testing 4-8 x/ day, 800 EA, 3 Refill(s).          Auto Titrating CPAP 6-16 for daily use: See Instructions, Heated humidifier x1; Humidifier chamber x1;  Heated tubing x1; Full face mask of choice with headgear  x1; Cushion x 1;  Filters: Disposable x1pk & Reusable x1pk.  Length of Need = 99 Months, 1 EA, 11 Refill(s).          1ST TIER UNIFINE PE 32G X 4MM MISC 32 G X 4MM: See  Instructions, Use to inject as needed., 100 EA, 11 Refill(s).          one touch delica lancets: See Instructions, change needle daily-testing up to 8 times daily, 400 EA, 3 Refill(s).          aspirin 81 mg oral tablet: 81 mg, 1 tab(s), PO, Daily, 90 tab(s).          *ProstaScint 0.5 mg/mL intravenous solution: 0 Refill(s).          NovoLOG 100 units/mL injectable solution: See Instructions, 75 units via pump daily, 90 mL, 1 Refill(s).          *lisinopril 10 mg oral tablet: 10 mg, 1 tab(s), Oral, daily, 0 Refill(s).          Crestor 10 mg oral tablet: 10 mg, 1 tab(s), po, hs, 90 tab(s), 3 Refill(s).       Problem list:    All Problems  Diabetes Type I / ICD-9-.01 / Confirmed  DM neuropathy, type I diabetes mellitus / SNOMED CT 222928926 / Confirmed  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 / Confirmed  Dyslipidemia / ICD-9-.4 / Confirmed  Erectile Dysfunction / ICD-9-.84 / Confirmed  Obstructive sleep apnea syndrome, severe / SNOMED CT 348061486 / Confirmed  Tobacco Abuse-Unspec / ICD-9-.1 / Confirmed  Inactive: GERD (Gastroesophageal Reflux Disease) / ICD-9-.81  Inactive: history of diabeteic ketoacidosis  Resolved: Hospitalized for Diabetes being out of control  Resolved: Retinopathy due to Secondary Diabetes / ICD-9-.50  Canceled: Dyslipidemia / ICD-9-.4  Canceled: Diabetes mellitus type I / SNOMED CT 293834593      Histories   Past Medical History:    Active  Diabetes Type I (250.01): Onset in the month of 9/1985 at 9 years  Tobacco Abuse-Unspec (305.1)  Resolved  Hospitalized for Diabetes being out of control: Onset in 1991 at 15 years.  Resolved.  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  Comments:  1/13/2014 CST 3:55 PM BIBIANA - Arianne HANEY, Nelly  Status post laser therapy (Dr. Sherif Charles)   Family History:    Sleep apnea syndrome  Father  Sister (Tere)  Sister (Melissa)  Psoriasis  Father  Heart attack  Grandfather  (P)  Hypercholesterolemia  Father  Seizure  Sister (Melissa)  Sister (Tere)  Angioplasty  Father  Uncle (P)  MI - Myocardial infarction  Uncle (P)  CAD - Coronary artery disease  Grandfather (P)  Father  Uncle (P)     Procedure history:    Colonoscopy ([id number]) in the month of 1/2015 at 39 Years.  Comments:  7/1/2015 10:07 AM CDT - Tomastutu CMA, Ebonie  Colon, descending, polyp:  1.  Tubular adenoma  2.  No evidence of high grade dsyplasia  3.  Polyp size:5mm(resection/retrievel - complete)  - Repeat in 5yrs   Social History:        Electronic Cigarette/Vaping Assessment            Electronic Cigarette Use: Never.      Alcohol Assessment            Current, Beer (12 oz), 1-2 times per week, 2 drinks/episode average.  3 drinks/episode maximum.      Tobacco Assessment: Current            Current, Cigarettes, 10 per day.            10 or more cigarettes (1/2 pack or more)/day in last 30 days      Employment and Education Assessment            Employed, Work/School description: [employer].        Physical Examination   vital signs stable, as noted above   Vital Signs   6/11/2021 10:40 AM CDT Temperature Tympanic 97.8 DegF  LOW    Oxygen Saturation 100 %    Systolic Blood Pressure Supine 155 mmHg    Diastolic Blood Pressure Supine 85 mmHg    Pulse Supine 83 bpm    Systolic Blood Pressure Sitting 145 mmHg    Diastolic Blood Pressure Sitting 87 mmHg    Pulse Sitting 76 bpm  HI    Systolic Blood Pressure Standing 160 mmHg    Diastolic Blood Pressure Standing 81 mmHg    Pulse Standing 81 bpm      Measurements from flowsheet : Measurements   6/11/2021 10:40 AM CDT Height Measured - Standard 65 in    Weight Measured - Standard 178.4 lb    BSA 1.92 m2    Body Mass Index 29.68 kg/m2  HI      General:  Alert and oriented, No acute distress.    Eye:  Pupils are equal, round and reactive to light, Extraocular movements are intact.    HENT:  Normocephalic, Tympanic membranes are clear, No pharyngeal erythema.    Respiratory:  Lungs  are clear to auscultation, Respirations are non-labored.    Cardiovascular:  Normal rate, Regular rhythm, No murmur, No edema.    Gastrointestinal:  Soft, Non-tender, Normal bowel sounds, wearing insulin pump.    Neurologic:  Alert, Oriented, Normal motor function, No focal deficits.    Cognition and Speech:  Oriented, Speech clear and coherent, Functional cognition intact.    Psychiatric:  Appropriate mood & affect.       Review / Management   Results review      Impression and Plan   Diagnosis     Tobacco Abuse-Unspec (PMQ22-DL Z72.0).     Uncontrolled type I diabetes mellitus with proliferative retinopathy (TBS13-JZ E10.3599).     Erectile dysfunction (LHK50-MU N52.9).     Obstructive sleep apnea (JXU34-LF G47.33).           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  Will be contacting him next week with results and further direction on medication reintroduction    plan as previously outlined and not discussed at today's visit       .) type I Dm, uncontrolled  hypoglycemic medications: none  insulin therapy: Medtronic insulin pump - has CGMS though not using   - ICR 1:6.5   - basal rate: 0.975-1.05   - sensitivity 30mg/dL   - working with Sarah  last HbA1c: 8.6%   microvascular complications: proliferative retinopathy, + DOROTHY, neuropathy   - doesn't think neuropathic pain needing medication at this time  macrovascular complications: none known  ASA/AIDEN/statin:  ASA 81mg daily, lisinopril 10mg daily, rosuvastatin 10mg daily   - smoking cessation counseling    .) erectile  dysfunction - almost certainly related to diabetic neuropathy/vascular disease   - poor response to Viagra and Cialis   - working with Urology; seeing a lot of success with penile injections    .) DAYANA, suspected  - high suspicions  - high [place] sleepiness index (daytime fatigue, snoring, insomnia, etc)    .) health maintenance  - previous colonoscopy in 2015; h/o tubular adenoma; due 2020  - updating adult vaccinations    RTC in 6 months         Professional Services   Counseling Summary:  This was a 25 minute visit with greater than 50% of that time spent counseling the patient, and provision/monitoring of IVFs and clinical status throughout..

## 2022-02-16 NOTE — NURSING NOTE
Quick Intake Entered On:  2/25/2019 7:59 AM CST    Performed On:  2/21/2019 7:59 AM CST by Sindhu Gannon               Summary   Weight Measured :   180 lb(Converted to: 180 lb 0 oz, 81.65 kg)    Height Measured :   65 in(Converted to: 5 ft 5 in, 165.10 cm)    Body Mass Index :   29.95 kg/m2 (HI)    Body Surface Area :   1.93 m2   Sindhu Gannon - 2/25/2019 7:59 AM CST

## 2022-02-16 NOTE — LETTER
(Inserted Image. Unable to display)         November 12, 2019        CHAD LIDDLE  [address] [place], [place] [id number]        Dear QUITA,    Thank you for selecting [facility] for your healthcare needs.    Your Healthcare Provider has recommended that you schedule a diabetes management appointment with our Certified Diabetes Educator, Sindhu Gannon RD, CD, CDE.     Please bring your glucose meter and your blood glucose diary to your appointment.    Available services include:  - Education about diabetes with guidance and support   - Education on the 7 Self Care Behaviors for Diabetes Management:     Healthy Eating   portion control, label readings, carbohydrate recommendations, heart healthy guidelines, meal planning    Being Active - Physical activity guidelines     Monitoring   blood glucose targets, evaluation of blood glucose readings and lab results    Taking Medication   medication management    Problem Solving   discuss any concerns/ questions     Healthy Coping   disease progression and management    Reducing Risks   prevention strategies to help avoid potential complications related to uncontrolled diabetes  - Weight loss strategies    To schedule an appointment or if you have further questions, please contact your primary clinic:   [facility]       [phone]   [facility]       [phone]              [facility]     [phone]      Powered by [employer]    Sincerely,    Sindhu Gannon RD, CD, CDE

## 2022-02-16 NOTE — NURSING NOTE
Urine Dipstick POC Entered On:  10/28/2021 2:32 PM CDT    Performed On:  10/15/2021 2:31 PM CDT by Teresa Ramos               Urine Dipstick POC   Urine Color Urine Dipstick :   Yellow   Urine Appearance Urine Dipstick :   Clear   Glucose Urine Dipstick :   Greater than or equal to 1000   Specific Gravity Urine Dipstick :   1.015   Blood Urine Dipstick :   Negative   pH Urine Dipstick :   5.5   Protein Urine Dipstick :   Negative   Bilirubin Urine Dipstick :   Negative   Urobilinogen Urine Dipstick :   0.2 mg/dl   Nitrite Urine Dipstick :   Negative   Leukocytes Urine Dipstick :   Negative   Ketones Urine Dipstick :   Negative   POC Test Comments :   Performed at [facility]   Teresa Ramos  10/28/2021 2:31 PM CDT

## 2022-02-16 NOTE — PROGRESS NOTES
Patient:   LIDDLE, CHAD A            MRN: [id number]            FIN: [id number]               Age:   41 years     Sex:  Male     :  1975   Associated Diagnoses:   Pneumonitis   Author:   Aubrey Daniels MD      Chief Complaint   2017 8:51 AM CST     Pt c/o cough for 3 days, ear pain, sore throat. Pt cough has been keeping him awake. Asking for cough meds.     Chief complaint and symptoms noted above confirmed with patient.      History of Present Illness             The patient presents with nasal congestion.  The location is both sides.  There were exacerbating factors.  It is described as a sensation of pressure.  The symptom occurs intermittently.  The course is worsening.        Review of Systems   Constitutional:  Fever.    Ear/Nose/Mouth/Throat:  Nasal congestion.    Respiratory:  Cough, No wheezing.       Health Status   Allergies:    Allergic Reactions (Selected)  Severity Not Documented  Ampicillin (Rash)  Simvastatin (Myalgias)   Medications:  (Selected)   Prescriptions  Prescribed  1ST TIER UNIFINE PE 32G X 4MM MISC 32 G X 4MM: 1ST TIER UNIFINE PE 32G X 4MM MISC 32 G X 4MM, See Instructions, Instructions: Use to inject as needed., Supply, # 100 EA, 11 Refill(s), Type: Maintenance, Pharmacy: [facility], keep on file and pt will notify when needed, Use to...  Cialis 20 mg oral tablet: 1 tab(s) ( 20 mg ), po, daily, # 30 tab(s), 1 Refill(s), Pharmacy: [facility], keep on file and pt will notify when needed, 1 tab(s) po daily  Crestor 10 mg oral tablet: 1 tab(s) ( 10 mg ), po, hs, # 90 tab(s), 3 Refill(s), Type: Maintenance, Pharmacy: [facility], 1 tab(s) po hs  Delica One Touch Ultra test Strips: Delica One Touch Ultra test Strips, See Instructions, Instructions: uses 4-5 daily, Supply, # 400 EA, 3 Refill(s), Type: Maintenance, Pharmacy: [facility], keep on file and pt will notify when needed, uses 4-5  daily  Lantus Solostar Pen 100 units/mL subcutaneous solution: ( 28 unit(s) ), subcutaneous, hs, # 30 mL, 1 Refill(s), Pharmacy: [facility], keep on file and pt will notify when needed, 28 unit(s) subcutaneous hs  NovoLOG FlexPen 100 units/mL subcutaneous solution: See Instructions, Instructions: INJECT 8-10 units with meals, # 30 mL, 1 Refill(s), Pharmacy: [facility], keep on file and pt will notify when needed, INJECT 8-10 units with meals  ONE TOUCH ULTRA TEST STRIPS: ONE TOUCH ULTRA TEST STRIPS, See Instructions, Instructions: TESTING 4 TIMES DAILY, Supply, # 400 EA, 3 Refill(s), Type: Maintenance, Pharmacy: [facility], TESTING 4 TIMES DAILY  ONE TOUCH ULTRA TEST STRIPS: ONE TOUCH ULTRA TEST STRIPS, See Instructions, Instructions: TESTS 4 TIMES DAILY, Supply, # 50 EA, 1 Refill(s), Type: Maintenance, Pharmacy: [facility], TESTS 4 TIMES DAILY  aspirin 81 mg oral tablet: 1 tab(s) ( 81 mg ), PO, Daily, # 90 tab(s), 3 Refill(s), Type: Maintenance, Pharmacy: [facility], 1 tab(s) po daily  lisinopril 5 mg oral tablet: 1 tab(s) ( 5 mg ), PO, Daily, # 90 tab(s), 3 Refill(s), Type: Maintenance, Pharmacy: [facility], 1 tab(s) po daily  Documented Medications  Documented  ibuprofen: 0 Refill(s), Type: Maintenance   Problem list:    All Problems  Diabetes Type I / ICD-9-.01 / Confirmed  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 / Confirmed  Dyslipidemia / ICD-9-.4 / Confirmed  Erectile Dysfunction / ICD-9-.84 / Confirmed  Tobacco Abuse-Unspec / ICD-9-.1 / Confirmed  Inactive: GERD (Gastroesophageal Reflux Disease) / ICD-9-.81  Inactive: history of diabeteic ketoacidosis  Resolved: Hospitalized for Diabetes being out of control  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  Resolved.  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  Comments:  1/13/2014 CST 3:55 PM CST - Arianne HANEY, Nelly  Status post laser therapy (Dr. Sherif Charles)   Family History:    Psoriasis  Father  Heart attack  Grandfather (P)  Hypercholesterolemia  Father  Seizure  Sister (Melissa)  Sister (Tere)  Angioplasty  Father  Uncle (P)  MI - Myocardial infarction  Uncle (P)  CAD - Coronary artery disease  Grandfather (P)  Father  Uncle (P)     Procedure history:    Colonoscopy (SNOMED CT 916595607) performed by Dalton Lee in the month of 1/2015 at 39 Years.  Comments:  7/1/2015 10:07 AM - Branstlyle CMA, [place]  Colon, descending, polyp:  1.  Tubular adenoma  2.  No evidence of high grade dsyplasia  3.  Polyp size:5mm(resection/retrievel - complete)  - Repeat in 5yrs   Social History:        Alcohol Assessment            Current, Beer (12 oz), 1-2 times per week, 2 drinks/episode average.  3 drinks/episode maximum.      Tobacco Assessment: Current            Current, Cigarettes, 10 per day.      Employment and Education Assessment            Employed, Work/School description: [employer].        Physical Examination   Vital Signs   2/4/2017 8:51 AM CST Temperature Tympanic 99.4 DegF    Peripheral Pulse Rate 106 bpm  HI    Systolic Blood Pressure 110 mmHg    Diastolic Blood Pressure 70 mmHg    Mean Arterial Pressure 83 mmHg    Oxygen Saturation 98 %      Measurements from flowsheet : Measurements   2/4/2017 8:51 AM CST Height Measured - Standard 65 in    Weight Measured - Standard 173 lb    BSA 1.9 m2    Body Mass Index 28.79 kg/m2      General:  Alert and oriented.    HENT:  Normocephalic.    Neck:  Supple, Non-tender, No lymphadenopathy.    Respiratory:  Lungs are clear  to auscultation.    Cardiovascular:  Normal rate, Regular rhythm.       Review / Management   Results review:  Lab results: 2/4/2017 9:16 AM CST     Influenza Ag              Negative for Influenza A antigen; Negative for Influenza B antigen  .       Impression and Plan   Diagnosis     Pneumonitis (LSE33-LL J18.9).     Course:  Not improving.    Orders     Orders   Requests (Lab):  Influenza A and B Antigen (Request) (Order): Priority: Urgent, Cold virus.     Orders   Pharmacy:  doxycycline monohydrate 100 mg oral capsule (Prescribe): 1 cap(s) ( 100 mg ), PO, bid, x 10 day(s), # 20 cap(s), 0 Refill(s), Type: Maintenance, Pharmacy: [facility], 1 cap(s) po bid,x10 day(s).     Orders     F/U  if not improving, sooner if getting worse.

## 2022-03-02 VITALS
HEART RATE: 76 BPM | SYSTOLIC BLOOD PRESSURE: 122 MMHG | DIASTOLIC BLOOD PRESSURE: 70 MMHG | HEIGHT: 65 IN | BODY MASS INDEX: 31.09 KG/M2

## 2022-03-02 NOTE — NURSING NOTE
Comprehensive Intake Entered On:  1/10/2022 1:17 PM CST    Performed On:  1/10/2022 1:12 PM CST by Katt Melendez               Summary   Chief Complaint :   Verbal consent given for telephone visit. c/o covid sx  that started 1/7/22. Pt was exposed to 3 positive cases through work    Height Measured :   65 in(Converted to: 5 ft 5 in, 165.10 cm)    Katt Melendez - 1/10/2022 1:12 PM CST   Health Status   Allergies Verified? :   Yes   Medication History Verified? :   No   Immunizations Current :   Yes   Medical History Verified? :   Yes   Pre-Visit Planning Status :   N/A   Tobacco Use? :   Current every day smoker   Katt Melendez - 1/10/2022 1:12 PM CST   Consents   Consent for Immunization Exchange :   Consent Granted   Consent for Immunizations to Providers :   Consent Granted   Katt Melendez - 1/10/2022 1:12 PM CST   Meds / Allergies   (As Of: 1/10/2022 1:17:22 PM CST)   Allergies (Active)   ampicillin  Estimated Onset Date:   Unspecified ; Reactions:   Rash ; Created By:   Malika Wilson; Reaction Status:   Active ; Category:   Drug ; Substance:   ampicillin ; Type:   Allergy ; Updated By:   Malika Wilson; Reviewed Date:   1/10/2022 1:15 PM CST      simvastatin  Estimated Onset Date:   Unspecified ; Reactions:   myalgias ; Created By:   Srinivas Brown MD; Reaction Status:   Active ; Category:   Drug ; Substance:   simvastatin ; Type:   Allergy ; Updated By:   Srinivas Brown MD; Reviewed Date:   1/10/2022 1:15 PM CST        Medication List   (As Of: 1/10/2022 1:17:22 PM CST)   Prescription/Discharge Order    rosuvastatin  :   rosuvastatin ; Status:   Prescribed ; Ordered As Mnemonic:   Crestor 10 mg oral tablet ; Simple Display Line:   10 mg, 1 tab(s), po, hs, 90 tab(s), 3 Refill(s) ; Ordering Provider:   Tommy Damon MD; Catalog Code:   rosuvastatin ; Order Dt/Tm:   10/28/2021 6:13:57 PM CDT          nitroglycerin  :   nitroglycerin ; Status:   Prescribed ; Ordered As Mnemonic:    nitroglycerin 0.4 mg sublingual tablet ; Simple Display Line:   0.4 mg, 1 tab(s), SL, q5 min, not to exceed 3 doses/15 min--if pain persists, seek medical attention, PRN: for chest pain, 100 tab(s), 1 Refill(s) ; Ordering Provider:   Tommy Damon MD; Catalog Code:   nitroglycerin ; Order Dt/Tm:   12/28/2021 7:44:32 AM CST          Miscellaneous Rx Supply  :   Miscellaneous Rx Supply ; Status:   Prescribed ; Ordered As Mnemonic:   one touch delica lancets ; Simple Display Line:   See Instructions, change needle daily-testing up to 8 times daily, 400 EA, 3 Refill(s) ; Ordering Provider:   Tommy Damon MD; Catalog Code:   Miscellaneous Rx Supply ; Order Dt/Tm:   8/26/2020 9:21:07 AM CDT          Miscellaneous Rx Supply  :   Miscellaneous Rx Supply ; Status:   Prescribed ; Ordered As Mnemonic:   Auto Titrating CPAP 6-16 for daily use ; Simple Display Line:   See Instructions, Heated humidifier x1; Humidifier chamber x1;  Heated tubing x1; Full face mask of choice with headgear  x1; Cushion x 1;  Filters: Disposable x1pk & Reusable x1pk.  Length of Need = 99 Months, 1 EA, 11 Refill(s) ; Ordering Provider:   Alexander Melgar MD; Catalog Code:   Miscellaneous Rx Supply ; Order Dt/Tm:   11/8/2019 1:34:17 PM CST          Miscellaneous Prescription  :   Miscellaneous Prescription ; Status:   Prescribed ; Ordered As Mnemonic:   Cris Contour Next test strips ; Simple Display Line:   See Instructions, testing 4-8 x/ day, 800 EA, 3 Refill(s) ; Ordering Provider:   Tommy Damon MD; Catalog Code:   Miscellaneous Prescription ; Order Dt/Tm:   12/3/2021 5:39:17 PM CST          lisinopril  :   lisinopril ; Status:   Prescribed ; Ordered As Mnemonic:   lisinopril 20 mg oral tablet ; Simple Display Line:   0.5 tab, Oral, daily, 45 tab(s), 3 Refill(s) ; Ordering Provider:   Tommy Damon MD; Catalog Code:   lisinopril ; Order Dt/Tm:   10/28/2021 6:13:29 PM CDT          insulin lispro  :   insulin lispro ; Status:   Prescribed ; Ordered As  Mnemonic:   HumaLOG 100 units/mL injectable solution ; Simple Display Line:   See Instructions, 75 units via pump, 90 mL, 1 Refill(s) ; Ordering Provider:   Tommy Damon MD; Catalog Code:   insulin lispro ; Order Dt/Tm:   12/10/2021 3:27:42 PM CST          aspirin  :   aspirin ; Status:   Prescribed ; Ordered As Mnemonic:   aspirin 325 mg oral delayed release tablet ; Simple Display Line:   325 mg, 1 tab(s), Oral, daily, 30 tab(s), 0 Refill(s) ; Ordering Provider:   Tommy Damon MD; Catalog Code:   aspirin ; Order Dt/Tm:   12/28/2021 7:44:08 AM CST            Home Meds    capromab pendetide  :   capromab pendetide ; Status:   Documented ; Ordered As Mnemonic:   ProstaScint 0.5 mg/mL intravenous solution ; Simple Display Line:   0 Refill(s) ; Catalog Code:   capromab pendetide ; Order Dt/Tm:   4/28/2020 1:36:07 PM CDT

## 2022-03-02 NOTE — TELEPHONE ENCOUNTER
---------------------  From: Albert Dozier   Sent: 1/11/2022 4:07:44 PM CST  Subject: Negative COVID results.      Called and informed pt of negative results. He had no further questions at this time.

## 2022-03-02 NOTE — NURSING NOTE
Seen for COVID testing at [facility] per  [facility]    O2 Sat = none taken  (Children under 12 do not require O2 sat)    Specimen sent to:   labs for testing    PUI form faxed to [facility] if positive

## 2022-03-02 NOTE — LETTER
(Inserted Image. Unable to display)   [place] [address]  December 30, 2021      CHAD LIDDLE  [address] [place] [address]        Dear QUITA,     Thank you for selecting [facility] for your healthcare needs. Below you will find the results of your recent test(s) done at our clinic.      Labs look okay.  Troponin (marker of heart tissue damage) is normal.  This doesn't eliminate heart issues as cause of symptoms, but more reassuring that there has not been a recent heart attack.  I would very much encourage you to move forward with planned heart evaluation (stress testing and cardiologist assessment).        Result Name Current Result Previous Result Reference Range   Glucose Level (mg/dL) ((H)) 196 12/28/2021  83 6/11/2021 65 - 99   BUN (mg/dL)  24 12/28/2021  14 6/11/2021 7 - 25   Creatinine Level (mg/dL)  1.19 12/28/2021  0.88 6/11/2021 0.60 - 1.35   eGFR (mL/min/1.73m2)  72 12/28/2021  103 6/11/2021 > OR = 60 -    eGFR  (mL/min/1.73m2)  84 12/28/2021  119 6/11/2021 > OR = 60 -    BUN/Creat Ratio  NOT APPLICABLE 12/28/2021  NOT APPLICABLE 6/11/2021 6 - 22   Sodium Level (mmol/L)  135 12/28/2021  139 6/11/2021 135 - 146   Potassium Level (mmol/L)  4.4 12/28/2021  4.0 6/11/2021 3.5 - 5.3   Chloride Level (mmol/L)  99 12/28/2021  107 6/11/2021 98 - 110   CO2 Level (mmol/L)  27 12/28/2021  25 6/11/2021 20 - 32   Calcium Level (mg/dL)  9.6 12/28/2021  9.0 6/11/2021 8.6 - 10.3   WBC  8.7 12/28/2021  8.1 6/11/2021 3.8 - 10.8   RBC ((L)) 4.10 12/28/2021  4.48 6/11/2021 4.20 - 5.80   Hgb (gm/dL) ((L)) 12.5 12/28/2021  13.6 6/11/2021 13.2 - 17.1   Hct (%) ((L)) 37.5 12/28/2021  40.0 6/11/2021 38.5 - 50.0   MCV (fL)  91.5 12/28/2021  89.3 6/11/2021 80.0 - 100.0   MCH (pg)  30.5 12/28/2021  30.4 6/11/2021 27.0 - 33.0   MCHC (gm/dL)  33.3 12/28/2021  34.0 6/11/2021 32.0 - 36.0   RDW (%)  13.2 12/28/2021  13.5  6/11/2021 11.0 - 15.0   Platelet  268 12/28/2021  268 6/11/2021 140 - 400   MPV (fL)  9.5 12/28/2021  9.6 6/11/2021 7.5 - 12.5   Troponin (ng/L)  <3 12/28/2021   - < OR = 47       Please contact me or my assistant at [phone] if you have any questions or concerns.     Sincerely,        Tommy Damon MD    What do your labs mean?  Below is a glossary of commonly ordered labs:  LDL - Bad Cholesterol  HDL - Good Cholesterol  AST/ALT - Liver Function  Cr/Creatinine - Kidney Function  Microalbumin - Kidney Function  BUN - Kidney Function  PSA - Prostate   TSH - Thyroid Hormone  HgbA1c - Diabetes Test  Hgb (Hemoglobin) - Red Blood Cells

## 2022-03-02 NOTE — TELEPHONE ENCOUNTER
---------------------  From: Dimple Hurtado MD   To: Appointment Pool ([id number]);     Sent: 1/10/2022 1:56:35 PM CST !  Subject: COVID TESTING     Please schedule patient for curbside testing today - 2:30pm [facility] [address] [place]  Thank you!  JOO Peterson IS SCHEDULED

## 2022-03-02 NOTE — PROGRESS NOTES
Chief Complaint    Verbal consent given for telephone visit. c/o covid sx  that started 1/7/22. Pt was exposed to 3 positive cases through work  History of Present Illness       Patient is a 46-year-old male on telemedicine visit today       Call time 1:51 PM through 1:57 PM patient at home in [facility]       Physician clinic in [facility]       Patient has had headache, fever, cough, rhinitis and myalgias since last Friday, January 7.       He did receive his second Moderna vaccine on January 6       He was exposed to one coworker this past week who was positive for COVID at work and then over the weekend he did find out 2 more coworkers were positive for COVID       Patient has had COVID twice in the past before getting vaccinated and his symptoms feel similar       He did get his flu shot this fall  Review of Systems       Negative except as listed in HPI  Physical Exam   Vitals & Measurements    HT: 65 in        Telemedicine visit       In general alert, oriented, no acute distress       Breathing is not labored  Assessment/Plan       Cough (R05.9)          We will have him come to [facility] testing today to be tested for COVID         Isolate at home until we get results         Ordered:          SARS-CoV-2 RNA (COVID-19), Qualitative NAAT (Request), Headache  Fever  Cough  Exposure to COVID-19 virus                Exposure to COVID-19 virus (Z20.822)         Ordered:          SARS-CoV-2 RNA (COVID-19), Qualitative NAAT (Request), Headache  Fever  Cough  Exposure to COVID-19 virus                Fever (R50.9)         Ordered:          SARS-CoV-2 RNA (COVID-19), Qualitative NAAT (Request), Headache  Fever  Cough  Exposure to COVID-19 virus                Headache (R51.9)         Ordered:          SARS-CoV-2 RNA (COVID-19), Qualitative NAAT (Request), Headache  Fever  Cough  Exposure to COVID-19 virus           Patient Information     Name:LIDDLE, CHAD TAMIKA      Address:      [address]  [place], [place] [id number]     Sex:Male     YOB: 1975     [phone]     Emergency Contact:LIDDLE, HEATHER M     MRN:[id number]     FIN:[id number]     Location:[facility]     Date of Service:01/10/2022      Primary Care Physician:       Tommy Damon MD, [phone]      Attending Physician:       Dimple Hurtado MD, [phone]  Problem List/Past Medical History    Ongoing     Diabetes Type I     Diabetic Retinopathy       Comments: previous laser treatment     DM neuropathy, type I diabetes mellitus     Dyslipidemia     Erectile Dysfunction     GERD (Gastroesophageal Reflux Disease)     history of diabeteic ketoacidosis     Obstructive sleep apnea syndrome, severe       Comments: On 9/23/19 AFUA 48.7 and oximetry eugenio 69% on HST.     Tobacco Abuse-Unspec    Historical     Hospitalized for Diabetes being out of control     Retinopathy due to Secondary Diabetes       Comments: Status post laser therapy (Dr. Sherif Charles)  Procedure/Surgical History     Colonoscopy (01/2015)      Comments: Colon, descending, polyp:  1.  Tubular adenoma  2.  No evidence of high grade dsyplasia  3.  Polyp size:5mm(resection/retrievel - complete)  - Repeat in 5yrs.  Medications    aspirin 325 mg oral delayed release tablet, 325 mg= 1 tab(s), Oral, daily    Auto Titrating CPAP 6-16 for daily use, See Instructions, 11 refills    Cris Contour Next test strips, See Instructions, 3 refills    Crestor 10 mg oral tablet, 10 mg= 1 tab(s), Oral, hs, 3 refills    HumaLOG 100 units/mL injectable solution, See Instructions, 1 refills    lisinopril 20 mg oral tablet, 0.5 tab, Oral, daily, 3 refills    nitroglycerin 0.4 mg sublingual tablet, 0.4 mg= 1 tab(s), SL, q5 min, PRN, 1 refills    one touch delica lancets, See Instructions, 3 refills    ProstaScint 0.5 mg/mL intravenous solution  Allergies    ampicillin (Rash)    simvastatin (myalgias)  Social History    Smoking Status     Current every day smoker      Alcohol      Current, Beer (12 oz), 1-2 times per week, 2 drinks/episode average. 3 drinks/episode maximum.     Electronic Cigarette/Vaping      Electronic Cigarette Use: Never.     Employment/School      Employed, Work/School description: [employer].     Tobacco - Current      10 or more cigarettes (1/2 pack or more)/day in last 30 days  Family History    Angioplasty: Father and Uncle (P).    CAD - Coronary artery disease: Father, Grandfather (P) and Uncle (P).    Heart attack: Grandfather (P).    Hypercholesterolemia: Father.    MI - Myocardial infarction: Uncle (P).    Psoriasis: Father.    Seizure: Sister and Sister.    Sleep apnea syndrome: Father, Sister and Sister.    Mother: History is negative    Sister: History is negative  Lab Results       Lab Results (Last 4 results within 90 days)        Sodium Level: 135 mmol/L [135 mmol/L - 146 mmol/L] (12/28/21 08:21:00)       Potassium Level: 4.4 mmol/L [3.5 mmol/L - 5.3 mmol/L] (12/28/21 08:21:00)       Chloride Level: 99 mmol/L [98 mmol/L - 110 mmol/L] (12/28/21 08:21:00)       CO2 Level: 27 mmol/L [20 mmol/L - 32 mmol/L] (12/28/21 08:21:00)       Glucose Level: 196 mg/dL High [65 mg/dL - 99 mg/dL] (12/28/21 08:21:00)       BUN: 24 mg/dL [7 mg/dL - 25 mg/dL] (12/28/21 08:21:00)       Creatinine Level: 1.19 mg/dL [0.6 mg/dL - 1.35 mg/dL] (12/28/21 08:21:00)       BUN/Creat Ratio: NOT APPLICABLE [6  - 22] (12/28/21 08:21:00)       eGFR: 72 mL/min/1.73m2 (12/28/21 08:21:00)       eGFR : 84 mL/min/1.73m2 (12/28/21 08:21:00)       Calcium Level: 9.6 mg/dL [8.6 mg/dL - 10.3 mg/dL] (12/28/21 08:21:00)       Hgb A1c: 10.1 High (12/02/21 18:12:00)       Troponin: <3 (12/28/21 08:25:00)       Cholesterol: 157 mg/dL (12/02/21 18:12:00)       Non-HDL Cholesterol: 94 (12/02/21 18:12:00)       HDL: 63 mg/dL (12/02/21 18:12:00)       Cholesterol/HDL Ratio: 2.5 (12/02/21 18:12:00)       LDL: 74 (12/02/21 18:12:00)       Triglyceride: 112 mg/dL (12/02/21  18:12:00)       WBC: 8.7 [3.8  - 10.8] (12/28/21 08:21:00)       RBC: 4.1 Low [4.2  - 5.8] (12/28/21 08:21:00)       Hgb: 12.5 gm/dL Low [13.2 gm/dL - 17.1 gm/dL] (12/28/21 08:21:00)       Hct: 37.5 % Low [38.5 % - 50 %] (12/28/21 08:21:00)       MCV: 91.5 fL [80 fL - 100 fL] (12/28/21 08:21:00)       MCH: 30.5 pg [27 pg - 33 pg] (12/28/21 08:21:00)       MCHC: 33.3 gm/dL [32 gm/dL - 36 gm/dL] (12/28/21 08:21:00)       RDW: 13.2 % [11 % - 15 %] (12/28/21 08:21:00)       Platelet: 268 [140  - 400] (12/28/21 08:21:00)       MPV: 9.5 fL [7.5 fL - 12.5 fL] (12/28/21 08:21:00)       Urine Color Dipstick: Yellow (10/15/21 14:31:00)       Urine Appearance: Clear (10/15/21 14:31:00)       Urine pH Dipstick: 5.5 (10/15/21 14:31:00)       Urine Specific Gravity Dipstick: 1.015 (10/15/21 14:31:00)       Urine Glucose Dipstick: Greater than or equal to 1000 (10/15/21 14:31:00)       Urine Bilirubin Dipstick: Negative (10/15/21 14:31:00)       Urine Ketones Dipstick: Negative (10/15/21 14:31:00)       Urine Blood Dipstick: Negative (10/15/21 14:31:00)       Urine Protein Dipstick: Negative (10/15/21 14:31:00)       Urine Nitrite Dipstick: Negative (10/15/21 14:31:00)       Urine Leukocyte Esterase Dipstick: Negative (10/15/21 14:31:00)       Urine Urobilinogen Dipstick: 0.2 mg/dl (10/15/21 14:31:00)       Chlam/N. gonorrhea Comments: See comment (10/15/21 13:50:00)       Chlamydia RNA: NOT DETECTED (10/15/21 13:50:00)       Neisseria gonorrhoeae RNA: NOT DETECTED (10/15/21 13:50:00)       Urine Culture: See comment (10/15/21 13:50:00)       POC Test Comments: Performed at [facility] (10/15/21 14:31:00)  Immunizations       Scheduled Immunizations       Dose Date(s)       influenza virus vaccine, inactivated       10/08/2020       MMR (measles/mumps/rubella)       04/06/1992, 10/08/1975       tetanus/diphth/pertuss (Tdap) adult/adol       02/25/2008       Other Immunizations               influenza        10/09/2009       pneumococcal       10/09/2009       DTP (obsolete)       1975, 02/21/1975, 10/08/1975, 07/16/1979       OPV       1975, 02/21/1975, 12/17/1975, 07/16/1979       influenza virus vaccine, inactivated       10/19/2010, 10/28/2011, 09/28/2012, 09/27/2013, 11/25/2015, 12/16/2016, 08/24/2017, 10/05/2018, 10/03/2019, 10/28/2021       pneumococcal (PPSV23)       01/10/2014       Td       07/18/1995       tetanus/diphth/pertuss (Tdap) adult/adol       02/16/2018       pneumococcal (PCV13)       08/21/2020       SARS-CoV-2 (COVID-19) Moderna-1273       12/03/2021, 01/06/2022

## 2022-03-02 NOTE — TELEPHONE ENCOUNTER
---------------------  From: Dotty Ritter RN (Phone Messages Pool ([id number]_[facility]))   To: Advanced Practice Provider Pool ([id number]_[facility]);     Sent: 2/14/2022 1:13:40 PM CST  Subject: Lisinopril       PCP:  NAHUN     Time of Call:  12:33pm       Person Calling:  pt  Phone number:  [phone]    Returned call at: 1:10pm    Note:  VM received from pt, requesting BRM to update his prescription for Lisinopril 10mg. Stated [facility] still has the 20mg order.   TC with pt, asking to have a new prescription sent in for Lisinopril 10mg tablets once daily.     Lisinopril 20mg 1/2 tab QD #45 3 refills on 10/28/21    Ok to change to 10mg tablets?    Last office visit and reason:  1/10/22 covid exp [facility] for 10 mg tabs---------------------  From: Wallace AYOUB, Salbador GLORIA (Advanced Practice Provider Pool ([id number]_[facility]))   To: Phone Messages Pool ([id number] [facility]);     Sent: 2/14/2022 2:01:10 PM CST  Subject: RE: LisinoprilTC with pt, informed Lisinopril has been sent to [facility].

## 2022-03-02 NOTE — NURSING NOTE
Vital Signs Entered On:  1/6/2022 3:30 PM CST    Performed On:  1/6/2022 3:30 PM CST by Priya Yang CMA               Vital Signs   Systolic Blood Pressure :   122 mmHg   Diastolic Blood Pressure :   70 mmHg   Mean Arterial Pressure :   87 mmHg   Peripheral Pulse Rate :   76 bpm   Priya Yang CMA - 1/6/2022 3:30 PM CST

## 2022-03-10 ENCOUNTER — TELEPHONE (OUTPATIENT)
Dept: FAMILY MEDICINE | Facility: CLINIC | Age: 47
End: 2022-03-10

## 2022-03-10 DIAGNOSIS — J18.9 PNEUMONIA: Primary | ICD-10-CM

## 2022-03-10 RX ORDER — AZITHROMYCIN 250 MG/1
TABLET, FILM COATED ORAL
Qty: 6 TABLET | Refills: 0 | Status: SHIPPED | OUTPATIENT
Start: 2022-03-10 | End: 2022-06-15

## 2022-03-10 NOTE — TELEPHONE ENCOUNTER
Reason for call:  Patient reporting a symptom    Symptom or request: Pneumonia Symtpoms    Duration (how long have symptoms been present): 24 hrs     Have you been treated for this before? Yes    Additional comments: Patient stating he is having wheezing in lungs, cough, no fever or Headache. Patient states he is leaving on a trip tomorrow wondering if he can get a zpac prescribed. Patient is wondering if he can get a script called in to [facility].     Phone Number patient can be reached at:  Cell number on file:    Telephone Information:   Mobile [phone]       Best Time:  ANytime    Can we leave a detailed message on this number:  YES    Call taken on 3/10/2022 at 4:43 PM by Ledy Ca

## 2022-06-15 ENCOUNTER — OFFICE VISIT (OUTPATIENT)
Dept: FAMILY MEDICINE | Facility: CLINIC | Age: 47
End: 2022-06-15
Payer: COMMERCIAL

## 2022-06-15 VITALS
BODY MASS INDEX: 30.82 KG/M2 | TEMPERATURE: 96.6 F | RESPIRATION RATE: 16 BRPM | HEART RATE: 72 BPM | HEIGHT: 65 IN | SYSTOLIC BLOOD PRESSURE: 118 MMHG | DIASTOLIC BLOOD PRESSURE: 78 MMHG | WEIGHT: 185 LBS
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
## 2022-06-15 DIAGNOSIS — S70.262A TICK BITE OF LEFT HIP, INITIAL ENCOUNTER: Primary | ICD-10-CM

## 2022-06-15 PROBLEM — E11.319 DIABETIC RETINOPATHY (H): Status: ACTIVE | Noted: 2022-06-15

## 2022-06-15 PROBLEM — G47.33 OBSTRUCTIVE SLEEP APNEA SYNDROME: Status: ACTIVE | Noted: 2019-09-23

## 2022-06-15 PROBLEM — E11.40 DIABETIC NEUROPATHY (H): Status: ACTIVE | Noted: 2022-06-15

## 2022-06-15 PROBLEM — E10.9 TYPE 1 DIABETES MELLITUS (H): Status: ACTIVE | Noted: 2022-06-15

## 2022-06-15 PROBLEM — N52.9 ERECTILE DYSFUNCTION: Status: ACTIVE | Noted: 2022-06-15

## 2022-06-15 PROBLEM — E78.5 DYSLIPIDEMIA: Status: ACTIVE | Noted: 2022-06-15

## 2022-06-15 PROCEDURE — 99213 OFFICE O/P EST LOW 20 MIN: CPT | Performed by: PHYSICIAN ASSISTANT

## 2022-06-15 PROCEDURE — 36415 COLL VENOUS BLD VENIPUNCTURE: CPT | Performed by: PHYSICIAN ASSISTANT

## 2022-06-15 PROCEDURE — 86618 LYME DISEASE ANTIBODY: CPT | Performed by: PHYSICIAN ASSISTANT

## 2022-06-15 RX ORDER — INSULIN GLARGINE 100 [IU]/ML
INJECTION, SOLUTION SUBCUTANEOUS
COMMUNITY
End: 2022-06-15

## 2022-06-15 RX ORDER — ASPIRIN 325 MG
325 TABLET, DELAYED RELEASE (ENTERIC COATED) ORAL DAILY
COMMUNITY
Start: 2021-12-28

## 2022-06-15 RX ORDER — ROSUVASTATIN CALCIUM 10 MG/1
10 TABLET, COATED ORAL
COMMUNITY
Start: 2021-10-28 | End: 2022-10-24

## 2022-06-15 RX ORDER — LISINOPRIL 5 MG/1
TABLET ORAL EVERY 24 HOURS
COMMUNITY
End: 2022-06-15

## 2022-06-15 RX ORDER — NITROGLYCERIN 0.4 MG/1
0.4 TABLET SUBLINGUAL
COMMUNITY
Start: 2021-12-28 | End: 2022-11-10

## 2022-06-15 RX ORDER — DOXYCYCLINE 100 MG/1
100 TABLET ORAL 2 TIMES DAILY
Qty: 28 TABLET | Refills: 0 | Status: SHIPPED | OUTPATIENT
Start: 2022-06-15 | End: 2022-06-29

## 2022-06-15 RX ORDER — LISINOPRIL 10 MG/1
10 TABLET ORAL
COMMUNITY
Start: 2022-02-14 | End: 2022-10-24

## 2022-06-15 NOTE — PROGRESS NOTES
"  Assessment & Plan     Tick bite of left hip, initial encounter  This may represent secondary infection vs erythema migrans.  The duration of the tick and type of tick is unclear, pt states it was big.  discussed the lyme serology likely will be negative, though will check and if needed could be repeated at a later date but not particularily helpful this early.    - doxycycline monohydrate (ADOXA) 100 MG tablet  Dispense: 28 tablet; Refill: 0  - Lyme Disease Total Abs Bld with Reflex to Confirm CLIA  - Lyme Disease Total Abs Bld with Reflex to Confirm CLIA               BMI:   Estimated body mass index is 30.79 kg/m  as calculated from the following:    Height as of this encounter: 1.651 m (5' 5\").    Weight as of this encounter: 83.9 kg (185 lb).       {Aletha Carnes PA-C  [facility]    Nicki Kothari is a 46 year oldpresenting for the following health issues:  Tick Bite (Left hip/side that he pulled off last night.  Pt states area is red and painful)    Pt is not certain how long tick was present, but removed an engorged tick last night, has noted increased redness about the area.  No fevers. He generally does not feel 100%, achy but no joint effusions.    He is not clear if the tick was a deer tick or dog tick/wood tick      Review of Systems   Constitutional, HEENT, cardiovascular, pulmonary, gi and gu systems are negative, except as otherwise noted.      Objective    /78 (BP Location: Right arm, Patient Position: Sitting, Cuff Size: Adult Regular)   Pulse 72   Temp (!) 96.6  F (35.9  C) (Tympanic)   Resp 16   Ht 1.651 m (5' 5\")   Wt 83.9 kg (185 lb)   BMI 30.79 kg/m    Body mass index is 30.79 kg/m .  Physical Exam         Exam of the R hip / waistline area reveals a 4 cm x 5 cm erythematous annular rash, with purpuric center, erythema lighter ring externally.    There is a central blood tinged crust which pt reports was due to removing a piece of skin with the " tick. .         .  ..

## 2022-06-16 ENCOUNTER — NURSE TRIAGE (OUTPATIENT)
Dept: FAMILY MEDICINE | Facility: CLINIC | Age: 47
End: 2022-06-16
Payer: COMMERCIAL

## 2022-06-16 LAB — B BURGDOR IGG+IGM SER QL: 0.11

## 2022-06-16 NOTE — TELEPHONE ENCOUNTER
Reason for Call:  Other prescription    Detailed comments: Patient calling stating that merle around tick bite is more red than before. Has taken 2nd dose of Doxycycline and is not feeling good, has chills and fever. Sent to triage    Phone Number Patient can be reached at: Cell number on file:    Telephone Information:   Mobile [phone]       Best Time: anytime    Can we leave a detailed message on this number? YES    Call taken on 6/16/2022 at 12:39 PM by Ledy Ca

## 2022-06-16 NOTE — TELEPHONE ENCOUNTER
TC with pt, given information per BAM. Pt will finish my chart setup and send a picture. Will follow up as advised.

## 2022-06-16 NOTE — TELEPHONE ENCOUNTER
Tc with pt, who stated he was seen for tick bite yesterday. Stated he has taken 2 doses of Doxycycline. Feels nauseated, has chills and stated the bite area is more red and itchy.   Collaboration with BAM who saw pt yesterday, who advised to give another 24- 48 hours for ABX to be effective, take picture of bite site daily, advised to send picture via my chart (assist with finalizing set up), to follow up in clinic tomorrow or Sat.   VM left for pt, requesting return call.

## 2022-07-24 ENCOUNTER — HEALTH MAINTENANCE LETTER (OUTPATIENT)
Age: 47
End: 2022-07-24

## 2022-09-20 ENCOUNTER — MEDICAL CORRESPONDENCE (OUTPATIENT)
Dept: HEALTH INFORMATION MANAGEMENT | Facility: CLINIC | Age: 47
End: 2022-09-20

## 2022-10-03 ENCOUNTER — HEALTH MAINTENANCE LETTER (OUTPATIENT)
Age: 47
End: 2022-10-03

## 2022-10-20 DIAGNOSIS — E10.9 TYPE 1 DIABETES MELLITUS WITHOUT COMPLICATION (H): Primary | ICD-10-CM

## 2022-10-24 ENCOUNTER — MYC REFILL (OUTPATIENT)
Dept: FAMILY MEDICINE | Facility: CLINIC | Age: 47
End: 2022-10-24

## 2022-10-24 DIAGNOSIS — E78.5 DYSLIPIDEMIA: Primary | ICD-10-CM

## 2022-10-24 RX ORDER — INSULIN LISPRO 100 [IU]/ML
INJECTION, SOLUTION INTRAVENOUS; SUBCUTANEOUS
Qty: 90 ML | Refills: 1 | Status: SHIPPED | OUTPATIENT
Start: 2022-10-24 | End: 2023-07-26

## 2022-10-24 NOTE — TELEPHONE ENCOUNTER
Routing refill request to provider for review/approval because:  Failed [facility] Protocol    Last Written Prescription Date:  Patient reported  Last office visit: 6/15/2022 with prescribing provider:  12/28/21 BS not managed.  Future Office Visit:              Hemoglobin A1C   Date Value Ref Range Status   12/02/2021 10.1 (H) <5.7 Final     Comment:     For someone without known diabetes, a hemoglobin A1c  value of 6.5% or greater indicates that they may have   diabetes and this should be confirmed with a follow-up   test.     For someone with known diabetes, a value <7% indicates   that their diabetes is well controlled and a value   greater than or equal to 7% indicates suboptimal   control. A1c targets should be individualized based on   duration of diabetes, age, comorbid conditions, and   other considerations.     Currently, no consensus exists regarding use of  hemoglobin A1c for diagnosis of diabetes for children.      Lab test performed by:  Lab Mnemonic:IBRAHIMA  [facility]  [address] [place] [address]  Artie Burnett M.D.  Unit of Measure:   % of total Hgb  QUEST Specimen received date and time: 04-DEC-2021 01:08:00.00

## 2022-10-26 RX ORDER — LISINOPRIL 10 MG/1
10 TABLET ORAL DAILY
Qty: 90 TABLET | Refills: 0 | Status: SHIPPED | OUTPATIENT
Start: 2022-10-26 | End: 2022-11-10

## 2022-10-26 RX ORDER — ROSUVASTATIN CALCIUM 10 MG/1
10 TABLET, COATED ORAL DAILY
Qty: 90 TABLET | Refills: 0 | Status: SHIPPED | OUTPATIENT
Start: 2022-10-26 | End: 2022-11-10

## 2022-10-26 NOTE — TELEPHONE ENCOUNTER
"    Last Written Prescription Date:  Patient reported  Last office visit provider: 12/28/21    Requested Prescriptions   Pending Prescriptions Disp Refills     rosuvastatin (CRESTOR) 10 MG tablet       Sig: Take 1 tablet (10 mg) by mouth       Statins Protocol Passed - 10/24/2022  2:28 PM        Passed - LDL on file in past 12 months     Recent Labs   Lab Test 12/02/21  1812   LDL 74             Passed - No abnormal creatine kinase in past 12 months     Recent Labs   Lab Test 06/11/21  1323                   Passed - Recent (12 mo) or future (30 days) visit within the authorizing provider's specialty     Patient has had an office visit with the authorizing provider or a provider within the authorizing providers department within the previous 12 mos or has a future within next 30 days. See \"Patient Info\" tab in inbasket, or \"Choose Columns\" in Meds & Orders section of the refill encounter.              Passed - Medication is active on med list        Passed - Patient is age 18 or older           lisinopril (ZESTRIL) 10 MG tablet       Sig: Take 1 tablet (10 mg) by mouth       ACE Inhibitors (Including Combos) Protocol Passed - 10/24/2022  2:28 PM        Passed - Blood pressure under 140/90 in past 12 months     BP Readings from Last 3 Encounters:   06/15/22 118/78   01/06/22 122/70   12/28/21 119/73                 Passed - Recent (12 mo) or future (30 days) visit within the authorizing provider's specialty     Patient has had an office visit with the authorizing provider or a provider within the authorizing providers department within the previous 12 mos or has a future within next 30 days. See \"Patient Info\" tab in inbasket, or \"Choose Columns\" in Meds & Orders section of the refill encounter.              Passed - Medication is active on med list        Passed - Patient is age 18 or older        Passed - Normal serum creatinine on file in past 12 months     Recent Labs   Lab Test 12/28/21  0821   CR 1.19 " "      Ok to refill medication if creatinine is low          Passed - Normal serum potassium on file in past 12 months     Recent Labs   Lab Test 12/28/21  0821   POTASSIUM 4.4                insulin lispro (HUMALOG) 100 UNIT/ML vial         Short Acting Insulin Protocol Failed - 10/24/2022  2:28 PM        Failed - HgbA1C in past 3 or 6 months     If HgbA1C is 8 or greater, it needs to be on file within the past 3 months.  If less than 8, must be on file within the past 6 months.     Recent Labs   Lab Test 12/02/21  1812   A1C 10.1*             Passed - Serum creatinine on file in past 12 months     Recent Labs   Lab Test 12/28/21  0821   CR 1.19       Ok to refill medication if creatinine is low          Passed - Medication is active on med list        Passed - Patient is age 18 or older        Passed - Recent (6 mo) or future (30 days) visit within the authorizing provider's specialty     Patient had office visit in the last 6 months or has a visit in the next 30 days with authorizing provider or within the authorizing provider's specialty.  See \"Patient Info\" tab in inbasket, or \"Choose Columns\" in Meds & Orders section of the refill encounter.                 KIRILL DUBOIS RN 10/26/22 3:15 P  "

## 2022-11-10 ENCOUNTER — OFFICE VISIT (OUTPATIENT)
Dept: FAMILY MEDICINE | Facility: CLINIC | Age: 47
End: 2022-11-10
Payer: COMMERCIAL

## 2022-11-10 VITALS
DIASTOLIC BLOOD PRESSURE: 71 MMHG | SYSTOLIC BLOOD PRESSURE: 107 MMHG | WEIGHT: 182 LBS | HEIGHT: 65 IN | HEART RATE: 54 BPM | BODY MASS INDEX: 30.32 KG/M2

## 2022-11-10 DIAGNOSIS — G47.33 OBSTRUCTIVE SLEEP APNEA SYNDROME: ICD-10-CM

## 2022-11-10 DIAGNOSIS — E10.9 TYPE 1 DIABETES MELLITUS WITHOUT COMPLICATION (H): ICD-10-CM

## 2022-11-10 DIAGNOSIS — E10.42 TYPE 1 DIABETES MELLITUS WITH DIABETIC POLYNEUROPATHY (H): ICD-10-CM

## 2022-11-10 DIAGNOSIS — Z23 NEED FOR VACCINATION: Primary | ICD-10-CM

## 2022-11-10 DIAGNOSIS — I10 ESSENTIAL HYPERTENSION: ICD-10-CM

## 2022-11-10 DIAGNOSIS — G57.12 MERALGIA PARESTHETICA OF LEFT SIDE: ICD-10-CM

## 2022-11-10 DIAGNOSIS — Z00.00 ROUTINE HEALTH MAINTENANCE: ICD-10-CM

## 2022-11-10 DIAGNOSIS — N52.1 ERECTILE DYSFUNCTION DUE TO DISEASES CLASSIFIED ELSEWHERE: ICD-10-CM

## 2022-11-10 DIAGNOSIS — Z00.00 HEALTHCARE MAINTENANCE: ICD-10-CM

## 2022-11-10 DIAGNOSIS — E78.5 DYSLIPIDEMIA: ICD-10-CM

## 2022-11-10 DIAGNOSIS — Z86.0100 H/O COLONOSCOPY WITH POLYPECTOMY: ICD-10-CM

## 2022-11-10 DIAGNOSIS — Z98.890 H/O COLONOSCOPY WITH POLYPECTOMY: ICD-10-CM

## 2022-11-10 PROBLEM — E11.40 DIABETIC NEUROPATHY (H): Status: RESOLVED | Noted: 2022-06-15 | Resolved: 2022-11-10

## 2022-11-10 LAB
ANION GAP SERPL CALCULATED.3IONS-SCNC: 11 MMOL/L (ref 7–15)
BUN SERPL-MCNC: 15.9 MG/DL (ref 6–20)
CALCIUM SERPL-MCNC: 9.7 MG/DL (ref 8.6–10)
CHLORIDE SERPL-SCNC: 102 MMOL/L (ref 98–107)
CHOLEST SERPL-MCNC: 129 MG/DL
CREAT SERPL-MCNC: 1.18 MG/DL (ref 0.67–1.17)
CREAT UR-MCNC: 121 MG/DL
DEPRECATED HCO3 PLAS-SCNC: 24 MMOL/L (ref 22–29)
GFR SERPL CREATININE-BSD FRML MDRD: 76 ML/MIN/1.73M2
GLUCOSE SERPL-MCNC: 248 MG/DL (ref 70–99)
HBA1C MFR BLD: 9.7 % (ref 0–5.6)
HDLC SERPL-MCNC: 46 MG/DL
LDLC SERPL CALC-MCNC: 68 MG/DL
MICROALBUMIN UR-MCNC: <12 MG/L
MICROALBUMIN/CREAT UR: NORMAL MG/G{CREAT}
NONHDLC SERPL-MCNC: 83 MG/DL
POTASSIUM SERPL-SCNC: 4.6 MMOL/L (ref 3.4–5.3)
SODIUM SERPL-SCNC: 137 MMOL/L (ref 136–145)
TRIGL SERPL-MCNC: 75 MG/DL

## 2022-11-10 PROCEDURE — 80048 BASIC METABOLIC PNL TOTAL CA: CPT | Performed by: INTERNAL MEDICINE

## 2022-11-10 PROCEDURE — 83036 HEMOGLOBIN GLYCOSYLATED A1C: CPT | Performed by: INTERNAL MEDICINE

## 2022-11-10 PROCEDURE — 82043 UR ALBUMIN QUANTITATIVE: CPT | Performed by: INTERNAL MEDICINE

## 2022-11-10 PROCEDURE — 80061 LIPID PANEL: CPT | Performed by: INTERNAL MEDICINE

## 2022-11-10 PROCEDURE — 90471 IMMUNIZATION ADMIN: CPT | Performed by: INTERNAL MEDICINE

## 2022-11-10 PROCEDURE — 99214 OFFICE O/P EST MOD 30 MIN: CPT | Mod: 25 | Performed by: INTERNAL MEDICINE

## 2022-11-10 PROCEDURE — 36415 COLL VENOUS BLD VENIPUNCTURE: CPT | Performed by: INTERNAL MEDICINE

## 2022-11-10 PROCEDURE — 90682 RIV4 VACC RECOMBINANT DNA IM: CPT | Performed by: INTERNAL MEDICINE

## 2022-11-10 RX ORDER — INSULIN LISPRO 100 [IU]/ML
INJECTION, SOLUTION INTRAVENOUS; SUBCUTANEOUS
Qty: 90 ML | Refills: 1 | Status: CANCELLED | OUTPATIENT
Start: 2022-11-10

## 2022-11-10 RX ORDER — LISINOPRIL 10 MG/1
10 TABLET ORAL DAILY
Qty: 90 TABLET | Refills: 1 | Status: SHIPPED | OUTPATIENT
Start: 2022-11-10 | End: 2023-07-26 | Stop reason: SINTOL

## 2022-11-10 RX ORDER — ROSUVASTATIN CALCIUM 10 MG/1
10 TABLET, COATED ORAL DAILY
Qty: 90 TABLET | Refills: 1 | Status: SHIPPED | OUTPATIENT
Start: 2022-11-10 | End: 2023-07-26

## 2022-11-10 RX ORDER — LISINOPRIL 10 MG/1
10 TABLET ORAL DAILY
Qty: 90 TABLET | Refills: 0 | Status: SHIPPED | OUTPATIENT
Start: 2022-11-10 | End: 2022-11-10

## 2022-11-10 RX ORDER — ROSUVASTATIN CALCIUM 10 MG/1
10 TABLET, COATED ORAL DAILY
Qty: 90 TABLET | Refills: 0 | Status: SHIPPED | OUTPATIENT
Start: 2022-11-10 | End: 2022-11-10

## 2022-11-10 NOTE — ASSESSMENT & PLAN NOTE
uncontrolled (hyperglycemia)  hypoglycemic medications: none  insulin therapy: Medtronic insulin pump (needing pump set-up/programming with CDE)   - ICR 1:6.5   - basal rate: 1.0   - sensitivity 30mg/dL   - working with Sarah  last HbA1c: 10.1%   microvascular complications: proliferative retinopathy, + DOROTHY, neuropathy   - doesn't think neuropathic pain needing medication at this time  macrovascular complications: none known  ASA/AIDEN/statin:  ASA 81mg daily, lisinopril 20mg daily, rosuvastatin 10mg daily    - smoking cessation counseling

## 2022-11-10 NOTE — ASSESSMENT & PLAN NOTE
- previous colonoscopy in 2015; h/o tubular adenoma; (overdue); arrange for colonoscopy now  - COVID vaccination with original series - declines booster

## 2022-11-10 NOTE — ASSESSMENT & PLAN NOTE
almost certainly related to diabetic neuropathy/vascular disease   - poor response to Viagra and Cialis   - working with Urology; seeing a lot of success with penile injections

## 2022-11-10 NOTE — ASSESSMENT & PLAN NOTE
current antihypertensive regimen: lisinopril 10mg daily  regimen changes: none  intolerance:   future titration/work-up plan:

## 2022-11-10 NOTE — PROGRESS NOTES
Assessment & Plan   Problem List Items Addressed This Visit        Nervous and Auditory    Type 1 diabetes mellitus with diabetic polyneuropathy (H)     uncontrolled (hyperglycemia)  hypoglycemic medications: none  insulin therapy: Medtronic insulin pump (needing pump set-up/programming with CDE)   - ICR 1:6.5   - basal rate: 1.0   - sensitivity 30mg/dL   - working with Sarah  last HbA1c: 10.1%   microvascular complications: proliferative retinopathy, + DOROTHY, neuropathy   - doesn't think neuropathic pain needing medication at this time  macrovascular complications: none known  ASA/AIDEN/statin:  ASA 81mg daily, lisinopril 20mg daily, rosuvastatin 10mg daily    - smoking cessation counseling         Relevant Medications    insulin lispro (HUMALOG) 100 UNIT/ML vial       Respiratory    Obstructive sleep apnea syndrome     on CPAP  - seeing benefit with nightly use            Endocrine    Dyslipidemia    Relevant Medications    lisinopril (ZESTRIL) 10 MG tablet    rosuvastatin (CRESTOR) 10 MG tablet       Circulatory    Essential hypertension     current antihypertensive regimen: lisinopril 10mg daily  regimen changes: none  intolerance:   future titration/work-up plan:           Relevant Medications    lisinopril (ZESTRIL) 10 MG tablet       Other    Erectile dysfunction     almost certainly related to diabetic neuropathy/vascular disease   - poor response to Viagra and Cialis   - working with Urology; seeing a lot of success with penile injections         Healthcare maintenance     - previous colonoscopy in 2015; h/o tubular adenoma; (overdue); arrange for colonoscopy now  - COVID vaccination with original series - declines booster        Other Visit Diagnoses     Need for vaccination    -  Primary    Relevant Orders    INFLUENZA QUAD, PF (RIV4) (FLUBLOK) (Completed)    Type 1 diabetes mellitus without complication (H)        Relevant Medications    insulin lispro (HUMALOG) 100 UNIT/ML vial    Other Relevant Orders     "Albumin Random Urine Quantitative with Creat Ratio    Basic metabolic panel    Hemoglobin A1c    Lipid panel reflex to direct LDL Fasting    Routine health maintenance        Relevant Orders    REVIEW OF HEALTH MAINTENANCE PROTOCOL ORDERS (Completed)    H/O colonoscopy with polypectomy        Relevant Orders    Colonoscopy Screening  Referral    Meralgia paresthetica of left side                  Nicotine/Tobacco Cessation:  He reports that he has been smoking cigarettes. He has never used smokeless tobacco.  Nicotine/Tobacco Cessation Plan:   Information offered: Patient not interested at this time      BMI:   Estimated body mass index is 30.29 kg/m  as calculated from the following:    Height as of this encounter: 1.651 m (5' 5\").    Weight as of this encounter: 82.6 kg (182 lb).   Weight management plan: Discussed healthy diet and exercise guidelines        No follow-ups on file.    Simone Damon MD  [facility]    Nicki Kothari is a 47 year old presenting for the following health issues:  Diabetes      History of Present Illness       Diabetes:   He presents for follow up of diabetes.  He is checking home blood glucose four or more times daily. He checks blood glucose before meals and at bedtime.  Blood glucose is sometimes over 200 and never under 70. He is aware of hypoglycemia symptoms including shakiness, dizziness, lethargy and confusion. He has no concerns regarding his diabetes at this time.  He is not experiencing numbness or burning in feet, excessive thirst, blurry vision, weight changes or redness, sores or blisters on feet. The patient has not had a diabetic eye exam in the last 12 months.         He eats 0-1 servings of fruits and vegetables daily.He consumes 0 sweetened beverage(s) daily.He exercises with enough effort to increase his heart rate 10 to 19 minutes per day.  He exercises with enough effort to increase his heart rate 3 or less days per week. " "  He is taking medications regularly.           Review of Systems   Constitutional, HEENT, cardiovascular, pulmonary, gi and gu systems are negative, except as otherwise noted.      Objective    /71 (BP Location: Right arm, Patient Position: Sitting, Cuff Size: Adult Regular)   Pulse 54   Ht 1.651 m (5' 5\")   Wt 82.6 kg (182 lb)   BMI 30.29 kg/m    Body mass index is 30.29 kg/m .  Physical Exam   GENERAL: healthy, alert and no distress  NECK: no adenopathy, no asymmetry, masses, or scars and thyroid normal to palpation  RESP: lungs clear to auscultation - no rales, rhonchi or wheezes  CV: regular rate and rhythm, normal S1 S2, no S3 or S4, no murmur, click or rub, no peripheral edema and peripheral pulses strong  ABDOMEN: soft, nontender, no hepatosplenomegaly, no masses and bowel sounds normal  MS: no gross musculoskeletal defects noted, no edema    Current Outpatient Medications   Medication     aspirin (ASA) 325 MG EC tablet     capromab pendetide with indium-111 (PROSTASCINT) radioisotope injection     HUMALOG 100 UNIT/ML injection     insulin lispro (HUMALOG) 100 UNIT/ML vial     lisinopril (ZESTRIL) 10 MG tablet     OTHER MEDICAL SUPPLIES     OTHER MEDICAL SUPPLIES     rosuvastatin (CRESTOR) 10 MG tablet     No current facility-administered medications for this visit.                 "

## 2023-01-11 ENCOUNTER — TELEPHONE (OUTPATIENT)
Dept: GASTROENTEROLOGY | Facility: CLINIC | Age: 48
End: 2023-01-11

## 2023-01-11 ENCOUNTER — HOSPITAL ENCOUNTER (OUTPATIENT)
Facility: CLINIC | Age: 48
End: 2023-01-11
Attending: INTERNAL MEDICINE | Admitting: INTERNAL MEDICINE
Payer: COMMERCIAL

## 2023-01-11 NOTE — TELEPHONE ENCOUNTER
Screening Questions  BLUE  KIND OF PREP RED  LOCATION [review exclusion criteria] GREEN  SEDATION TYPE        Y Are you active on mychart?      LILY ADAME  Ordering/Referring Provider?        PREFERREDONE What type of coverage do you have?      N Have you had a positive covid test in the last 14 days?     29 1. BMI  [BMI 40+ - review exclusion criteria]    Y  2. Are you able to give consent for your medical care? [IF NO,RN REVIEW]          N  3. Are you taking any prescription pain medications on a routine schedule   (ex narcotics: tramadol, oxycodone, roxicodone, oxycontin,  and percocet)?          3a. EXTENDED PREP What kind of prescription?     N 4. Do you have any chemical dependencies such as alcohol, street drugs, or methadone?        **If yes 3- 5 , please schedule with MAC sedation.**          IF YES TO ANY 6 - 10 - HOSPITAL SETTING ONLY.     N 6.   Do you need assistance transferring?     N 7.   Have you had a heart or lung transplant?    N 8.   Are you currently on dialysis?   N 9.   Do you use daily home oxygen?   N 10. Do you take nitroglycerin?   10a.  If yes, how often?     11. [FEMALES]   Are you currently pregnant?    11a.  If yes, how many weeks? [ Greater than 12 weeks, OR NEEDED]    N 12. Do you have Pulmonary Hypertension? *NEED PAC APPT AT [facility]*     N 13. [review exclusion criteria]  Do you have any implantable devices in your body (pacemaker, defib, LVAD)?    N 14. In the past 6 months, have you had any heart related issues including cardiomyopathy or heart attack?     14a.  If yes, did it require cardiac stenting if so when?     N 15. Have you had a stroke or Transient ischemic attack (TIA - aka  mini stroke ) within 6 months?      Y- USES CPAP 16. Do you have mod to severe Obstructive Sleep Apnea?  [Hospital only]    N 17. Do you have SEVERE AND UNCONTROLLED asthma? *NEED PAC APPT AT [facility]*     N 18. Are you currently taking any blood thinners?     18a. If yes, inform patient to  "\"follow up w/ ordering provider for bridging instructions.\"    N 19. Do you take the medication Phentermine?    19a. If yes, \"Hold for 7 days before procedure.  Please consult your prescribing provider if you have questions about holding this medication.\"     N  20. Do you have chronic kidney disease?      Y  21. Do you have a diagnosis of diabetes?     N  22. On a regular basis do you go 3-5 days between bowel movements?      23. Preferred LOCAL Pharmacy for Pre Prescription    [ LIST ONLY ONE PHARMACY]      [place] - [address] [place] [address] [facility] -     - CLOSING REMINDERS -    Informed patient they will need an adult    Cannot take any type of public or medical transportation alone    Conscious Sedation- Needs  for 6 hours after the procedure       MAC/General-Needs  for 24 hours after procedure    Pre-Procedure [facility] be [facility]    Confirmed Nurse will call to complete assessment       - SCHEDULING DETAILS -  YES Hospital Setting Required? If yes, what is the exclusion?: DAYANA   LONG  Surgeon    2/10  Date of Procedure  Lower Endoscopy [Colonoscopy]  Type of Procedure Scheduled  [facility] [place] [facility]   STANDARD [facility]-If you answer yes to questions #8, #20, #21Which Colonoscopy Prep was Sent?     CS Sedation Type     N PAC / Pre-op Required                 "

## 2023-01-26 ENCOUNTER — TELEPHONE (OUTPATIENT)
Dept: GASTROENTEROLOGY | Facility: CLINIC | Age: 48
End: 2023-01-26
Payer: COMMERCIAL

## 2023-01-26 DIAGNOSIS — Z12.11 ENCOUNTER FOR SCREENING COLONOSCOPY: ICD-10-CM

## 2023-01-26 DIAGNOSIS — Z86.0100 HISTORY OF COLONIC POLYPS: ICD-10-CM

## 2023-01-26 RX ORDER — BISACODYL 5 MG
TABLET, DELAYED RELEASE (ENTERIC COATED) ORAL
Qty: 4 TABLET | Refills: 0 | Status: ON HOLD | OUTPATIENT
Start: 2023-01-26 | End: 2023-04-20

## 2023-01-26 NOTE — TELEPHONE ENCOUNTER
Pre assessment questions completed for upcoming Colonoscopy  procedure scheduled on 2/10/23    COVID policy reviewed.     Pre-op scheduled  N/A    Reviewed procedural arrival time 0915, procedure time 1000 and facility location [facility]; [address] [place] [address]    Designated  policy reviewed. Instructed to have someone stay 6 hours post procedure.     Anticoagulation/blood thinners? No    Electronic implanted devices? No     Diabetic? Yes - Patient to hold oral diabetic medications day of procedure    Procedure indication: Screening, hx polyps     Bowel prep recommendation: Standard Golytely     Reviewed procedure prep instructions.     Prep instructions sent via [employer].      Bowel prep script sent to    [address]     Patient verbalized understanding and had no questions or concerns at this time.    Malika Beyer RN  Endoscopy Procedure Pre Assessment RN

## 2023-02-08 ENCOUNTER — TELEPHONE (OUTPATIENT)
Dept: GASTROENTEROLOGY | Facility: CLINIC | Age: 48
End: 2023-02-08
Payer: COMMERCIAL

## 2023-02-08 NOTE — TELEPHONE ENCOUNTER
Caller: Chad A Liddle    Reason for Reschedule/Cancellation (please be detailed, any staff messages or encounters to note?): Covid Exposure      Prior to reschedule please review:    Ordering Provider:Simone Damon MD     Sedation per order:Moderate    Does patient have any ASC Exclusions, please identify?: Y, DAYANA      Notes on Cancelled Procedure:    Procedure:Lower Endoscopy [Colonoscopy]     Date: 2/10    Location:[facility]; [address] [place] [address]    Surgeon: Santiago        Rescheduled: Yes    Procedure: Lower Endoscopy [Colonoscopy]     Date: 3/15    Location:[facility]; [address], [place] [address]    Surgeon: Germain    Sedation Level Scheduled  Moderate,  Reason for Sedation Level Per Order    Prep/Instructions updated and sent: Yes

## 2023-02-12 ENCOUNTER — HEALTH MAINTENANCE LETTER (OUTPATIENT)
Age: 48
End: 2023-02-12

## 2023-02-21 ENCOUNTER — TRANSFERRED RECORDS (OUTPATIENT)
Dept: HEALTH INFORMATION MANAGEMENT | Facility: CLINIC | Age: 48
End: 2023-02-21
Payer: COMMERCIAL

## 2023-02-21 LAB — RETINOPATHY: POSITIVE

## 2023-03-07 ENCOUNTER — TELEPHONE (OUTPATIENT)
Dept: GASTROENTEROLOGY | Facility: CLINIC | Age: 48
End: 2023-03-07
Payer: COMMERCIAL

## 2023-03-07 NOTE — TELEPHONE ENCOUNTER
Caller: Chad A Liddle    Reason for Reschedule/Cancellation (please be detailed, any staff messages or encounters to note?): Patient transportation fell through      Prior to reschedule please review:    Ordering Provider:Simone Damon MD    Sedation per order:MODERATE    Does patient have any ASC Exclusions, please identify?: NO      Notes on Cancelled Procedure:    Procedure:Lower Endoscopy [Colonoscopy]     Date: 3/15    Location:[facility]; [address] [place] [address]    Surgeon: TEOFILO        Rescheduled: Yes    Procedure: Lower Endoscopy [Colonoscopy]     Date: 4/20    Location:[facility]; [address] [place] [address]    Surgeon: KAREN    Sedation Level Scheduled  MODERATE,  Reason for Sedation Level PER ORDER    Prep/Instructions updated and sent: RESENT VIA [employer]

## 2023-04-11 RX ORDER — BISACODYL 5 MG/1
TABLET, DELAYED RELEASE ORAL
Qty: 4 TABLET | Refills: 0 | Status: SHIPPED | OUTPATIENT
Start: 2023-04-11 | End: 2023-12-06

## 2023-04-20 ENCOUNTER — HOSPITAL ENCOUNTER (OUTPATIENT)
Facility: CLINIC | Age: 48
Discharge: HOME | End: 2023-04-20
Attending: COLON & RECTAL SURGERY | Admitting: COLON & RECTAL SURGERY
Payer: COMMERCIAL

## 2023-04-20 VITALS
HEART RATE: 78 BPM | DIASTOLIC BLOOD PRESSURE: 79 MMHG | SYSTOLIC BLOOD PRESSURE: 122 MMHG | OXYGEN SATURATION: 98 % | RESPIRATION RATE: 6 BRPM

## 2023-04-20 DIAGNOSIS — Z12.11 ENCOUNTER FOR SCREENING COLONOSCOPY: Primary | ICD-10-CM

## 2023-04-20 LAB
COLONOSCOPY: NORMAL
GLUCOSE BLDC GLUCOMTR-MCNC: 44 MG/DL (ref 70–99)

## 2023-04-20 PROCEDURE — 258N000003 HC RX IP 258 OP 636: Performed by: COLON & RECTAL SURGERY

## 2023-04-20 PROCEDURE — G0105 COLORECTAL SCRN; HI RISK IND: HCPCS | Performed by: COLON & RECTAL SURGERY

## 2023-04-20 PROCEDURE — 45378 DIAGNOSTIC COLONOSCOPY: CPT | Performed by: COLON & RECTAL SURGERY

## 2023-04-20 PROCEDURE — G0500 MOD SEDAT ENDO SERVICE >5YRS: HCPCS | Performed by: COLON & RECTAL SURGERY

## 2023-04-20 PROCEDURE — 82962 GLUCOSE BLOOD TEST: CPT

## 2023-04-20 PROCEDURE — 250N000011 HC RX IP 250 OP 636: Performed by: COLON & RECTAL SURGERY

## 2023-04-20 RX ORDER — ONDANSETRON 2 MG/ML
4 INJECTION INTRAMUSCULAR; INTRAVENOUS
Status: DISCONTINUED | OUTPATIENT
Start: 2023-04-20 | End: 2023-04-20 | Stop reason: HOSPADM

## 2023-04-20 RX ORDER — FENTANYL CITRATE 50 UG/ML
INJECTION, SOLUTION INTRAMUSCULAR; INTRAVENOUS PRN
Status: DISCONTINUED | OUTPATIENT
Start: 2023-04-20 | End: 2023-04-20 | Stop reason: HOSPADM

## 2023-04-20 RX ORDER — LIDOCAINE 40 MG/G
CREAM TOPICAL
Status: DISCONTINUED | OUTPATIENT
Start: 2023-04-20 | End: 2023-04-20 | Stop reason: HOSPADM

## 2023-04-20 ASSESSMENT — ACTIVITIES OF DAILY LIVING (ADL): ADLS_ACUITY_SCORE: 35

## 2023-04-20 NOTE — H&P
Colon & Rectal Surgery History and Physical  Pre-Endoscopy Procedure Note    History of Present Illness   I have been asked by Dr. Damon to evaluate this 47 year old male for colorectal polyp surveillance. He currently denies any abdominal pain, weight loss, bleeding per rectum, or recent change in bowel habits.    Past Medical History  Diagnosis Date     Diabetes       Hyperlipidemia LDL      Hypertension      Sleep apnea        Past Surgical History  Procedure Laterality Date     ORTHOPEDIC SURGERY      L hand ring finger        Medications  Medication Sig     aspirin (ASA) 325 MG EC tablet Take 325 mg by mouth     HUMALOG 100 UNIT/ML injection USE UP TO 75 UNITS VIA PUMP ONCE DAILY     lisinopril (ZESTRIL) 10 MG tablet Take 1 tablet (10 mg) by mouth daily     rosuvastatin (CRESTOR) 10 MG tablet Take 1 tablet (10 mg) by mouth daily     insulin lispro (HUMALOG) 100 UNIT/ML vial        Allergies  Allergen Reactions     Simvastatin Muscle Pain (Myalgia)     Other reaction(s): myalgias     Gluten Meal      Ampicillin Rash        Family History   Family history is not contributory    Social History    He reports that he has been smoking cigarettes. He has never used smokeless tobacco. He reports current alcohol use. He reports current drug use. Drug: Marijuana.    Review of Systems   Constitutional:  No fever, weight change or fatigue.    Eyes:     No dry eyes or vision changes.   Ears/Nose/Throat/Neck:  No oral ulcers, sore throat or voice change.    Cardiovascular:   No palpitations, syncope, angina or edema.   Respiratory:    No chest pain, excessive sleepiness, shortness of breath or hemoptysis.    Gastrointestinal:   No abdominal pain, nausea, vomiting, diarrhea or heartburn.    Genitourinary:   No dysuria, hematuria, urinary retention or urinary frequency.   Musculoskeletal:  No joint swelling or arthralgias.    Dermatologic:  No skin rash or other skin changes.   Neurologic:    No focal weakness or numbness. No  neuropathy.   Psychiatric:    No depression, anxiety, suicidal ideation, or paranoid ideation.   Endocrine:   No cold or heat intolerance, polydipsia, hirsutism, change in libido, or flushing.   Hematology/Lymphatic:  No bleeding or lymphadenopathy.    Allergy/Immunology:  No rhinitis or hives.     Physical Exam   Vitals:  /79, HR 79, resp. rate 16, SpO2 99 %.    General:  Alert and oriented to person, place and time   Airway: Normal oropharyngeal airway and neck mobility   Lungs:  Clear bilaterally   Heart:  Regular rate and rhythm   Abdomen: Soft, NT, ND, no masses   Extremities: Warm, good capillary refill    ASA Grade: II (mild systemic disease)    Impression: Cleared for use of conscious sedation for colorectal polyp surveillance    Plan: Proceed with colonoscopy     Jackie Quintero MD  [facility]  [phone]

## 2023-04-20 NOTE — PROVIDER NOTIFICATION
Patient checked blood sugar in recovery on own device after receiving D5 through the IV as well as drinking cups of juice- Blood sugar @ 0902: 228. Pt states he is feeling fine.

## 2023-05-20 ENCOUNTER — HEALTH MAINTENANCE LETTER (OUTPATIENT)
Age: 48
End: 2023-05-20

## 2023-07-14 DIAGNOSIS — E10.42 TYPE 1 DIABETES MELLITUS WITH DIABETIC POLYNEUROPATHY (H): Primary | ICD-10-CM

## 2023-07-14 NOTE — TELEPHONE ENCOUNTER
"Routing refill request to provider for review/approval because:  Labs not current:  creatinine    Last Written Prescription Date:  11/10/22  Last Fill Quantity: 90,  # refills: 1   Last office visit provider:   11/10/22 with DASHA Damon    Requested Prescriptions   Pending Prescriptions Disp Refills     lisinopril (ZESTRIL) 10 MG tablet 90 tablet 0     Sig: Take 1 tablet (10 mg) by mouth daily       ACE Inhibitors (Including Combos) Protocol Failed - 7/14/2023  1:18 PM        Failed - Normal serum creatinine on file in past 12 months     Recent Labs   Lab Test 11/10/22  1509   CR 1.18*       Ok to refill medication if creatinine is low          Passed - Blood pressure under 140/90 in past 12 months     BP Readings from Last 3 Encounters:   04/20/23 122/79   11/10/22 107/71   06/15/22 118/78                 Passed - Recent (12 mo) or future (30 days) visit within the authorizing provider's specialty     Patient has had an office visit with the authorizing provider or a provider within the authorizing providers department within the previous 12 mos or has a future within next 30 days. See \"Patient Info\" tab in inbasket, or \"Choose Columns\" in Meds & Orders section of the refill encounter.              Passed - Medication is active on med list        Passed - Patient is age 18 or older        Passed - Normal serum potassium on file in past 12 months     Recent Labs   Lab Test 11/10/22  1509   POTASSIUM 4.6                  CARL FERNANDEZ RN 07/14/23 1:18 PM  "

## 2023-07-15 RX ORDER — LISINOPRIL 10 MG/1
10 TABLET ORAL DAILY
Qty: 90 TABLET | Refills: 3 | Status: SHIPPED | OUTPATIENT
Start: 2023-07-15 | End: 2023-07-26

## 2023-07-26 ENCOUNTER — OFFICE VISIT (OUTPATIENT)
Dept: FAMILY MEDICINE | Facility: CLINIC | Age: 48
End: 2023-07-26
Payer: COMMERCIAL

## 2023-07-26 VITALS
WEIGHT: 175 LBS | DIASTOLIC BLOOD PRESSURE: 67 MMHG | HEIGHT: 65 IN | OXYGEN SATURATION: 98 % | TEMPERATURE: 98.2 F | HEART RATE: 69 BPM | SYSTOLIC BLOOD PRESSURE: 105 MMHG | BODY MASS INDEX: 29.16 KG/M2 | RESPIRATION RATE: 17 BRPM

## 2023-07-26 DIAGNOSIS — G47.33 OBSTRUCTIVE SLEEP APNEA SYNDROME: ICD-10-CM

## 2023-07-26 DIAGNOSIS — E10.9 TYPE 1 DIABETES MELLITUS WITHOUT COMPLICATION (H): ICD-10-CM

## 2023-07-26 DIAGNOSIS — E10.42 TYPE 1 DIABETES MELLITUS WITH DIABETIC POLYNEUROPATHY (H): Primary | ICD-10-CM

## 2023-07-26 DIAGNOSIS — Z00.00 HEALTHCARE MAINTENANCE: ICD-10-CM

## 2023-07-26 DIAGNOSIS — E78.5 DYSLIPIDEMIA: ICD-10-CM

## 2023-07-26 DIAGNOSIS — N52.1 ERECTILE DYSFUNCTION DUE TO DISEASES CLASSIFIED ELSEWHERE: ICD-10-CM

## 2023-07-26 PROBLEM — I10 ESSENTIAL HYPERTENSION: Status: RESOLVED | Noted: 2022-11-10 | Resolved: 2023-07-26

## 2023-07-26 LAB — HBA1C MFR BLD: 9.4 % (ref 0–5.6)

## 2023-07-26 PROCEDURE — 99214 OFFICE O/P EST MOD 30 MIN: CPT | Performed by: INTERNAL MEDICINE

## 2023-07-26 PROCEDURE — 36415 COLL VENOUS BLD VENIPUNCTURE: CPT | Performed by: INTERNAL MEDICINE

## 2023-07-26 PROCEDURE — 83036 HEMOGLOBIN GLYCOSYLATED A1C: CPT | Performed by: INTERNAL MEDICINE

## 2023-07-26 RX ORDER — ROSUVASTATIN CALCIUM 10 MG/1
10 TABLET, COATED ORAL DAILY
Qty: 90 TABLET | Refills: 3 | Status: SHIPPED | OUTPATIENT
Start: 2023-07-26 | End: 2024-08-26

## 2023-07-26 RX ORDER — INSULIN LISPRO 100 [IU]/ML
INJECTION, SOLUTION INTRAVENOUS; SUBCUTANEOUS
Qty: 90 ML | Refills: 3 | Status: SHIPPED | OUTPATIENT
Start: 2023-07-26 | End: 2023-09-01

## 2023-07-26 RX ORDER — LISINOPRIL 10 MG/1
10 TABLET ORAL DAILY
Qty: 90 TABLET | Refills: 1 | Status: CANCELLED | OUTPATIENT
Start: 2023-07-26

## 2023-07-26 NOTE — ASSESSMENT & PLAN NOTE
- colonoscopy (4/2023): no polyps - recs for 10 yr follow-up  - COVID vaccination with original series - declines booster

## 2023-07-26 NOTE — ASSESSMENT & PLAN NOTE
uncontrolled (hyperglycemia)  hypoglycemic medications: none  insulin therapy: Medtronic insulin pump (needing pump set-up/programming with CDE)   - ICR 1:6.5   - basal rate: 1.0   - sensitivity 30mg/dL   - working with Sarah  last HbA1c: 9.4%   microvascular complications: proliferative retinopathy, + DOROTHY, neuropathy   - doesn't think neuropathic pain needing medication at this time  macrovascular complications: none known  ASA/AIDEN/statin:  ASA 81mg daily, no ACEi/ARB due to hypotension, rosuvastatin 10mg daily    - smoking cessation counseling   RODRIGUE BABCOCK      70y Female without c/o pain                                                                                                                              POD # 3       STATUS POST:               Pre-Op Dx: Subtrochanteric fracture of left femur    Post-Op Dx:  Fracture, subtrochanteric, left femur, closed    Procedure: Open reduction and internal fixation (ORIF) of fracture of left proximal femur using intertrochanteric-subtrochanteric nail                                                  T(F): 98.7  HR: 80  BP: 116/67  RR: 18  SpO2: 95%                        9.8    10.12 )-----------( 291      ( 15 Ankit 2020 05:20 )             31.1                     06-15    140  |  105  |  7   ----------------------------<  97  3.7   |  26  |  0.63    Ca    8.5      15 Ankit 2020 05:20        Physical Exam :    -   Dressing  with mild blood staining/D/I.   -   No motor function and insensate b/l LE's  -   Warm well perfused; capillary refill <3 seconds   -   (+) Sensation to light touch    A/P: 70y Female s/p ORIF Fracture, subtrochanteric, left femur, closed    -   Ortho Stable  -   Medicine to follow  -   DVT ppx:     [x ]SCDs     [ ] ASA     [ ] Eliquis     [x ] Lovenox  -   Weight bearing status:  WBAT [ ]        PWB    [ ]     TTWB  [ ]      NWB  [x ]   -  Dispo:     Home [ x]     Acute Rehab [ ]     [facility] [ ]     TBD [ ]

## 2023-07-26 NOTE — PROGRESS NOTES
"  Assessment & Plan   Problem List Items Addressed This Visit          Nervous and Auditory    Type 1 diabetes mellitus with diabetic polyneuropathy (H) - Primary     uncontrolled (hyperglycemia)  hypoglycemic medications: none  insulin therapy: Medtronic insulin pump (needing pump set-up/programming with CDE)   - ICR 1:6.5   - basal rate: 1.0   - sensitivity 30mg/dL   - working with Saarh  last HbA1c: 9.4%   microvascular complications: proliferative retinopathy, + DOROTHY, neuropathy   - doesn't think neuropathic pain needing medication at this time  macrovascular complications: none known  ASA/AIDEN/statin:  ASA 81mg daily, no ACEi/ARB due to hypotension, rosuvastatin 10mg daily    - smoking cessation counseling         Relevant Medications    insulin lispro (HUMALOG) 100 UNIT/ML vial       Respiratory    Obstructive sleep apnea syndrome     on CPAP (though current home unit not functioning properly and needing reassessment)  - working on getting reevaluated by sleep technician  - seeing benefit with nightly use            Endocrine    Dyslipidemia    Relevant Medications    rosuvastatin (CRESTOR) 10 MG tablet       Other    Erectile dysfunction     almost certainly related to diabetic neuropathy/vascular disease   - poor response to Viagra and Cialis   - working with Urology; seeing a lot of success with penile injections         Healthcare maintenance     - colonoscopy (4/2023): no polyps - recs for 10 yr follow-up  - COVID vaccination with original series - declines booster          Other Visit Diagnoses       Type 1 diabetes mellitus without complication (H)        Relevant Medications    insulin lispro (HUMALOG) 100 UNIT/ML vial    Other Relevant Orders    Hemoglobin A1c (Completed)                  BMI:   Estimated body mass index is 29.12 kg/m  as calculated from the following:    Height as of this encounter: 1.651 m (5' 5\").    Weight as of this encounter: 79.4 kg (175 lb).   Weight management plan: Discussed " healthy diet and exercise guidelines        MD MICHELLE [facility]    Nicki Kothari is a 47 year old, presenting for the following health issues: Presents for general follow-up.  Needing evaluation and likely replacement of his home CPAP machine.  Has not been able to use now for over a month due to significant noise turbulence.  Has noticed significant effects on his overnight snoring and daytime somnolence.  Does bring in his CPAP device today in the clinic.  Stable with Medtronic insulin pump.  Still seeing quite a bit of lability with his blood sugar control.  Had been laid off now currently working in [employer]; ideally would like to have shorter commute and working closer to home.  States otherwise things are going well.  Cannot tolerate taking lisinopril due to symptomatic hypotension.  Diabetes, Sleep Problem (Needs sleep appt with ), and Hypertension (Has been feeling dizzy. Is asking if his bp is getting low.)        7/26/2023     6:57 AM   Additional Questions   Roomed by Sana DONOVAN     History of Present Illness       Diabetes:   He presents for follow up of diabetes.  He is checking home blood glucose four or more times daily.   He checks blood glucose before meals and at bedtime.  Blood glucose is sometimes over 200 and sometimes under 70. He is aware of hypoglycemia symptoms including lethargy.    He has no concerns regarding his diabetes at this time.   He is not experiencing numbness or burning in feet, excessive thirst, blurry vision, weight changes or redness, sores or blisters on feet.           He eats 2-3 servings of fruits and vegetables daily.He consumes 0 sweetened beverage(s) daily.He exercises with enough effort to increase his heart rate 20 to 29 minutes per day.  He exercises with enough effort to increase his heart rate 4 days per week.   He is taking medications regularly.       Diabetes Follow-up    How often are you checking  "your blood sugar? A few times a week  What time of day are you checking your blood sugars (select all that apply)?  Before meals  Have you had any blood sugars above 200?  No  Have you had any blood sugars below 70?  No  What symptoms do you notice when your blood sugar is low?  Dizzy  What concerns do you have today about your diabetes? None   Do you have any of these symptoms? (Select all that apply)  No numbness or tingling in feet.  No redness, sores or blisters on feet.  No complaints of excessive thirst.  No reports of blurry vision.  No significant changes to weight.          Hyperlipidemia Follow-Up    Are you regularly taking any medication or supplement to lower your cholesterol?   Yes-    Are you having muscle aches or other side effects that you think could be caused by your cholesterol lowering medication?  No    Hypertension Follow-up    Do you check your blood pressure regularly outside of the clinic? No   Are you following a low salt diet? No  Are your blood pressures ever more than 140 on the top number (systolic) OR more   than 90 on the bottom number (diastolic), for example 140/90? No    BP Readings from Last 2 Encounters:   07/26/23 105/67   04/20/23 122/79     Hemoglobin A1C   Date Value   11/10/2022 9.7 % (H)   12/02/2021 10.1 (H)     LDL Cholesterol Calculated   Date Value   11/10/2022 68 mg/dL   12/02/2021 74     LDL Cholesterol Direct (mg/dL)   Date Value   03/14/2019 137 (H)   06/20/2011 112           Review of Systems   Constitutional, HEENT, cardiovascular, pulmonary, gi and gu systems are negative, except as otherwise noted.      Objective    /67   Pulse 69   Temp 98.2  F (36.8  C)   Ht 1.651 m (5' 5\")   Wt 79.4 kg (175 lb)   SpO2 98%   BMI 29.12 kg/m    Body mass index is 29.12 kg/m .  Physical Exam   GENERAL: healthy, alert and no distress  NECK: no adenopathy, no asymmetry, masses, or scars and thyroid normal to palpation  RESP: lungs clear to auscultation - no rales, " rhonchi or wheezes  CV: regular rate and rhythm, normal S1 S2, no S3 or S4, no murmur, click or rub, no peripheral edema and peripheral pulses strong  ABDOMEN: soft, nontender, no hepatosplenomegaly, no masses and bowel sounds normal  MS: no gross musculoskeletal defects noted, no edema    Admission on 04/20/2023, Discharged on 04/20/2023   Component Date Value Ref Range Status    GLUCOSE BY METER POCT 04/20/2023 44 (LL)  70 - 99 mg/dL Final    Dr/RN Notified    COLONOSCOPY 04/20/2023    Final                    Value:[facility]  [address] [place]  [address]  _______________________________________________________________________________  Patient Name: Taye Liddle             Procedure Date: 4/20/2023 8:33 AM  MRN: [id number]                       Account Number: [id number]  YOB: 1975              Admit Type: Outpatient  Age: 47                               Room: 2  Note Status: Finalized                Attending MD: AMARI JONES MD,   Instrument Name: 512 [id number]-CM513O Peds Colon   _______________________________________________________________________________     Procedure:                Colonoscopy  Indications:              High risk colon cancer surveillance: Personal                             history of colonic polyps  Providers:                AMARI JONES MD, Jessica Cui RN  Referring MD:             LILY ADAME MD  Medicines:                Midazolam 3 mg IV, Fentanyl 100 micrograms IV  Complications:            No immediate compli                          cations. Estimated blood loss:                             None.  _______________________________________________________________________________  Procedure:                Pre-Anesthesia Assessment:                            - Prior to the procedure, a History and Physical                             was performed, and patient medications and                             allergies were reviewed.  The patient is competent.                             The risks and benefits of the procedure and the                             sedation options and risks were discussed with the                             patient. All questions were answered and informed                             consent was obtained. Patient identification and                             proposed procedure were verified by the physician                             in the procedure room. Mental Status Examination:                             alert and oriented. Airway Examination: normal                             oroph                          aryngeal airway and neck mobility. Respiratory                             Examination: clear to auscultation. CV Examination:                             normal. ASA Grade Assessment: II - A patient with                             mild systemic disease. After reviewing the risks                             and benefits, the patient was deemed in                             satisfactory condition to undergo the procedure.                             The anesthesia plan was to use moderate sedation /                             analgesia (conscious sedation). Immediately prior                             to administration of medications, the patient was                             re-assessed for adequacy to receive sedatives. The                             heart rate, respiratory rate, oxygen saturations,                             blood pressure, adequacy of pulmonary ventilation,                             and response to care were monitored throughout the                                                       procedure. The physical status of the patient was                             re-assessed after the procedure.                            After obtaining informed consent, the colonoscope                             was passed under direct vision. Throughout the                              procedure, the patient's blood pressure, pulse, and                             oxygen saturations were monitored continuously. The                             peds colonoscope 512 was introduced through the                             anus and advanced to the cecum, identified by                             appendiceal orifice and ileocecal valve. The                             ileocecal valve and the appendiceal orifice were                             photographed. The entire colon was well visualized.                             The colonoscopy was performed with ease. The                             patient tolerated the procedure well. The quality                             of the bowel                           preparation was excellent.                                                                                   Findings:       The perianal and digital rectal examinations were normal.       A few small-mouthed diverticula were found in the sigmoid colon.                                                                                   Impression:               - Diverticulosis in the sigmoid colon.                            - No specimens collected.  Recommendation:           - Discharge patient to home (ambulatory).                            - Repeat colonoscopy in 10 years for surveillance.                                                                                   Procedure Code(s):       --- Professional ---       41863, Colonoscopy, flexible; diagnostic, including collection of        specimen(s) by brushing or washing, when performed (separate procedure)  Diagnosis Code(s):       --- Professional ---       Z86.010, Personal history of colonic polyps       K57.30, Diverticu                          losis of large intestine without perforation or abscess        without bleeding    CPT copyright 2021 [employer]. All rights reserved.    The codes documented in this report  are preliminary and upon  review may   be revised to meet current compliance requirements.    _______________________  AMARI JONES MD  4/20/2023 8:50:51 AM  I was physically present for the entire viewing portion of the exam.  AMARI JONES MD  Number of Addenda: 0    Note Initiated On: 4/20/2023 8:33 AM  Scope Withdrawal Time: 0 hours 6 minutes 10 seconds   Total Procedure Duration: 0 hours 9 minutes 38 seconds   Scope In: 8:37:38 AM  Scope Out: 8:47:16 AM

## 2023-07-26 NOTE — ASSESSMENT & PLAN NOTE
on CPAP (though current home unit not functioning properly and needing reassessment)  - working on getting reevaluated by sleep technician  - seeing benefit with nightly use

## 2023-08-01 ENCOUNTER — OFFICE VISIT (OUTPATIENT)
Dept: FAMILY MEDICINE | Facility: CLINIC | Age: 48
End: 2023-08-01
Payer: COMMERCIAL

## 2023-08-01 VITALS
BODY MASS INDEX: 28.99 KG/M2 | WEIGHT: 174 LBS | HEIGHT: 65 IN | SYSTOLIC BLOOD PRESSURE: 154 MMHG | OXYGEN SATURATION: 97 % | RESPIRATION RATE: 20 BRPM | HEART RATE: 72 BPM | TEMPERATURE: 97 F | DIASTOLIC BLOOD PRESSURE: 70 MMHG

## 2023-08-01 DIAGNOSIS — G47.33 OBSTRUCTIVE SLEEP APNEA SYNDROME: Primary | ICD-10-CM

## 2023-08-01 DIAGNOSIS — I10 PRIMARY HYPERTENSION: ICD-10-CM

## 2023-08-01 PROCEDURE — 99213 OFFICE O/P EST LOW 20 MIN: CPT | Performed by: INTERNAL MEDICINE

## 2023-08-01 RX ORDER — INSULIN ASPART 100 [IU]/ML
INJECTION, SOLUTION INTRAVENOUS; SUBCUTANEOUS
COMMUNITY
End: 2023-12-06

## 2023-08-01 NOTE — ASSESSMENT & PLAN NOTE
10/30/2019 HST revealing severe sleep apnea with an AHI of 48.7 and oximetry eugenio 69%  APAP 6-16    Compliance report reveals excellent compliance and results.  Using his CPAP device approximately 7 hours per night and over 4 hours per night 89% of the time with an AHI of 1.7.    We will set up a tech appointment due to the device noise.

## 2023-08-01 NOTE — ASSESSMENT & PLAN NOTE
Medication stopped due to hypotension  Blood pressure high today  Patient will get a home monitor get some readings and message Dr. Damon.

## 2023-08-01 NOTE — PROGRESS NOTES
"  Assessment & Plan   Problem List Items Addressed This Visit          Respiratory    Obstructive sleep apnea syndrome - Primary     10/30/2019 HST revealing severe sleep apnea with an AHI of 48.7 and oximetry eugenio 69%  APAP 6-16    Compliance report reveals excellent compliance and results.  Using his CPAP device approximately 7 hours per night and over 4 hours per night 89% of the time with an AHI of 1.7.    We will set up a tech appointment due to the device noise.         Relevant Orders    CPAP Order for DME - ONLY FOR DME (Completed)       Circulatory    Primary hypertension     Medication stopped due to hypotension  Blood pressure high today  Patient will get a home monitor get some readings and message Dr. Damon.                          Alexander Melgar MD  [facility]    Nicki Kothari is a 47 year old, presenting for the following health issues:  Sleep Apnea (Pt here for Follow-up on his DAYANA.  Pt states his CPAP is \"rattling\" and the noise keeps him up at night.)        7/26/2023     6:57 AM   Additional Questions   Roomed by Sana DONOVAN       History of Present Illness       Diabetes:   He presents for follow up of diabetes.  He is checking home blood glucose four or more times daily.   He checks blood glucose before meals and at bedtime.  Blood glucose is sometimes over 200 and sometimes under 70. He is aware of hypoglycemia symptoms including lethargy.    He has no concerns regarding his diabetes at this time.   He is not experiencing numbness or burning in feet, excessive thirst, blurry vision, weight changes or redness, sores or blisters on feet.           He eats 2-3 servings of fruits and vegetables daily.He consumes 0 sweetened beverage(s) daily.He exercises with enough effort to increase his heart rate 20 to 29 minutes per day.  He exercises with enough effort to increase his heart rate 4 days per week.   He is taking medications regularly.     Patient with history " "of type 1 diabetes on insulin pump and severe obstructive sleep apnea presents because his device suddenly began making excessive noise during exhalation like \"something is loose.\"  The device has been very beneficial in terms of reduce daytime sleepiness.  He has been very compliant.  Multiple family members on CPAP.  Lisinopril recently stopped due to hypotension.            Review of Systems   No recent change in weight, heartburn, headache, myalgia, chest pain, dyspnea.      Objective    BP (!) 155/73 (BP Location: Right arm, Patient Position: Sitting, Cuff Size: Adult Regular)   Pulse 72   Temp 97  F (36.1  C) (Tympanic)   Resp 20   Ht 1.651 m (5' 5\")   Wt 78.9 kg (174 lb)   SpO2 97%   BMI 28.96 kg/m    Body mass index is 28.96 kg/m .  Physical Exam   Healthy-appearing man in no distress.  Alert and oriented.  In good spirits                      "

## 2023-08-10 ENCOUNTER — TELEPHONE (OUTPATIENT)
Dept: FAMILY MEDICINE | Facility: CLINIC | Age: 48
End: 2023-08-10
Payer: COMMERCIAL

## 2023-08-10 DIAGNOSIS — I10 PRIMARY HYPERTENSION: Primary | ICD-10-CM

## 2023-08-10 RX ORDER — LISINOPRIL 10 MG/1
10 TABLET ORAL DAILY
Qty: 90 TABLET | Refills: 3 | Status: SHIPPED | OUTPATIENT
Start: 2023-08-10 | End: 2024-08-26

## 2023-08-10 NOTE — TELEPHONE ENCOUNTER
New Medication Request        What medication are you requesting?: Lisinopril    Reason for medication request: pt having High BP readings    Have you taken this medication before?: Yes: pt taken off due to low BP readings -pt seen by Dr. Melgar on 8/1/2023    Controlled Substance Agreement on file:   CSA -- Patient Level:    CSA: None found at the patient level.         Patient offered an appointment? No    Preferred Pharmacy:       Could we send this information to you in [employer] or would you prefer to receive a phone call?:   No preference   Okay to leave a detailed message?: Yes at Cell number on file:    Telephone Information:   Mobile [phone]

## 2023-08-13 ENCOUNTER — HEALTH MAINTENANCE LETTER (OUTPATIENT)
Age: 48
End: 2023-08-13

## 2023-09-01 DIAGNOSIS — E10.9 TYPE 1 DIABETES MELLITUS WITHOUT COMPLICATION (H): ICD-10-CM

## 2023-09-01 RX ORDER — INSULIN LISPRO 100 [IU]/ML
INJECTION, SOLUTION INTRAVENOUS; SUBCUTANEOUS
Qty: 90 ML | Refills: 1 | Status: SHIPPED | OUTPATIENT
Start: 2023-09-01 | End: 2024-04-29

## 2023-09-01 NOTE — TELEPHONE ENCOUNTER
"Routing refill request to provider for review/approval because:  Should have refills on file, RN did not refuse as this is an essential med for type 1 diabetes    Last Written Prescription Date:  7/26/23  Last Fill Quantity: 90,  # refills: 3   Last office visit provider:   8/1/23 with araceli    Requested Prescriptions   Pending Prescriptions Disp Refills    insulin lispro (HUMALOG) 100 UNIT/ML vial [Pharmacy Med Name: HUMALOG 100UNIT/ML SOLN] 90 mL 1     Sig: USE UP TO 75 UNITS VIA PUMP ONCE DAILY       Short Acting Insulin Protocol Passed - 9/1/2023  4:25 PM        Passed - Serum creatinine on file in past 12 months     Recent Labs   Lab Test 11/10/22  1509   CR 1.18*       Ok to refill medication if creatinine is low          Passed - HgbA1C in past 3 or 6 months     If HgbA1C is 8 or greater, it needs to be on file within the past 3 months.  If less than 8, must be on file within the past 6 months.     Recent Labs   Lab Test 07/26/23  0746   A1C 9.4*             Passed - Medication is active on med list        Passed - Patient is age 18 or older        Passed - Recent (6 mo) or future (30 days) visit within the authorizing provider's specialty     Patient had office visit in the last 6 months or has a visit in the next 30 days with authorizing provider or within the authorizing provider's specialty.  See \"Patient Info\" tab in inbasket, or \"Choose Columns\" in Meds & Orders section of the refill encounter.                 CARL FERNANDEZ RN 09/01/23 4:25 PM  "

## 2023-11-25 ENCOUNTER — TRANSFERRED RECORDS (OUTPATIENT)
Dept: HEALTH INFORMATION MANAGEMENT | Facility: CLINIC | Age: 48
End: 2023-11-25
Payer: COMMERCIAL

## 2023-11-25 LAB
ALT SERPL-CCNC: 63 U/L
AST SERPL-CCNC: 75 U/L (ref 17–59)
CREATININE (EXTERNAL): 1.1 MG/DL (ref 0.7–1.4)
GFR ESTIMATED (EXTERNAL): >60 ML/MIN/1.73M2
GLUCOSE (EXTERNAL): 127 MG/DL (ref 60–99)
POTASSIUM (EXTERNAL): 4.2 MMOL/L (ref 3.5–5.1)

## 2023-12-05 PROBLEM — R91.8 PULMONARY NODULES: Status: ACTIVE | Noted: 2023-12-05

## 2023-12-06 ENCOUNTER — OFFICE VISIT (OUTPATIENT)
Dept: FAMILY MEDICINE | Facility: CLINIC | Age: 48
End: 2023-12-06
Payer: COMMERCIAL

## 2023-12-06 VITALS
HEIGHT: 65 IN | RESPIRATION RATE: 16 BRPM | OXYGEN SATURATION: 95 % | BODY MASS INDEX: 28.99 KG/M2 | HEART RATE: 75 BPM | DIASTOLIC BLOOD PRESSURE: 56 MMHG | TEMPERATURE: 98.3 F | WEIGHT: 174 LBS | SYSTOLIC BLOOD PRESSURE: 104 MMHG

## 2023-12-06 DIAGNOSIS — I10 PRIMARY HYPERTENSION: ICD-10-CM

## 2023-12-06 DIAGNOSIS — E10.42 TYPE 1 DIABETES MELLITUS WITH DIABETIC POLYNEUROPATHY (H): Primary | ICD-10-CM

## 2023-12-06 DIAGNOSIS — R10.11 RUQ ABDOMINAL PAIN: ICD-10-CM

## 2023-12-06 DIAGNOSIS — R31.9 HEMATURIA, UNSPECIFIED TYPE: ICD-10-CM

## 2023-12-06 LAB
ALBUMIN SERPL BCG-MCNC: 4.4 G/DL (ref 3.5–5.2)
ALBUMIN UR-MCNC: NEGATIVE MG/DL
ALP SERPL-CCNC: 83 U/L (ref 40–150)
ALT SERPL W P-5'-P-CCNC: 22 U/L (ref 0–70)
AMYLASE SERPL-CCNC: 55 U/L (ref 28–100)
ANION GAP SERPL CALCULATED.3IONS-SCNC: 9 MMOL/L (ref 7–15)
APPEARANCE UR: CLEAR
AST SERPL W P-5'-P-CCNC: 29 U/L (ref 0–45)
BILIRUB SERPL-MCNC: 0.2 MG/DL
BILIRUB UR QL STRIP: NEGATIVE
BUN SERPL-MCNC: 25.5 MG/DL (ref 6–20)
CALCIUM SERPL-MCNC: 9.7 MG/DL (ref 8.6–10)
CHLORIDE SERPL-SCNC: 99 MMOL/L (ref 98–107)
CHOLEST SERPL-MCNC: 128 MG/DL
COLOR UR AUTO: YELLOW
CREAT SERPL-MCNC: 1.3 MG/DL (ref 0.67–1.17)
CREAT UR-MCNC: 115 MG/DL
DEPRECATED HCO3 PLAS-SCNC: 28 MMOL/L (ref 22–29)
EGFRCR SERPLBLD CKD-EPI 2021: 67 ML/MIN/1.73M2
ERYTHROCYTE [DISTWIDTH] IN BLOOD BY AUTOMATED COUNT: 12.7 % (ref 10–15)
FASTING STATUS PATIENT QL REPORTED: NO
GLUCOSE SERPL-MCNC: 300 MG/DL (ref 70–99)
GLUCOSE UR STRIP-MCNC: 500 MG/DL
HBA1C MFR BLD: 9.3 % (ref 0–5.6)
HCT VFR BLD AUTO: 37.2 % (ref 40–53)
HDLC SERPL-MCNC: 43 MG/DL
HGB BLD-MCNC: 12.4 G/DL (ref 13.3–17.7)
HGB UR QL STRIP: NEGATIVE
KETONES UR STRIP-MCNC: NEGATIVE MG/DL
LDLC SERPL CALC-MCNC: 59 MG/DL
LEUKOCYTE ESTERASE UR QL STRIP: NEGATIVE
MCH RBC QN AUTO: 30.2 PG (ref 26.5–33)
MCHC RBC AUTO-ENTMCNC: 33.3 G/DL (ref 31.5–36.5)
MCV RBC AUTO: 91 FL (ref 78–100)
MICROALBUMIN UR-MCNC: <12 MG/L
MICROALBUMIN/CREAT UR: NORMAL MG/G{CREAT}
NITRATE UR QL: NEGATIVE
NONHDLC SERPL-MCNC: 85 MG/DL
PH UR STRIP: 5.5 [PH] (ref 5–7)
PLATELET # BLD AUTO: 292 10E3/UL (ref 150–450)
POTASSIUM SERPL-SCNC: 5 MMOL/L (ref 3.4–5.3)
PROT SERPL-MCNC: 6.9 G/DL (ref 6.4–8.3)
RBC # BLD AUTO: 4.11 10E6/UL (ref 4.4–5.9)
SODIUM SERPL-SCNC: 136 MMOL/L (ref 135–145)
SP GR UR STRIP: 1.02 (ref 1–1.03)
TRIGL SERPL-MCNC: 131 MG/DL
UROBILINOGEN UR STRIP-ACNC: 0.2 E.U./DL
WBC # BLD AUTO: 8.1 10E3/UL (ref 4–11)

## 2023-12-06 PROCEDURE — 82570 ASSAY OF URINE CREATININE: CPT | Performed by: NURSE PRACTITIONER

## 2023-12-06 PROCEDURE — 90471 IMMUNIZATION ADMIN: CPT | Performed by: NURSE PRACTITIONER

## 2023-12-06 PROCEDURE — 82043 UR ALBUMIN QUANTITATIVE: CPT | Performed by: NURSE PRACTITIONER

## 2023-12-06 PROCEDURE — 81003 URINALYSIS AUTO W/O SCOPE: CPT | Mod: QW | Performed by: NURSE PRACTITIONER

## 2023-12-06 PROCEDURE — 90686 IIV4 VACC NO PRSV 0.5 ML IM: CPT | Performed by: NURSE PRACTITIONER

## 2023-12-06 PROCEDURE — 36415 COLL VENOUS BLD VENIPUNCTURE: CPT | Performed by: NURSE PRACTITIONER

## 2023-12-06 PROCEDURE — 82150 ASSAY OF AMYLASE: CPT | Performed by: NURSE PRACTITIONER

## 2023-12-06 PROCEDURE — 80061 LIPID PANEL: CPT | Performed by: NURSE PRACTITIONER

## 2023-12-06 PROCEDURE — 83036 HEMOGLOBIN GLYCOSYLATED A1C: CPT | Performed by: NURSE PRACTITIONER

## 2023-12-06 PROCEDURE — 80053 COMPREHEN METABOLIC PANEL: CPT | Performed by: NURSE PRACTITIONER

## 2023-12-06 PROCEDURE — 99214 OFFICE O/P EST MOD 30 MIN: CPT | Mod: 25 | Performed by: NURSE PRACTITIONER

## 2023-12-06 PROCEDURE — 85027 COMPLETE CBC AUTOMATED: CPT | Performed by: NURSE PRACTITIONER

## 2023-12-06 NOTE — PROGRESS NOTES
Assessment & Plan     (E10.42) Type 1 diabetes mellitus with diabetic polyneuropathy (H)  (primary encounter diagnosis)  Comment: Diabetes seems controlled and not responsible for his symptoms  Plan: HEMOGLOBIN A1C, Lipid panel reflex to direct         LDL Non-fasting, Albumin Random Urine         Quantitative with Creat Ratio            (I10) Primary hypertension  Comment: States this is where his blood pressure tends to run  Plan:     (R10.11) RUQ abdominal pain  Comment:   Plan: CBC with platelets, Comprehensive metabolic         panel, Amylase, US Abdomen Limited, omeprazole         (PRILOSEC) 20 MG DR capsule        Unclear if this is related to GERD type symptoms given that acidic foods are making this worse or if he does have a kidney stone as he certainly had CVA tenderness on the right side today.  He thinks his urinary symptoms have resolved.  Also possible to have gallbladder disease given family history and his report of stool changes.  Will set him up with right upper quadrant ultrasound and lab work, started on omeprazole and follow-up with his primary in 2 days as planned  (R31.9) Hematuria, unspecified type  Comment:   Plan: UA Macroscopic with reflex to Microscopic and         CultureKeerthi Will NP  [facility]    Nicki Kothari is a 48 year old, presenting for the following health issues:    Has sharp abdominal pain for 2 weeks, right side low. Started on the left side when he went to urgen care.  No fever, some vomiting, some diarrhea. Went to urgent care. Found blood in urine, thought kidney stone might have passed.   CT scan done, some bladder thickening, results reviewed with him today, normal appendix  Pain has moved to his RLQ from the left side  Red sauce/speghetti and eating in general makes it worse and morning is worse.  Smokes 1/2 ppd  Alcohol use, last drink was thanksgiving, alcohol maybe flared it, nothing since then  Two sisters with early  "gallbladder disease  Has used nothing for his symptoms including Tylenol or ibuprofen or any started PPI, he prefers not to use medications.  He has an appointment in 2 days with his primary Dr. Tommy Damon but was too uncomfortable to wait that long to be seen.    He has insulin-dependent diabetes, when he was at his worst the first couple days his sugars are very high but they are back in a reasonable range right now of approximately 120.  Abdominal Pain (On/off x few months. Eating makes worse. )      12/6/2023     2:33 PM   Additional Questions   Roomed by Charmaine       History of Present Illness       Reason for visit:  Right side pain in stomach  Symptom onset:  More than a month  Symptoms include:  Right side abdominal  Symptom intensity:  Moderate  Symptom progression:  Worsening  Had these symptoms before:  No  What makes it worse:  Eating food  What makes it better:  Not eating    He eats 2-3 servings of fruits and vegetables daily.He consumes 1 sweetened beverage(s) daily.He exercises with enough effort to increase his heart rate 10 to 19 minutes per day.  He exercises with enough effort to increase his heart rate 3 or less days per week.   He is taking medications regularly.                 Review of Systems         Objective    /56 (BP Location: Right arm, Patient Position: Sitting, Cuff Size: Adult Large)   Pulse 75   Temp 98.3  F (36.8  C) (Tympanic)   Resp 16   Ht 1.651 m (5' 5\")   Wt 78.9 kg (174 lb)   SpO2 95%   BMI 28.96 kg/m    Body mass index is 28.96 kg/m .  Physical Exam   GENERAL: healthy, alert and no distress  NECK: no adenopathy, no asymmetry, masses, or scars and thyroid normal to palpation  RESP: lungs clear to auscultation - no rales, rhonchi or wheezes  CV: regular rate and rhythm, normal S1 S2, no S3 or S4, no murmur, click or rub, no peripheral edema and peripheral pulses strong  MS: no gross musculoskeletal defects noted, no edema  SKIN: no suspicious lesions or " rashes  Abdomen soft with active bowel tones throughout  His insulin pump appears adequately attached  He is equally tender in all quadrants of his abdomen and there is also masses palpable and no hepatosplenomegaly

## 2023-12-08 ENCOUNTER — OFFICE VISIT (OUTPATIENT)
Dept: FAMILY MEDICINE | Facility: CLINIC | Age: 48
End: 2023-12-08
Payer: COMMERCIAL

## 2023-12-08 ENCOUNTER — HOSPITAL ENCOUNTER (OUTPATIENT)
Dept: ULTRASOUND IMAGING | Facility: CLINIC | Age: 48
Discharge: HOME | End: 2023-12-08
Attending: NURSE PRACTITIONER | Admitting: NURSE PRACTITIONER
Payer: COMMERCIAL

## 2023-12-08 VITALS
HEART RATE: 65 BPM | WEIGHT: 174 LBS | OXYGEN SATURATION: 100 % | BODY MASS INDEX: 28.99 KG/M2 | RESPIRATION RATE: 16 BRPM | HEIGHT: 65 IN | SYSTOLIC BLOOD PRESSURE: 108 MMHG | DIASTOLIC BLOOD PRESSURE: 70 MMHG

## 2023-12-08 DIAGNOSIS — Z00.00 HEALTHCARE MAINTENANCE: ICD-10-CM

## 2023-12-08 DIAGNOSIS — E10.42 TYPE 1 DIABETES MELLITUS WITH DIABETIC POLYNEUROPATHY (H): Primary | ICD-10-CM

## 2023-12-08 DIAGNOSIS — R10.11 RUQ ABDOMINAL PAIN: ICD-10-CM

## 2023-12-08 DIAGNOSIS — K80.50 BILIARY COLIC: ICD-10-CM

## 2023-12-08 DIAGNOSIS — I10 PRIMARY HYPERTENSION: ICD-10-CM

## 2023-12-08 DIAGNOSIS — G47.33 OBSTRUCTIVE SLEEP APNEA SYNDROME: ICD-10-CM

## 2023-12-08 DIAGNOSIS — R31.0 GROSS HEMATURIA: ICD-10-CM

## 2023-12-08 PROCEDURE — 99214 OFFICE O/P EST MOD 30 MIN: CPT | Performed by: INTERNAL MEDICINE

## 2023-12-08 PROCEDURE — 76705 ECHO EXAM OF ABDOMEN: CPT

## 2023-12-08 NOTE — PROGRESS NOTES
Assessment & Plan   Problem List Items Addressed This Visit          Nervous and Auditory    Type 1 diabetes mellitus with diabetic polyneuropathy (H) - Primary     uncontrolled (hyperglycemia)  hypoglycemic medications: none  insulin therapy: Medtronic insulin pump without CGMS   - ICR 1:6.5   - basal rate: 1.0   - sensitivity 30mg/dL   - historically working with Sarah  last HbA1c: 9.3%   microvascular complications: proliferative retinopathy, + DOROTHY, neuropathy   - doesn't think neuropathic pain needing medication at this time  macrovascular complications: none known  ASA/AIDEN/statin:  ASA 81mg daily, lisinopril 10mg, rosuvastatin 10mg daily    - smoking cessation counseling         Relevant Orders    Amb Adult Diabetes Educator Referral       Respiratory    Obstructive sleep apnea syndrome     10/30/2019 HST revealing severe sleep apnea with an AHI of 48.7 and oximetry eugenio 69%  APAP 6-16    Compliance report reveals excellent compliance and results.  Using his CPAP device approximately 7 hours per night and over 4 hours per night 89% of the time with an AHI of 1.7.                Circulatory    Primary hypertension     current antihypertensive regimen: lisinopril 10mg daily  regimen changes: none  intolerance:   future titration/work-up plan:            Urinary    Gross hematuria     11/2023: CT imaging with nonspecific bladder wall thickening  -Intermittent hematuria complaints  -Will need to continue to monitor, consideration for future urology referral            Other    Healthcare maintenance     - colonoscopy (4/2023): no polyps - recs for 10 yr follow-up           Biliary colic     11/25/2023: Urgent care presentation to [facility]   -CT abdomen pelvis demonstrating bladder wall thickening; suspected gallbladder sludge   -AST and ALT in the 50s, normal amylase and lipase  12/8/2023: Right upper quadrant ultrasound completed results still pending  -Symptom features sound very typical for biliary colic  -Will  "expedite referral to general surgery for assessment for elective cholecystectomy         Relevant Orders    Adult General Surg Referral             MED REC REQUIRED  Post Medication Reconciliation Status:         Simone Damon MD  [facility]    Nicki Kothari is a 48 year old, presenting for the following health issues: Presents for follow-up.  Seen in urgent care earlier this week related to biliary colic.  States that pains began on Thanksgiving evening persisting for 2 weeks.  Today is actually the first days felt better.  Describes postprandial pains beginning 30 to 60 minutes after eating.  Fairly typical right upper quadrant pains though will wrap around to his back.  Was seen in urgent care through [place].  Had CT imaging which showed generalized bladder wall thickening.  Questionable sludge in gallbladder.  Did have transient elevation in his AST and ALT.  Normal amylase and lipase.  More recently had his liver function studies retested which were normal.  Has been trying to avoid high-protein high-fat diet.  States has 2 sisters both of which have had cholecystectomies for similar situations.  Needing to get in with diabetic educator.  Using Medtronic pump without any signs for functionality.  Abdominal Pain (Follow up US ordered in  by Keerthi Will. )        12/8/2023     2:32 PM   Additional Questions   Roomed by Sana LEWIS       HPI         Review of Systems   + RUQ pains, nausea, blood in urine      Objective    /70   Pulse 65   Resp 16   Ht 1.651 m (5' 5\")   Wt 78.9 kg (174 lb)   SpO2 100%   BMI 28.96 kg/m    Body mass index is 28.96 kg/m .  Physical Exam   GENERAL: healthy, alert and no distress  NECK: no adenopathy, no asymmetry, masses, or scars and thyroid normal to palpation  RESP: lungs clear to auscultation - no rales, rhonchi or wheezes  CV: regular rate and rhythm, normal S1 S2, no S3 or S4, no murmur, click or rub, no peripheral edema and " peripheral pulses strong  ABDOMEN: soft, focal RUQ tenderness to palpation: + Robb's sign.  No epigastric pains  MS: no gross musculoskeletal defects noted, no edema    Office Visit on 12/06/2023   Component Date Value Ref Range Status    Hemoglobin A1C 12/06/2023 9.3 (H)  0.0 - 5.6 % Final    Normal <5.7%   Prediabetes 5.7-6.4%    Diabetes 6.5% or higher     Note: Adopted from ADA consensus guidelines.    Cholesterol 12/06/2023 128  <200 mg/dL Final    Triglycerides 12/06/2023 131  <150 mg/dL Final    Direct Measure HDL 12/06/2023 43  >=40 mg/dL Final    LDL Cholesterol Calculated 12/06/2023 59  <=100 mg/dL Final    Non HDL Cholesterol 12/06/2023 85  <130 mg/dL Final    Patient Fasting > 8hrs? 12/06/2023 No   Final    Creatinine Urine mg/dL 12/06/2023 115.0  mg/dL Final    The reference ranges have not been established in urine creatinine. The results should be integrated into the clinical context for interpretation.    Albumin Urine mg/L 12/06/2023 <12.0  mg/L Final    The reference ranges have not been established in urine albumin. The results should be integrated into the clinical context for interpretation.    Albumin Urine mg/g Cr 12/06/2023    Final    Unable to calculate, urine albumin and/or urine creatinine is outside detectable limits.  Microalbuminuria is defined as an albumin:creatinine ratio of 17 to 299 for males and 25 to 299 for females. A ratio of albumin:creatinine of 300 or higher is indicative of overt proteinuria.  Due to biologic variability, positive results should be confirmed by a second, first-morning random or 24-hour timed urine specimen. If there is discrepancy, a third specimen is recommended. When 2 out of 3 results are in the microalbuminuria range, this is evidence for incipient nephropathy and warrants increased efforts at glucose control, blood pressure control, and institution of therapy with an angiotensin-converting-enzyme (ACE) inhibitor (if the patient can tolerate it).       WBC Count 12/06/2023 8.1  4.0 - 11.0 10e3/uL Final    RBC Count 12/06/2023 4.11 (L)  4.40 - 5.90 10e6/uL Final    Hemoglobin 12/06/2023 12.4 (L)  13.3 - 17.7 g/dL Final    Hematocrit 12/06/2023 37.2 (L)  40.0 - 53.0 % Final    MCV 12/06/2023 91  78 - 100 fL Final    MCH 12/06/2023 30.2  26.5 - 33.0 pg Final    MCHC 12/06/2023 33.3  31.5 - 36.5 g/dL Final    RDW 12/06/2023 12.7  10.0 - 15.0 % Final    Platelet Count 12/06/2023 292  150 - 450 10e3/uL Final    Sodium 12/06/2023 136  135 - 145 mmol/L Final    Reference intervals for this test were updated on 09/26/2023 to more accurately reflect our healthy population. There may be differences in the flagging of prior results with similar values performed with this method. Interpretation of those prior results can be made in the context of the updated reference intervals.     Potassium 12/06/2023 5.0  3.4 - 5.3 mmol/L Final    Carbon Dioxide (CO2) 12/06/2023 28  22 - 29 mmol/L Final    Anion Gap 12/06/2023 9  7 - 15 mmol/L Final    Urea Nitrogen 12/06/2023 25.5 (H)  6.0 - 20.0 mg/dL Final    Creatinine 12/06/2023 1.30 (H)  0.67 - 1.17 mg/dL Final    GFR Estimate 12/06/2023 67  >60 mL/min/1.73m2 Final    Calcium 12/06/2023 9.7  8.6 - 10.0 mg/dL Final    Chloride 12/06/2023 99  98 - 107 mmol/L Final    Glucose 12/06/2023 300 (H)  70 - 99 mg/dL Final    Alkaline Phosphatase 12/06/2023 83  40 - 150 U/L Final    Reference intervals for this test were updated on 11/14/2023 to more accurately reflect our healthy population. There may be differences in the flagging of prior results with similar values performed with this method. Interpretation of those prior results can be made in the context of the updated reference intervals.    AST 12/06/2023 29  0 - 45 U/L Final    Reference intervals for this test were updated on 6/12/2023 to more accurately reflect our healthy population. There may be differences in the flagging of prior results with similar values performed with this  method. Interpretation of those prior results can be made in the context of the updated reference intervals.    ALT 12/06/2023 22  0 - 70 U/L Final    Reference intervals for this test were updated on 6/12/2023 to more accurately reflect our healthy population. There may be differences in the flagging of prior results with similar values performed with this method. Interpretation of those prior results can be made in the context of the updated reference intervals.      Protein Total 12/06/2023 6.9  6.4 - 8.3 g/dL Final    Albumin 12/06/2023 4.4  3.5 - 5.2 g/dL Final    Bilirubin Total 12/06/2023 0.2  <=1.2 mg/dL Final    Amylase 12/06/2023 55  28 - 100 U/L Final    Color Urine 12/06/2023 Yellow  Colorless, Straw, Light Yellow, Yellow Final    Appearance Urine 12/06/2023 Clear  Clear Final    Glucose Urine 12/06/2023 500 (A)  Negative mg/dL Final    Bilirubin Urine 12/06/2023 Negative  Negative Final    Ketones Urine 12/06/2023 Negative  Negative mg/dL Final    Specific Gravity Urine 12/06/2023 1.020  1.003 - 1.035 Final    Blood Urine 12/06/2023 Negative  Negative Final    pH Urine 12/06/2023 5.5  5.0 - 7.0 Final    Protein Albumin Urine 12/06/2023 Negative  Negative mg/dL Final    Urobilinogen Urine 12/06/2023 0.2  0.2, 1.0 E.U./dL Final    Nitrite Urine 12/06/2023 Negative  Negative Final    Leukocyte Esterase Urine 12/06/2023 Negative  Negative Final

## 2023-12-08 NOTE — ASSESSMENT & PLAN NOTE
uncontrolled (hyperglycemia)  hypoglycemic medications: none  insulin therapy: Medtronic insulin pump without CGMS   - ICR 1:6.5   - basal rate: 1.0   - sensitivity 30mg/dL   - historically working with Sarah  last HbA1c: 9.3%   microvascular complications: proliferative retinopathy, + DOROTHY, neuropathy   - doesn't think neuropathic pain needing medication at this time  macrovascular complications: none known  ASA/AIDEN/statin:  ASA 81mg daily, lisinopril 10mg, rosuvastatin 10mg daily    - smoking cessation counseling

## 2023-12-08 NOTE — ASSESSMENT & PLAN NOTE
11/2023: CT imaging with nonspecific bladder wall thickening  -Intermittent hematuria complaints  -Will need to continue to monitor, consideration for future urology referral

## 2023-12-08 NOTE — ASSESSMENT & PLAN NOTE
11/25/2023: Urgent care presentation to [place]   -CT abdomen pelvis demonstrating bladder wall thickening; suspected gallbladder sludge   -AST and ALT in the 50s, normal amylase and lipase  12/8/2023: Right upper quadrant ultrasound completed results still pending  -Symptom features sound very typical for biliary colic  -Will expedite referral to general surgery for assessment for elective cholecystectomy

## 2023-12-13 ENCOUNTER — OFFICE VISIT (OUTPATIENT)
Dept: SURGERY | Facility: CLINIC | Age: 48
End: 2023-12-13
Attending: INTERNAL MEDICINE
Payer: COMMERCIAL

## 2023-12-13 VITALS
WEIGHT: 174 LBS | BODY MASS INDEX: 28.99 KG/M2 | HEIGHT: 65 IN | HEART RATE: 90 BPM | DIASTOLIC BLOOD PRESSURE: 63 MMHG | SYSTOLIC BLOOD PRESSURE: 111 MMHG

## 2023-12-13 DIAGNOSIS — K80.50 BILIARY COLIC: ICD-10-CM

## 2023-12-13 PROCEDURE — 99204 OFFICE O/P NEW MOD 45 MIN: CPT | Performed by: SURGERY

## 2023-12-13 RX ORDER — CEFAZOLIN SODIUM/WATER 2 G/20 ML
2 SYRINGE (ML) INTRAVENOUS
Status: CANCELLED | OUTPATIENT
Start: 2023-12-22

## 2023-12-13 RX ORDER — CEFAZOLIN SODIUM/WATER 2 G/20 ML
2 SYRINGE (ML) INTRAVENOUS SEE ADMIN INSTRUCTIONS
Status: CANCELLED | OUTPATIENT
Start: 2023-12-22

## 2023-12-13 NOTE — LETTER
12/13/2023         RE: Chad A Liddle  [address] [facility] [address]        Dear Colleague,    Thank you for referring your patient, Chad A Liddle, to the [facility] AND BARIATRICS CARE [place]. Please see a copy of my visit note below.    HPI:  Chad A Liddle is a 48 year old male who was referred to me by Simone Damon for evaluation of right upper quadrant pain.  Patient has had a several month history of intermittent right upper quadrant and epigastric pain.  He has associated nausea and vomiting.  She has had multiple episodes.  These last generally few hours.  His worst episode is on Thanksgiving day that lasted overnight into the next morning.  He has had workup done through urgent care and through his primary care physician.  He reports that in urgent care had an elevated LFTs although these labs are not available.  He had a CT scan that showed possible gallbladder sludge.  Ultrasound was done that was essentially normal.  He continues to have symptoms with most meals.    Allergies:Simvastatin, Gluten meal, and Ampicillin    Past Medical History:   Diagnosis Date     Diabetes (H)      Hyperlipidemia LDL goal <100      Hypertension      Sleep apnea        Past Surgical History:   Procedure Laterality Date     COLONOSCOPY N/A 4/20/2023    Procedure: COLONOSCOPY;  Surgeon: Jackie Quintero MD;  Location:  GI     ORTHOPEDIC SURGERY      L hand ring finger   Prior abdominal surgeries    Current Outpatient Medications   Medication Sig Dispense Refill     aspirin (ASA) 325 MG EC tablet Take 325 mg by mouth daily       capromab pendetide with indium-111 (PROSTASCINT) radioisotope injection   0 Refill(s), Type: Maintenance       insulin lispro (HUMALOG) 100 UNIT/ML vial USE UP TO 75 UNITS VIA PUMP ONCE DAILY 90 mL 1     lisinopril (ZESTRIL) 10 MG tablet Take 1 tablet (10 mg) by mouth daily 90 tablet 3     omeprazole (PRILOSEC) 20 MG DR capsule Take 1 capsule (20 mg) by mouth  "daily 90 capsule 0     OTHER MEDICAL SUPPLIES   Cris Contour Next test strips, See Instructions, Instructions: testing 4-8 x/ day, Supply, # 800 EA, 3 Refill(s), Type: Maintenance, Pharmacy: [facility], keep on hold and pt will notify when needed, testing 4-8 x/ day, 65, in,...       OTHER MEDICAL SUPPLIES   one touch delica lancets, See Instructions, Instructions: change needle daily-testing up to 8 times daily, Supply, # 400 EA, 3 Refill(s), Type: Maintenance, Pharmacy: [facility], keep on hold and pt will notify when needed, baires...       rosuvastatin (CRESTOR) 10 MG tablet Take 1 tablet (10 mg) by mouth daily 90 tablet 3       Family History   Problem Relation Age of Onset     Sleep Apnea Father      Sleep Apnea Sister      Sleep Apnea Sister         reports that he has been smoking cigarettes. He has been exposed to tobacco smoke. He has never used smokeless tobacco. He reports current alcohol use. He reports current drug use. Drug: Marijuana.      /63   Pulse 90   Ht 1.651 m (5' 5\")   Wt 78.9 kg (174 lb)   BMI 28.96 kg/m    Body mass index is 28.96 kg/m .    EXAM:  GENERAL: Well developed male in NAD  HEENT: Pupils are round and reactive, sclera are anicteric.   NECK:  No obvious masses or deformities  CV: RRR  PULM: Lungs clear bilaterally.   ABDOMEN: Soft and nondistended.  Tender in his right upper quadrant.  NEURO: No obvious deficits noted.  EXT: No edema, no obvious deformities or any other abnormalities    IMAGES: Ultrasound personally reviewed.  No clear stones or sludge.  CT scan from 11/25 comments on possible sludge.  I cannot see these images myself.    Assessment/Plan:    Chad A Liddle is a 48 year old male presenting with right upper quadrant pain.  His symptoms really are classic for gallbladder pathology.  Unfortunately there is a bit of diagnostic uncertainty due to his imaging that is inconclusive.  I do think that his exam and history " would merit cholecystectomy with the understanding that this may not fix all his problems.  I did discuss the possibility of a HIDA scan that might demonstrate biliary dyskinesia the patient ultimately would like to proceed with surgery.  We discussed the details of cholecystectomy.  This included discussion of the risks of bleeding, infection, and injury to bile ducts and liver vasculature.  The patient consents to the operation.  We will attempt to get this scheduled for the end of the year as he will have insurance changes.         Narciso Fenton MD  General Surgeon  [facility]  Surgery [address] [address]  Office: [phone]      Again, thank you for allowing me to participate in the care of your patient.        Sincerely,        Narciso Fenton MD

## 2023-12-14 NOTE — PROGRESS NOTES
HPI:  Chad A Liddle is a 48 year old male who was referred to me by Simone Damon for evaluation of right upper quadrant pain.  Patient has had a several month history of intermittent right upper quadrant and epigastric pain.  He has associated nausea and vomiting.  She has had multiple episodes.  These last generally few hours.  His worst episode is on Thanksgiving day that lasted overnight into the next morning.  He has had workup done through urgent care and through his primary care physician.  He reports that in urgent care had an elevated LFTs although these labs are not available.  He had a CT scan that showed possible gallbladder sludge.  Ultrasound was done that was essentially normal.  He continues to have symptoms with most meals.    Allergies:Simvastatin, Gluten meal, and Ampicillin    Past Medical History:   Diagnosis Date    Diabetes (H)     Hyperlipidemia LDL goal <100     Hypertension     Sleep apnea        Past Surgical History:   Procedure Laterality Date    COLONOSCOPY N/A 4/20/2023    Procedure: COLONOSCOPY;  Surgeon: Jackie Quintero MD;  Location:  GI    ORTHOPEDIC SURGERY      L hand ring finger   Prior abdominal surgeries    Current Outpatient Medications   Medication Sig Dispense Refill    aspirin (ASA) 325 MG EC tablet Take 325 mg by mouth daily      capromab pendetide with indium-111 (PROSTASCINT) radioisotope injection   0 Refill(s), Type: Maintenance      insulin lispro (HUMALOG) 100 UNIT/ML vial USE UP TO 75 UNITS VIA PUMP ONCE DAILY 90 mL 1    lisinopril (ZESTRIL) 10 MG tablet Take 1 tablet (10 mg) by mouth daily 90 tablet 3    omeprazole (PRILOSEC) 20 MG DR capsule Take 1 capsule (20 mg) by mouth daily 90 capsule 0    OTHER MEDICAL SUPPLIES   Cris Contour Next test strips, See Instructions, Instructions: testing 4-8 x/ day, Supply, # 800 EA, 3 Refill(s), Type: Maintenance, Pharmacy: [facility], keep on hold and pt will notify when needed, testing 4-8 x/  "day, 65, in,...      OTHER MEDICAL SUPPLIES   one touch delica lancets, See Instructions, Instructions: change needle daily-testing up to 8 times daily, Supply, # 400 EA, 3 Refill(s), Type: Maintenance, Pharmacy: [facility], keep on hold and pt will notify when needed, viry...      rosuvastatin (CRESTOR) 10 MG tablet Take 1 tablet (10 mg) by mouth daily 90 tablet 3       Family History   Problem Relation Age of Onset    Sleep Apnea Father     Sleep Apnea Sister     Sleep Apnea Sister         reports that he has been smoking cigarettes. He has been exposed to tobacco smoke. He has never used smokeless tobacco. He reports current alcohol use. He reports current drug use. Drug: Marijuana.      /63   Pulse 90   Ht 1.651 m (5' 5\")   Wt 78.9 kg (174 lb)   BMI 28.96 kg/m    Body mass index is 28.96 kg/m .    EXAM:  GENERAL: Well developed male in NAD  HEENT: Pupils are round and reactive, sclera are anicteric.   NECK:  No obvious masses or deformities  CV: RRR  PULM: Lungs clear bilaterally.   ABDOMEN: Soft and nondistended.  Tender in his right upper quadrant.  NEURO: No obvious deficits noted.  EXT: No edema, no obvious deformities or any other abnormalities    IMAGES: Ultrasound personally reviewed.  No clear stones or sludge.  CT scan from 11/25 comments on possible sludge.  I cannot see these images myself.    Assessment/Plan:    Chad A Liddle is a 48 year old male presenting with right upper quadrant pain.  His symptoms really are classic for gallbladder pathology.  Unfortunately there is a bit of diagnostic uncertainty due to his imaging that is inconclusive.  I do think that his exam and history would merit cholecystectomy with the understanding that this may not fix all his problems.  I did discuss the possibility of a HIDA scan that might demonstrate biliary dyskinesia the patient ultimately would like to proceed with surgery.  We discussed the details of cholecystectomy.  This " included discussion of the risks of bleeding, infection, and injury to bile ducts and liver vasculature.  The patient consents to the operation.  We will attempt to get this scheduled for the end of the year as he will have insurance changes.         Narciso Fenton MD  General Surgeon  [facility]  Surgery [address] [address]  Office: [phone]

## 2023-12-19 ENCOUNTER — OFFICE VISIT (OUTPATIENT)
Dept: FAMILY MEDICINE | Facility: CLINIC | Age: 48
End: 2023-12-19
Payer: COMMERCIAL

## 2023-12-19 VITALS
DIASTOLIC BLOOD PRESSURE: 66 MMHG | SYSTOLIC BLOOD PRESSURE: 124 MMHG | OXYGEN SATURATION: 99 % | HEART RATE: 73 BPM | HEIGHT: 65 IN | RESPIRATION RATE: 16 BRPM | WEIGHT: 177 LBS | TEMPERATURE: 97.8 F | BODY MASS INDEX: 29.49 KG/M2

## 2023-12-19 DIAGNOSIS — E78.5 DYSLIPIDEMIA: ICD-10-CM

## 2023-12-19 DIAGNOSIS — E10.42 TYPE 1 DIABETES MELLITUS WITH DIABETIC POLYNEUROPATHY (H): ICD-10-CM

## 2023-12-19 DIAGNOSIS — K82.9 GALLBLADDER DISEASE: ICD-10-CM

## 2023-12-19 DIAGNOSIS — G47.33 OBSTRUCTIVE SLEEP APNEA SYNDROME: ICD-10-CM

## 2023-12-19 DIAGNOSIS — Z01.818 PREOP GENERAL PHYSICAL EXAM: Primary | ICD-10-CM

## 2023-12-19 DIAGNOSIS — I10 PRIMARY HYPERTENSION: ICD-10-CM

## 2023-12-19 PROCEDURE — 93000 ELECTROCARDIOGRAM COMPLETE: CPT | Performed by: FAMILY MEDICINE

## 2023-12-19 PROCEDURE — 99215 OFFICE O/P EST HI 40 MIN: CPT | Mod: 25 | Performed by: FAMILY MEDICINE

## 2023-12-19 NOTE — H&P (VIEW-ONLY)
[address] [address]  Phone: [phone]  Fax: [phone]  Primary Provider: Simone Damon  Pre-op Performing Provider: NEFTALI HERNANDEZ       PREOPERATIVE EVALUATION:  Today's date: 12/19/2023    Taye is a 48 year old, presenting for the following:  Pre-Op Exam        12/19/2023    10:58 AM   Additional Questions   Roomed by LA       Surgical Information:  Surgery/Procedure: CHOLECYSTECTOMY, LAPAROSCOPIC   Surgery Location: [facility]   Surgeon: Narciso Fenton MD   Surgery Date: 12/22/23  Time of Surgery: 0730  Where patient plans to recover: At home with family  Fax number for surgical facility: Note does not need to be faxed, will be available electronically in Epic.    Assessment & Plan     The proposed surgical procedure is considered INTERMEDIATE risk.    Preop general physical exam  Gall Bladder Disease  - EKG 12-lead complete w/read - Clinics    Obstructive sleep apnea syndrome  Controlled with CPAP    Type 1 diabetes mellitus with diabetic polyneuropathy (H)  Poorly controlled with last A1C 9.2    Dyslipidemia  Controlled    Primary hypertension  Controlled             Risks and Recommendations:  The patient has the following additional risks and recommendations for perioperative complications:   - Consult Hospitalist / IM to assist with post-op medical management  Diabetes:  - Patient is on insulin therapy; diabetic NPO guidelines provided and discussed.    Antiplatelet or Anticoagulation Medication Instructions:  Aspirin stopped 12/19/23    Additional Medication Instructions:   - Continuous Glucose Monitor (CGM): Patient was made aware on the day of surgery, they should be prepared to remove the Continuous Glucose Monitor (CGM) prior to the operation in order to avoid damage to the equipment during the procedure. The CGM will not be the source of glucose monitoring during the operation.  Continue insulin pump and  monitor per protocol Type 1 DM.    RECOMMENDATION:  APPROVAL GIVEN to proceed with proposed procedure, without further diagnostic evaluation.      40 minutes spent by me on the date of the encounter doing chart review, history and exam, documentation and further activities per the note      Subjective       HPI related to upcoming procedure: GB pain and symptomatic with any fatty meal.        12/19/2023    10:55 AM   Preop Questions   1. Have you ever had a heart attack or stroke? No   2. Have you ever had surgery on your heart or blood vessels, such as a stent placement, a coronary artery bypass, or surgery on an artery in your head, neck, heart, or legs? No   3. Do you have chest pain with activity? No   4. Do you have a history of  heart failure? UNKNOWN - Not Aware   5. Do you currently have a cold, bronchitis or symptoms of other infection? No   6. Do you have a cough, shortness of breath, or wheezing? No   7. Do you or anyone in your family have previous history of blood clots? UNKNOWN -    8. Do you or does anyone in your family have a serious bleeding problem such as prolonged bleeding following surgeries or cuts? No   9. Have you ever had problems with anemia or been told to take iron pills? No   10. Have you had any abnormal blood loss such as black, tarry or bloody stools? No   11. Have you ever had a blood transfusion? No   12. Are you willing to have a blood transfusion if it is medically needed before, during, or after your surgery? Yes   13. Have you or any of your relatives ever had problems with anesthesia? No   14. Do you have sleep apnea, excessive snoring or daytime drowsiness? YES - Uses CPAP   14a. Do you have a CPAP machine? No   15. Do you have any artifical heart valves or other implanted medical devices like a pacemaker, defibrillator, or continuous glucose monitor? No   16. Do you have artificial joints? No   17. Are you allergic to latex? No       Health Care Directive:  Patient does not  have a Health Care Directive or Living Will: Discussed advance care planning with patient; however, patient declined at this time.    Preoperative Review of :   reviewed - no record of controlled substances prescribed.      Status of Chronic Conditions:  DIABETES - Patient has a longstanding history of DiabetesType Type I . Patient is being treated with insulin pump and denies significant side effects. Control has been poor. Complicating factors include but are not limited to: hypertension, neuropath and retinopathy.     HYPERTENSION - Patient has longstanding history of HTN , currently denies any symptoms referable to elevated blood pressure. Specifically denies chest pain, palpitations, dyspnea, orthopnea, PND or peripheral edema. Blood pressure readings have been in normal range. Current medication regimen is as listed below. Patient denies any side effects of medication.     SLEEP PROBLEM - Patient has a longstanding history of snoring.. Patient has tried OTC medications with limited success.   PATIENT ON CPAP    Review of Systems  CONSTITUTIONAL: NEGATIVE for fever, chills, change in weight  ENT/MOUTH: NEGATIVE for ear, mouth and throat problems  RESP: NEGATIVE for significant cough or SOB  CV: NEGATIVE for chest pain, palpitations or peripheral edema  GI: POSITIVE for gas or bloating and Hx gallbladder trouble    Patient Active Problem List    Diagnosis Date Noted    Biliary colic 12/08/2023     Priority: Medium    Gross hematuria 12/08/2023     Priority: Medium    Pulmonary nodules 12/05/2023     Priority: Medium     3mm right sided nodule seen on 11/2023 ([facility])      Healthcare maintenance 11/10/2022     Priority: Medium    Primary hypertension 11/10/2022     Priority: Medium    Diabetic retinopathy (H) 06/15/2022     Priority: Medium    Dyslipidemia 06/15/2022     Priority: Medium    Erectile dysfunction 06/15/2022     Priority: Medium    Type 1 diabetes mellitus with diabetic  polyneuropathy (H) 06/15/2022     Priority: Medium    Obstructive sleep apnea syndrome 09/23/2019     Priority: Medium      Past Medical History:   Diagnosis Date    Diabetes (H)     Hyperlipidemia LDL goal <100     Hypertension     Sleep apnea      Past Surgical History:   Procedure Laterality Date    COLONOSCOPY N/A 4/20/2023    Procedure: COLONOSCOPY;  Surgeon: Jackie Quintero MD;  Location:  GI    ORTHOPEDIC SURGERY      L hand ring finger     Current Outpatient Medications   Medication Sig Dispense Refill    aspirin (ASA) 325 MG EC tablet Take 325 mg by mouth daily      capromab pendetide with indium-111 (PROSTASCINT) radioisotope injection   0 Refill(s), Type: Maintenance      insulin lispro (HUMALOG) 100 UNIT/ML vial USE UP TO 75 UNITS VIA PUMP ONCE DAILY 90 mL 1    lisinopril (ZESTRIL) 10 MG tablet Take 1 tablet (10 mg) by mouth daily 90 tablet 3    omeprazole (PRILOSEC) 20 MG DR capsule Take 1 capsule (20 mg) by mouth daily 90 capsule 0    OTHER MEDICAL SUPPLIES   Cris Contour Next test strips, See Instructions, Instructions: testing 4-8 x/ day, Supply, # 800 EA, 3 Refill(s), Type: Maintenance, Pharmacy: [facility], keep on hold and pt will notify when needed, testing 4-8 x/ day, 65, in,...      OTHER MEDICAL SUPPLIES   one touch delica lancets, See Instructions, Instructions: change needle daily-testing up to 8 times daily, Supply, # 400 EA, 3 Refill(s), Type: Maintenance, Pharmacy: [facility], keep on hold and pt will notify when needed, baires...      rosuvastatin (CRESTOR) 10 MG tablet Take 1 tablet (10 mg) by mouth daily 90 tablet 3       Allergies   Allergen Reactions    Simvastatin Muscle Pain (Myalgia)     Other reaction(s): myalgias    Gluten Meal Other (See Comments)    Ampicillin Rash        Social History     Tobacco Use    Smoking status: Every Day     Types: Cigarettes     Passive exposure: Current    Smokeless tobacco: Never   Substance Use  "Topics    Alcohol use: Yes     Comment: 6 drinks/ week     Family History   Problem Relation Age of Onset    Sleep Apnea Father     Sleep Apnea Sister     Sleep Apnea Sister      History   Drug Use    Types: Marijuana         Objective     /66 (BP Location: Right arm, Patient Position: Sitting, Cuff Size: Adult Large)   Pulse 73   Temp 97.8  F (36.6  C) (Tympanic)   Resp 16   Ht 1.651 m (5' 5\")   Wt 80.3 kg (177 lb)   SpO2 99%   BMI 29.45 kg/m      Physical Exam    GENERAL APPEARANCE: healthy, alert and no distress     EYES: EOMI,  PERRL     HENT: ear canals and TM's normal and nose and mouth without ulcers or lesions     NECK: no adenopathy, no asymmetry, masses, or scars and thyroid normal to palpation     RESP: lungs clear to auscultation - no rales, rhonchi or wheezes     CV: regular rates and rhythm, normal S1 S2, no S3 or S4 and no murmur, click or rub     ABDOMEN: bowel sounds normal and Tender over RUQ to deep palpation     MS: extremities normal- no gross deformities noted, no evidence of inflammation in joints, FROM in all extremities.     SKIN: no suspicious lesions or rashes     NEURO: Normal strength and tone, sensory exam grossly normal, mentation intact and speech normal     PSYCH: mentation appears normal. and affect normal/bright     LYMPHATICS: No cervical adenopathy    Recent Labs   Lab Test 12/06/23  1549 07/26/23  0746 11/10/22  1509 12/28/21  0821 12/28/21  0821   HGB 12.4*  --   --   --  12.5*     --   --   --  268     --  137  --  135   POTASSIUM 5.0  --  4.6  --  4.4   CR 1.30*  --  1.18*  --  1.19   A1C 9.3* 9.4* 9.7*   < >  --     < > = values in this interval not displayed.        Diagnostics:  Recent Results (from the past 720 hour(s))   HEMOGLOBIN A1C    Collection Time: 12/06/23  3:49 PM   Result Value Ref Range    Hemoglobin A1C 9.3 (H) 0.0 - 5.6 %   Lipid panel reflex to direct LDL Non-fasting    Collection Time: 12/06/23  3:49 PM   Result Value Ref Range    " Cholesterol 128 <200 mg/dL    Triglycerides 131 <150 mg/dL    Direct Measure HDL 43 >=40 mg/dL    LDL Cholesterol Calculated 59 <=100 mg/dL    Non HDL Cholesterol 85 <130 mg/dL    Patient Fasting > 8hrs? No    Albumin Random Urine Quantitative with Creat Ratio    Collection Time: 12/06/23  3:49 PM   Result Value Ref Range    Creatinine Urine mg/dL 115.0 mg/dL    Albumin Urine mg/L <12.0 mg/L    Albumin Urine mg/g Cr     CBC with platelets    Collection Time: 12/06/23  3:49 PM   Result Value Ref Range    WBC Count 8.1 4.0 - 11.0 10e3/uL    RBC Count 4.11 (L) 4.40 - 5.90 10e6/uL    Hemoglobin 12.4 (L) 13.3 - 17.7 g/dL    Hematocrit 37.2 (L) 40.0 - 53.0 %    MCV 91 78 - 100 fL    MCH 30.2 26.5 - 33.0 pg    MCHC 33.3 31.5 - 36.5 g/dL    RDW 12.7 10.0 - 15.0 %    Platelet Count 292 150 - 450 10e3/uL   Comprehensive metabolic panel    Collection Time: 12/06/23  3:49 PM   Result Value Ref Range    Sodium 136 135 - 145 mmol/L    Potassium 5.0 3.4 - 5.3 mmol/L    Carbon Dioxide (CO2) 28 22 - 29 mmol/L    Anion Gap 9 7 - 15 mmol/L    Urea Nitrogen 25.5 (H) 6.0 - 20.0 mg/dL    Creatinine 1.30 (H) 0.67 - 1.17 mg/dL    GFR Estimate 67 >60 mL/min/1.73m2    Calcium 9.7 8.6 - 10.0 mg/dL    Chloride 99 98 - 107 mmol/L    Glucose 300 (H) 70 - 99 mg/dL    Alkaline Phosphatase 83 40 - 150 U/L    AST 29 0 - 45 U/L    ALT 22 0 - 70 U/L    Protein Total 6.9 6.4 - 8.3 g/dL    Albumin 4.4 3.5 - 5.2 g/dL    Bilirubin Total 0.2 <=1.2 mg/dL   Amylase    Collection Time: 12/06/23  3:49 PM   Result Value Ref Range    Amylase 55 28 - 100 U/L   UA Macroscopic with reflex to Microscopic and Culture    Collection Time: 12/06/23  3:49 PM    Specimen: Urine, Clean Catch   Result Value Ref Range    Color Urine Yellow Colorless, Straw, Light Yellow, Yellow    Appearance Urine Clear Clear    Glucose Urine 500 (A) Negative mg/dL    Bilirubin Urine Negative Negative    Ketones Urine Negative Negative mg/dL    Specific Gravity Urine 1.020 1.003 - 1.035     Blood Urine Negative Negative    pH Urine 5.5 5.0 - 7.0    Protein Albumin Urine Negative Negative mg/dL    Urobilinogen Urine 0.2 0.2, 1.0 E.U./dL    Nitrite Urine Negative Negative    Leukocyte Esterase Urine Negative Negative      EKG: appears normal, NSR, normal axis, normal intervals, no acute ST/T changes c/w ischemia, no LVH by voltage criteria, unchanged from previous tracings, Prominent R(V1) -nonspecific. Borderline EKG    Revised Cardiac Risk Index (RCRI):  The patient has the following serious cardiovascular risks for perioperative complications:   - Diabetes Mellitus (on Insulin) = 1 point     RCRI Interpretation: 1 point: Class II (low risk - 0.9% complication rate)    Patient able to walk miles this fall phe[place]uo without symptoms.     Signed Electronically by: Aubrey Daniels MD  Copy of this evaluation report is provided to requesting physician.

## 2023-12-19 NOTE — PROGRESS NOTES
[address] [address]  Phone: [phone]  Fax: [phone]  Primary Provider: Simone Damon  Pre-op Performing Provider: NEFTALI HERNANDEZ       PREOPERATIVE EVALUATION:  Today's date: 12/19/2023    Taye is a 48 year old, presenting for the following:  Pre-Op Exam        12/19/2023    10:58 AM   Additional Questions   Roomed by LA       Surgical Information:  Surgery/Procedure: CHOLECYSTECTOMY, LAPAROSCOPIC   Surgery Location: [facility]   Surgeon: Narciso Fenton MD   Surgery Date: 12/22/23  Time of Surgery: 0730  Where patient plans to recover: At home with family  Fax number for surgical facility: Note does not need to be faxed, will be available electronically in Epic.    Assessment & Plan     The proposed surgical procedure is considered INTERMEDIATE risk.    Preop general physical exam  Gall Bladder Disease  - EKG 12-lead complete w/read - Clinics    Obstructive sleep apnea syndrome  Controlled with CPAP    Type 1 diabetes mellitus with diabetic polyneuropathy (H)  Poorly controlled with last A1C 9.2    Dyslipidemia  Controlled    Primary hypertension  Controlled             Risks and Recommendations:  The patient has the following additional risks and recommendations for perioperative complications:   - Consult Hospitalist / IM to assist with post-op medical management  Diabetes:  - Patient is on insulin therapy; diabetic NPO guidelines provided and discussed.    Antiplatelet or Anticoagulation Medication Instructions:  Aspirin stopped 12/19/23    Additional Medication Instructions:   - Continuous Glucose Monitor (CGM): Patient was made aware on the day of surgery, they should be prepared to remove the Continuous Glucose Monitor (CGM) prior to the operation in order to avoid damage to the equipment during the procedure. The CGM will not be the source of glucose monitoring during the operation.  Continue insulin pump and  monitor per protocol Type 1 DM.    RECOMMENDATION:  APPROVAL GIVEN to proceed with proposed procedure, without further diagnostic evaluation.      40 minutes spent by me on the date of the encounter doing chart review, history and exam, documentation and further activities per the note      Subjective       HPI related to upcoming procedure: GB pain and symptomatic with any fatty meal.        12/19/2023    10:55 AM   Preop Questions   1. Have you ever had a heart attack or stroke? No   2. Have you ever had surgery on your heart or blood vessels, such as a stent placement, a coronary artery bypass, or surgery on an artery in your head, neck, heart, or legs? No   3. Do you have chest pain with activity? No   4. Do you have a history of  heart failure? UNKNOWN - Not Aware   5. Do you currently have a cold, bronchitis or symptoms of other infection? No   6. Do you have a cough, shortness of breath, or wheezing? No   7. Do you or anyone in your family have previous history of blood clots? UNKNOWN -    8. Do you or does anyone in your family have a serious bleeding problem such as prolonged bleeding following surgeries or cuts? No   9. Have you ever had problems with anemia or been told to take iron pills? No   10. Have you had any abnormal blood loss such as black, tarry or bloody stools? No   11. Have you ever had a blood transfusion? No   12. Are you willing to have a blood transfusion if it is medically needed before, during, or after your surgery? Yes   13. Have you or any of your relatives ever had problems with anesthesia? No   14. Do you have sleep apnea, excessive snoring or daytime drowsiness? YES - Uses CPAP   14a. Do you have a CPAP machine? No   15. Do you have any artifical heart valves or other implanted medical devices like a pacemaker, defibrillator, or continuous glucose monitor? No   16. Do you have artificial joints? No   17. Are you allergic to latex? No       Health Care Directive:  Patient does not  have a Health Care Directive or Living Will: Discussed advance care planning with patient; however, patient declined at this time.    Preoperative Review of :   reviewed - no record of controlled substances prescribed.      Status of Chronic Conditions:  DIABETES - Patient has a longstanding history of DiabetesType Type I . Patient is being treated with insulin pump and denies significant side effects. Control has been poor. Complicating factors include but are not limited to: hypertension, neuropath and retinopathy.     HYPERTENSION - Patient has longstanding history of HTN , currently denies any symptoms referable to elevated blood pressure. Specifically denies chest pain, palpitations, dyspnea, orthopnea, PND or peripheral edema. Blood pressure readings have been in normal range. Current medication regimen is as listed below. Patient denies any side effects of medication.     SLEEP PROBLEM - Patient has a longstanding history of snoring.. Patient has tried OTC medications with limited success.   PATIENT ON CPAP    Review of Systems  CONSTITUTIONAL: NEGATIVE for fever, chills, change in weight  ENT/MOUTH: NEGATIVE for ear, mouth and throat problems  RESP: NEGATIVE for significant cough or SOB  CV: NEGATIVE for chest pain, palpitations or peripheral edema  GI: POSITIVE for gas or bloating and Hx gallbladder trouble    Patient Active Problem List    Diagnosis Date Noted    Biliary colic 12/08/2023     Priority: Medium    Gross hematuria 12/08/2023     Priority: Medium    Pulmonary nodules 12/05/2023     Priority: Medium     3mm right sided nodule seen on 11/2023 ([facility])      Healthcare maintenance 11/10/2022     Priority: Medium    Primary hypertension 11/10/2022     Priority: Medium    Diabetic retinopathy (H) 06/15/2022     Priority: Medium    Dyslipidemia 06/15/2022     Priority: Medium    Erectile dysfunction 06/15/2022     Priority: Medium    Type 1 diabetes mellitus with diabetic  polyneuropathy (H) 06/15/2022     Priority: Medium    Obstructive sleep apnea syndrome 09/23/2019     Priority: Medium      Past Medical History:   Diagnosis Date    Diabetes (H)     Hyperlipidemia LDL goal <100     Hypertension     Sleep apnea      Past Surgical History:   Procedure Laterality Date    COLONOSCOPY N/A 4/20/2023    Procedure: COLONOSCOPY;  Surgeon: Jackie Quintero MD;  Location:  GI    ORTHOPEDIC SURGERY      L hand ring finger     Current Outpatient Medications   Medication Sig Dispense Refill    aspirin (ASA) 325 MG EC tablet Take 325 mg by mouth daily      capromab pendetide with indium-111 (PROSTASCINT) radioisotope injection   0 Refill(s), Type: Maintenance      insulin lispro (HUMALOG) 100 UNIT/ML vial USE UP TO 75 UNITS VIA PUMP ONCE DAILY 90 mL 1    lisinopril (ZESTRIL) 10 MG tablet Take 1 tablet (10 mg) by mouth daily 90 tablet 3    omeprazole (PRILOSEC) 20 MG DR capsule Take 1 capsule (20 mg) by mouth daily 90 capsule 0    OTHER MEDICAL SUPPLIES   Cris Contour Next test strips, See Instructions, Instructions: testing 4-8 x/ day, Supply, # 800 EA, 3 Refill(s), Type: Maintenance, Pharmacy: [facility], keep on hold and pt will notify when needed, testing 4-8 x/ day, 65, in,...      OTHER MEDICAL SUPPLIES   one touch delica lancets, See Instructions, Instructions: change needle daily-testing up to 8 times daily, Supply, # 400 EA, 3 Refill(s), Type: Maintenance, Pharmacy: [facility], keep on hold and pt will notify when needed, baires...      rosuvastatin (CRESTOR) 10 MG tablet Take 1 tablet (10 mg) by mouth daily 90 tablet 3       Allergies   Allergen Reactions    Simvastatin Muscle Pain (Myalgia)     Other reaction(s): myalgias    Gluten Meal Other (See Comments)    Ampicillin Rash        Social History     Tobacco Use    Smoking status: Every Day     Types: Cigarettes     Passive exposure: Current    Smokeless tobacco: Never   Substance Use  "Topics    Alcohol use: Yes     Comment: 6 drinks/ week     Family History   Problem Relation Age of Onset    Sleep Apnea Father     Sleep Apnea Sister     Sleep Apnea Sister      History   Drug Use    Types: Marijuana         Objective     /66 (BP Location: Right arm, Patient Position: Sitting, Cuff Size: Adult Large)   Pulse 73   Temp 97.8  F (36.6  C) (Tympanic)   Resp 16   Ht 1.651 m (5' 5\")   Wt 80.3 kg (177 lb)   SpO2 99%   BMI 29.45 kg/m      Physical Exam    GENERAL APPEARANCE: healthy, alert and no distress     EYES: EOMI,  PERRL     HENT: ear canals and TM's normal and nose and mouth without ulcers or lesions     NECK: no adenopathy, no asymmetry, masses, or scars and thyroid normal to palpation     RESP: lungs clear to auscultation - no rales, rhonchi or wheezes     CV: regular rates and rhythm, normal S1 S2, no S3 or S4 and no murmur, click or rub     ABDOMEN: bowel sounds normal and Tender over RUQ to deep palpation     MS: extremities normal- no gross deformities noted, no evidence of inflammation in joints, FROM in all extremities.     SKIN: no suspicious lesions or rashes     NEURO: Normal strength and tone, sensory exam grossly normal, mentation intact and speech normal     PSYCH: mentation appears normal. and affect normal/bright     LYMPHATICS: No cervical adenopathy    Recent Labs   Lab Test 12/06/23  1549 07/26/23  0746 11/10/22  1509 12/28/21  0821 12/28/21  0821   HGB 12.4*  --   --   --  12.5*     --   --   --  268     --  137  --  135   POTASSIUM 5.0  --  4.6  --  4.4   CR 1.30*  --  1.18*  --  1.19   A1C 9.3* 9.4* 9.7*   < >  --     < > = values in this interval not displayed.        Diagnostics:  Recent Results (from the past 720 hour(s))   HEMOGLOBIN A1C    Collection Time: 12/06/23  3:49 PM   Result Value Ref Range    Hemoglobin A1C 9.3 (H) 0.0 - 5.6 %   Lipid panel reflex to direct LDL Non-fasting    Collection Time: 12/06/23  3:49 PM   Result Value Ref Range    " Cholesterol 128 <200 mg/dL    Triglycerides 131 <150 mg/dL    Direct Measure HDL 43 >=40 mg/dL    LDL Cholesterol Calculated 59 <=100 mg/dL    Non HDL Cholesterol 85 <130 mg/dL    Patient Fasting > 8hrs? No    Albumin Random Urine Quantitative with Creat Ratio    Collection Time: 12/06/23  3:49 PM   Result Value Ref Range    Creatinine Urine mg/dL 115.0 mg/dL    Albumin Urine mg/L <12.0 mg/L    Albumin Urine mg/g Cr     CBC with platelets    Collection Time: 12/06/23  3:49 PM   Result Value Ref Range    WBC Count 8.1 4.0 - 11.0 10e3/uL    RBC Count 4.11 (L) 4.40 - 5.90 10e6/uL    Hemoglobin 12.4 (L) 13.3 - 17.7 g/dL    Hematocrit 37.2 (L) 40.0 - 53.0 %    MCV 91 78 - 100 fL    MCH 30.2 26.5 - 33.0 pg    MCHC 33.3 31.5 - 36.5 g/dL    RDW 12.7 10.0 - 15.0 %    Platelet Count 292 150 - 450 10e3/uL   Comprehensive metabolic panel    Collection Time: 12/06/23  3:49 PM   Result Value Ref Range    Sodium 136 135 - 145 mmol/L    Potassium 5.0 3.4 - 5.3 mmol/L    Carbon Dioxide (CO2) 28 22 - 29 mmol/L    Anion Gap 9 7 - 15 mmol/L    Urea Nitrogen 25.5 (H) 6.0 - 20.0 mg/dL    Creatinine 1.30 (H) 0.67 - 1.17 mg/dL    GFR Estimate 67 >60 mL/min/1.73m2    Calcium 9.7 8.6 - 10.0 mg/dL    Chloride 99 98 - 107 mmol/L    Glucose 300 (H) 70 - 99 mg/dL    Alkaline Phosphatase 83 40 - 150 U/L    AST 29 0 - 45 U/L    ALT 22 0 - 70 U/L    Protein Total 6.9 6.4 - 8.3 g/dL    Albumin 4.4 3.5 - 5.2 g/dL    Bilirubin Total 0.2 <=1.2 mg/dL   Amylase    Collection Time: 12/06/23  3:49 PM   Result Value Ref Range    Amylase 55 28 - 100 U/L   UA Macroscopic with reflex to Microscopic and Culture    Collection Time: 12/06/23  3:49 PM    Specimen: Urine, Clean Catch   Result Value Ref Range    Color Urine Yellow Colorless, Straw, Light Yellow, Yellow    Appearance Urine Clear Clear    Glucose Urine 500 (A) Negative mg/dL    Bilirubin Urine Negative Negative    Ketones Urine Negative Negative mg/dL    Specific Gravity Urine 1.020 1.003 - 1.035     Blood Urine Negative Negative    pH Urine 5.5 5.0 - 7.0    Protein Albumin Urine Negative Negative mg/dL    Urobilinogen Urine 0.2 0.2, 1.0 E.U./dL    Nitrite Urine Negative Negative    Leukocyte Esterase Urine Negative Negative      EKG: appears normal, NSR, normal axis, normal intervals, no acute ST/T changes c/w ischemia, no LVH by voltage criteria, unchanged from previous tracings, Prominent R(V1) -nonspecific. Borderline EKG    Revised Cardiac Risk Index (RCRI):  The patient has the following serious cardiovascular risks for perioperative complications:   - Diabetes Mellitus (on Insulin) = 1 point     RCRI Interpretation: 1 point: Class II (low risk - 0.9% complication rate)    Patient able to walk miles this fall pheInstagarage without symptoms.     Signed Electronically by: Aubrey Daniels MD  Copy of this evaluation report is provided to requesting physician.

## 2023-12-21 ENCOUNTER — ANESTHESIA EVENT (OUTPATIENT)
Dept: SURGERY | Facility: HOSPITAL | Age: 48
End: 2023-12-21
Payer: COMMERCIAL

## 2023-12-22 ENCOUNTER — HOSPITAL ENCOUNTER (OUTPATIENT)
Facility: HOSPITAL | Age: 48
Discharge: HOME | End: 2023-12-22
Attending: SURGERY | Admitting: SURGERY
Payer: COMMERCIAL

## 2023-12-22 ENCOUNTER — ANESTHESIA (OUTPATIENT)
Dept: SURGERY | Facility: HOSPITAL | Age: 48
End: 2023-12-22
Payer: COMMERCIAL

## 2023-12-22 VITALS
HEART RATE: 73 BPM | BODY MASS INDEX: 27.87 KG/M2 | RESPIRATION RATE: 20 BRPM | TEMPERATURE: 97.9 F | SYSTOLIC BLOOD PRESSURE: 125 MMHG | OXYGEN SATURATION: 99 % | DIASTOLIC BLOOD PRESSURE: 59 MMHG | WEIGHT: 167.5 LBS

## 2023-12-22 DIAGNOSIS — Z90.49 S/P LAPAROSCOPIC CHOLECYSTECTOMY: Primary | ICD-10-CM

## 2023-12-22 LAB
FASTING STATUS PATIENT QL REPORTED: ABNORMAL
GLUCOSE BLDC GLUCOMTR-MCNC: 123 MG/DL (ref 70–99)
GLUCOSE BLDC GLUCOMTR-MCNC: 38 MG/DL (ref 70–99)
GLUCOSE BLDC GLUCOMTR-MCNC: 91 MG/DL (ref 70–99)
GLUCOSE SERPL-MCNC: 61 MG/DL (ref 70–99)

## 2023-12-22 PROCEDURE — 258N000003 HC RX IP 258 OP 636: Performed by: ANESTHESIOLOGY

## 2023-12-22 PROCEDURE — 82947 ASSAY GLUCOSE BLOOD QUANT: CPT | Performed by: ANESTHESIOLOGY

## 2023-12-22 PROCEDURE — 258N000003 HC RX IP 258 OP 636: Performed by: NURSE ANESTHETIST, CERTIFIED REGISTERED

## 2023-12-22 PROCEDURE — 88304 TISSUE EXAM BY PATHOLOGIST: CPT | Mod: TC | Performed by: SURGERY

## 2023-12-22 PROCEDURE — 360N000076 HC SURGERY LEVEL 3, PER MIN: Performed by: SURGERY

## 2023-12-22 PROCEDURE — 250N000009 HC RX 250: Performed by: NURSE ANESTHETIST, CERTIFIED REGISTERED

## 2023-12-22 PROCEDURE — 250N000011 HC RX IP 250 OP 636: Performed by: NURSE ANESTHETIST, CERTIFIED REGISTERED

## 2023-12-22 PROCEDURE — 250N000013 HC RX MED GY IP 250 OP 250 PS 637: Performed by: ANESTHESIOLOGY

## 2023-12-22 PROCEDURE — 710N000009 HC RECOVERY PHASE 1, LEVEL 1, PER MIN: Performed by: SURGERY

## 2023-12-22 PROCEDURE — 250N000025 HC SEVOFLURANE, PER MIN: Performed by: SURGERY

## 2023-12-22 PROCEDURE — 272N000001 HC OR GENERAL SUPPLY STERILE: Performed by: SURGERY

## 2023-12-22 PROCEDURE — 258N000003 HC RX IP 258 OP 636: Performed by: STUDENT IN AN ORGANIZED HEALTH CARE EDUCATION/TRAINING PROGRAM

## 2023-12-22 PROCEDURE — 47562 LAPAROSCOPIC CHOLECYSTECTOMY: CPT | Performed by: SURGERY

## 2023-12-22 PROCEDURE — 710N000012 HC RECOVERY PHASE 2, PER MINUTE: Performed by: SURGERY

## 2023-12-22 PROCEDURE — 36415 COLL VENOUS BLD VENIPUNCTURE: CPT | Performed by: ANESTHESIOLOGY

## 2023-12-22 PROCEDURE — 999N000141 HC STATISTIC PRE-PROCEDURE NURSING ASSESSMENT: Performed by: SURGERY

## 2023-12-22 PROCEDURE — 82962 GLUCOSE BLOOD TEST: CPT

## 2023-12-22 PROCEDURE — 370N000017 HC ANESTHESIA TECHNICAL FEE, PER MIN: Performed by: SURGERY

## 2023-12-22 PROCEDURE — 250N000011 HC RX IP 250 OP 636: Performed by: SURGERY

## 2023-12-22 PROCEDURE — 88304 TISSUE EXAM BY PATHOLOGIST: CPT | Mod: 26 | Performed by: PATHOLOGY

## 2023-12-22 PROCEDURE — 250N000011 HC RX IP 250 OP 636: Mod: JZ | Performed by: ANESTHESIOLOGY

## 2023-12-22 PROCEDURE — 258N000003 HC RX IP 258 OP 636: Performed by: SURGERY

## 2023-12-22 PROCEDURE — 250N000009 HC RX 250: Performed by: SURGERY

## 2023-12-22 RX ORDER — OXYCODONE HYDROCHLORIDE 5 MG/1
5 TABLET ORAL
Status: DISCONTINUED | OUTPATIENT
Start: 2023-12-22 | End: 2023-12-22 | Stop reason: HOSPADM

## 2023-12-22 RX ORDER — OXYCODONE HYDROCHLORIDE 5 MG/1
5 TABLET ORAL EVERY 4 HOURS PRN
Qty: 6 TABLET | Refills: 0 | Status: SHIPPED | OUTPATIENT
Start: 2023-12-22

## 2023-12-22 RX ORDER — KETAMINE HYDROCHLORIDE 10 MG/ML
INJECTION INTRAMUSCULAR; INTRAVENOUS PRN
Status: DISCONTINUED | OUTPATIENT
Start: 2023-12-22 | End: 2023-12-22

## 2023-12-22 RX ORDER — GLYCOPYRROLATE 0.2 MG/ML
INJECTION, SOLUTION INTRAMUSCULAR; INTRAVENOUS PRN
Status: DISCONTINUED | OUTPATIENT
Start: 2023-12-22 | End: 2023-12-22

## 2023-12-22 RX ORDER — FENTANYL CITRATE 50 UG/ML
50 INJECTION, SOLUTION INTRAMUSCULAR; INTRAVENOUS EVERY 5 MIN PRN
Status: DISCONTINUED | OUTPATIENT
Start: 2023-12-22 | End: 2023-12-22 | Stop reason: HOSPADM

## 2023-12-22 RX ORDER — ONDANSETRON 4 MG/1
4 TABLET, ORALLY DISINTEGRATING ORAL EVERY 30 MIN PRN
Status: DISCONTINUED | OUTPATIENT
Start: 2023-12-22 | End: 2023-12-22 | Stop reason: HOSPADM

## 2023-12-22 RX ORDER — SODIUM CHLORIDE, SODIUM LACTATE, POTASSIUM CHLORIDE, AND CALCIUM CHLORIDE .6; .31; .03; .02 G/100ML; G/100ML; G/100ML; G/100ML
IRRIGANT IRRIGATION PRN
Status: DISCONTINUED | OUTPATIENT
Start: 2023-12-22 | End: 2023-12-22 | Stop reason: HOSPADM

## 2023-12-22 RX ORDER — CEFAZOLIN SODIUM/WATER 2 G/20 ML
2 SYRINGE (ML) INTRAVENOUS SEE ADMIN INSTRUCTIONS
Status: DISCONTINUED | OUTPATIENT
Start: 2023-12-22 | End: 2023-12-22 | Stop reason: HOSPADM

## 2023-12-22 RX ORDER — PROPOFOL 10 MG/ML
INJECTION, EMULSION INTRAVENOUS PRN
Status: DISCONTINUED | OUTPATIENT
Start: 2023-12-22 | End: 2023-12-22

## 2023-12-22 RX ORDER — FENTANYL CITRATE 50 UG/ML
25 INJECTION, SOLUTION INTRAMUSCULAR; INTRAVENOUS EVERY 5 MIN PRN
Status: DISCONTINUED | OUTPATIENT
Start: 2023-12-22 | End: 2023-12-22 | Stop reason: HOSPADM

## 2023-12-22 RX ORDER — BUPIVACAINE HYDROCHLORIDE AND EPINEPHRINE 2.5; 5 MG/ML; UG/ML
INJECTION, SOLUTION INFILTRATION; PERINEURAL PRN
Status: DISCONTINUED | OUTPATIENT
Start: 2023-12-22 | End: 2023-12-22 | Stop reason: HOSPADM

## 2023-12-22 RX ORDER — ONDANSETRON 2 MG/ML
4 INJECTION INTRAMUSCULAR; INTRAVENOUS EVERY 30 MIN PRN
Status: DISCONTINUED | OUTPATIENT
Start: 2023-12-22 | End: 2023-12-22 | Stop reason: HOSPADM

## 2023-12-22 RX ORDER — ACETAMINOPHEN 325 MG/1
975 TABLET ORAL ONCE
Status: COMPLETED | OUTPATIENT
Start: 2023-12-22 | End: 2023-12-22

## 2023-12-22 RX ORDER — SODIUM CHLORIDE, SODIUM LACTATE, POTASSIUM CHLORIDE, CALCIUM CHLORIDE 600; 310; 30; 20 MG/100ML; MG/100ML; MG/100ML; MG/100ML
INJECTION, SOLUTION INTRAVENOUS CONTINUOUS
Status: DISCONTINUED | OUTPATIENT
Start: 2023-12-22 | End: 2023-12-22 | Stop reason: HOSPADM

## 2023-12-22 RX ORDER — FENTANYL CITRATE 50 UG/ML
INJECTION, SOLUTION INTRAMUSCULAR; INTRAVENOUS PRN
Status: DISCONTINUED | OUTPATIENT
Start: 2023-12-22 | End: 2023-12-22

## 2023-12-22 RX ORDER — OXYCODONE HYDROCHLORIDE 5 MG/1
10 TABLET ORAL
Status: COMPLETED | OUTPATIENT
Start: 2023-12-22 | End: 2023-12-22

## 2023-12-22 RX ORDER — CEFAZOLIN SODIUM/WATER 2 G/20 ML
2 SYRINGE (ML) INTRAVENOUS
Status: COMPLETED | OUTPATIENT
Start: 2023-12-22 | End: 2023-12-22

## 2023-12-22 RX ORDER — ONDANSETRON 2 MG/ML
INJECTION INTRAMUSCULAR; INTRAVENOUS PRN
Status: DISCONTINUED | OUTPATIENT
Start: 2023-12-22 | End: 2023-12-22

## 2023-12-22 RX ORDER — LIDOCAINE HYDROCHLORIDE 10 MG/ML
INJECTION, SOLUTION INFILTRATION; PERINEURAL PRN
Status: DISCONTINUED | OUTPATIENT
Start: 2023-12-22 | End: 2023-12-22

## 2023-12-22 RX ORDER — MAGNESIUM SULFATE 4 G/50ML
4 INJECTION INTRAVENOUS ONCE
Status: COMPLETED | OUTPATIENT
Start: 2023-12-22 | End: 2023-12-22

## 2023-12-22 RX ORDER — LIDOCAINE 40 MG/G
CREAM TOPICAL
Status: DISCONTINUED | OUTPATIENT
Start: 2023-12-22 | End: 2023-12-22 | Stop reason: HOSPADM

## 2023-12-22 RX ORDER — DEXTROSE MONOHYDRATE 50 MG/ML
INJECTION, SOLUTION INTRAVENOUS CONTINUOUS
Status: DISCONTINUED | OUTPATIENT
Start: 2023-12-22 | End: 2023-12-22 | Stop reason: HOSPADM

## 2023-12-22 RX ORDER — HYDROMORPHONE HYDROCHLORIDE 1 MG/ML
0.2 INJECTION, SOLUTION INTRAMUSCULAR; INTRAVENOUS; SUBCUTANEOUS EVERY 5 MIN PRN
Status: DISCONTINUED | OUTPATIENT
Start: 2023-12-22 | End: 2023-12-22 | Stop reason: HOSPADM

## 2023-12-22 RX ORDER — HYDROMORPHONE HYDROCHLORIDE 1 MG/ML
0.4 INJECTION, SOLUTION INTRAMUSCULAR; INTRAVENOUS; SUBCUTANEOUS EVERY 5 MIN PRN
Status: DISCONTINUED | OUTPATIENT
Start: 2023-12-22 | End: 2023-12-22 | Stop reason: HOSPADM

## 2023-12-22 RX ADMIN — FENTANYL CITRATE 100 MCG: 50 INJECTION INTRAMUSCULAR; INTRAVENOUS at 07:33

## 2023-12-22 RX ADMIN — MIDAZOLAM 2 MG: 1 INJECTION INTRAMUSCULAR; INTRAVENOUS at 07:20

## 2023-12-22 RX ADMIN — OXYCODONE HYDROCHLORIDE 10 MG: 5 TABLET ORAL at 11:03

## 2023-12-22 RX ADMIN — LIDOCAINE HYDROCHLORIDE 5 ML: 10 INJECTION, SOLUTION INFILTRATION; PERINEURAL at 07:33

## 2023-12-22 RX ADMIN — HYDROMORPHONE HYDROCHLORIDE 0.5 MG: 1 INJECTION, SOLUTION INTRAMUSCULAR; INTRAVENOUS; SUBCUTANEOUS at 08:13

## 2023-12-22 RX ADMIN — SODIUM CHLORIDE, POTASSIUM CHLORIDE, SODIUM LACTATE AND CALCIUM CHLORIDE: 600; 310; 30; 20 INJECTION, SOLUTION INTRAVENOUS at 06:37

## 2023-12-22 RX ADMIN — PHENYLEPHRINE HYDROCHLORIDE 100 MCG: 10 INJECTION INTRAVENOUS at 07:55

## 2023-12-22 RX ADMIN — GLYCOPYRROLATE 0.2 MG: 0.2 INJECTION INTRAMUSCULAR; INTRAVENOUS at 07:55

## 2023-12-22 RX ADMIN — SUGAMMADEX 200 MG: 100 INJECTION, SOLUTION INTRAVENOUS at 08:09

## 2023-12-22 RX ADMIN — PHENYLEPHRINE HYDROCHLORIDE 100 MCG: 10 INJECTION INTRAVENOUS at 07:34

## 2023-12-22 RX ADMIN — MAGNESIUM SULFATE HEPTAHYDRATE 4 G: 80 INJECTION, SOLUTION INTRAVENOUS at 06:38

## 2023-12-22 RX ADMIN — HYDROMORPHONE HYDROCHLORIDE 0.5 MG: 1 INJECTION, SOLUTION INTRAMUSCULAR; INTRAVENOUS; SUBCUTANEOUS at 08:11

## 2023-12-22 RX ADMIN — ACETAMINOPHEN 975 MG: 325 TABLET ORAL at 06:42

## 2023-12-22 RX ADMIN — SODIUM CHLORIDE, POTASSIUM CHLORIDE, SODIUM LACTATE AND CALCIUM CHLORIDE: 600; 310; 30; 20 INJECTION, SOLUTION INTRAVENOUS at 08:35

## 2023-12-22 RX ADMIN — ROCURONIUM BROMIDE 50 MG: 50 INJECTION, SOLUTION INTRAVENOUS at 07:34

## 2023-12-22 RX ADMIN — ONDANSETRON 4 MG: 2 INJECTION INTRAMUSCULAR; INTRAVENOUS at 07:44

## 2023-12-22 RX ADMIN — KETAMINE HYDROCHLORIDE 40 MG: 10 INJECTION INTRAMUSCULAR; INTRAVENOUS at 07:33

## 2023-12-22 RX ADMIN — KETAMINE HYDROCHLORIDE 10 MG: 10 INJECTION INTRAMUSCULAR; INTRAVENOUS at 07:47

## 2023-12-22 RX ADMIN — PROPOFOL 150 MG: 10 INJECTION, EMULSION INTRAVENOUS at 07:33

## 2023-12-22 RX ADMIN — Medication 2 G: at 07:25

## 2023-12-22 RX ADMIN — DEXTROSE MONOHYDRATE: 50 INJECTION, SOLUTION INTRAVENOUS at 07:17

## 2023-12-22 ASSESSMENT — ACTIVITIES OF DAILY LIVING (ADL)
ADLS_ACUITY_SCORE: 35

## 2023-12-22 NOTE — PROGRESS NOTES
Patient seen by Dr. Stacy and aware of blood glucose = 38 per finger stick.Blood glucose per lab draw done as ordered and awaiting for result. Patient comfortable , denies any pain , alert and oriented x4 and not in any form of distress. Continue plan of care.

## 2023-12-22 NOTE — ANESTHESIA PROCEDURE NOTES
Airway       Patient location during procedure: OR       Procedure Start/Stop Times: 12/22/2023 7:36 AM and 12/22/2023 7:37 AM  Staff -        CRNA: Adonis Bustos APRN CRNA       Performed By: CRNA  Consent for Airway        Urgency: elective  Indications and Patient Condition       Indications for airway management: mylene-procedural       Induction type:intravenous       Mask difficulty assessment: 1 - vent by mask    Final Airway Details       Final airway type: endotracheal airway       Successful airway: ETT - single  Endotracheal Airway Details        ETT size (mm): 7.5       Cuffed: yes       Successful intubation technique: video laryngoscopy       VL Blade Size: Glidescope 3       Grade View of Cords: 1       Adjucts: stylet       Position: Right       Measured from: lips       Secured at (cm): 22       Bite block used: None    Post intubation assessment        Placement verified by: capnometry, equal breath sounds and chest rise        Number of attempts at approach: 1       Number of other approaches attempted: 0       Secured with: tape       Ease of procedure: easy       Dentition: Intact and Unchanged    Medication(s) Administered   Medication Administration Time: 12/22/2023 7:36 AM

## 2023-12-22 NOTE — LETTER
[facility] PACU  [facility] [facility] [address]  Phone: [phone]  Fax: [phone]    December 22, 2023        Taye A Liddle  [address] [facility] [address]          To whom it may concern:    RE: Taye A Liddle    This patient had surgery on 12/22/2023. He will require one week off from work for his recovery. He will have a lifting restriction of 25 lbs or less until 1 month following the date of surgery.     Please contact me for questions or concerns.      Sincerely,        Narciso Fetnon MD  General Surgeon  [facility]  Surgery Clinic - [address]  [address]  [place] [address]  Office: [phone]

## 2023-12-22 NOTE — ANESTHESIA CARE TRANSFER NOTE
Patient: Taye LUNDBERG Liddle    Procedure: Procedure(s):  CHOLECYSTECTOMY, LAPAROSCOPIC       Diagnosis: Biliary colic [K80.50]  Diagnosis Additional Information: No value filed.    Anesthesia Type:   General     Note:    Oropharynx: oropharynx clear of all foreign objects  Level of Consciousness: awake  Oxygen Supplementation: face mask  Level of Supplemental Oxygen (L/min / FiO2): 6  Independent Airway: airway patency satisfactory and stable  Dentition: dentition unchanged  Vital Signs Stable: post-procedure vital signs reviewed and stable  Report to RN Given: handoff report given  Patient transferred to: PACU    Handoff Report: Identifed the Patient, Identified the Reponsible Provider, Reviewed the pertinent medical history, Discussed the surgical course, Reviewed Intra-OP anesthesia mangement and issues during anesthesia, Set expectations for post-procedure period and Allowed opportunity for questions and acknowledgement of understanding    Vitals:  Vitals Value Taken Time   /83    Temp 97.9    Pulse 74 12/22/23 0826   Resp 12 12/22/23 0826   SpO2 100 % 12/22/23 0826   Vitals shown include unfiled device data.    Electronically Signed By: DENNY Marinelli CRNA  December 22, 2023  8:27 AM

## 2023-12-22 NOTE — ANESTHESIA PREPROCEDURE EVALUATION
Anesthesia Pre-Procedure Evaluation    Patient: Chad A Liddle   MRN: [id number] : 1975        Procedure : Procedure(s):  CHOLECYSTECTOMY, LAPAROSCOPIC          Past Medical History:   Diagnosis Date    Diabetes (H)     ED (erectile dysfunction)     Hyperlipidemia LDL goal <100     Hypertension     Pulmonary nodules     Retinopathy     Sleep apnea     cpap    Type 1 diabetes mellitus with diabetic polyneuropathy (H)       Past Surgical History:   Procedure Laterality Date    COLONOSCOPY N/A 2023    Procedure: COLONOSCOPY;  Surgeon: Jackie Quintero MD;  Location:  GI    ORTHOPEDIC SURGERY      L hand ring finger      Allergies   Allergen Reactions    Simvastatin Muscle Pain (Myalgia)     Other reaction(s): myalgias    Gluten Meal Other (See Comments)    Ampicillin Rash      Social History     Tobacco Use    Smoking status: Every Day     Types: Cigarettes     Passive exposure: Current    Smokeless tobacco: Never   Substance Use Topics    Alcohol use: Yes     Comment: 6 drinks/ week      Wt Readings from Last 1 Encounters:   23 80.3 kg (177 lb)        Anesthesia Evaluation   Pt has had prior anesthetic.     No history of anesthetic complications       ROS/MED HX  ENT/Pulmonary:     (+) sleep apnea, uses CPAP,                                      Neurologic:  - neg neurologic ROS     Cardiovascular:     (+) Dyslipidemia hypertension- -   -  - -                                      METS/Exercise Tolerance: >4 METS    Hematologic:  - neg hematologic  ROS     Musculoskeletal:  - neg musculoskeletal ROS     GI/Hepatic: Comment: Biliary colic      Renal/Genitourinary:  - neg Renal ROS     Endo:     (+) type I DM,    Using insulin,    Diabetic complications: retinopathy.             Psychiatric/Substance Use:       Infectious Disease:       Malignancy:       Other:            Physical Exam    Airway        Mallampati: III   TM distance: > 3 FB   Neck ROM: limited   Mouth opening: > 3  "cm    Respiratory Devices and Support         Dental     Comment: good        Cardiovascular   cardiovascular exam normal          Pulmonary   pulmonary exam normal              OUTSIDE LABS:  CBC:   Lab Results   Component Value Date    WBC 8.1 12/06/2023    WBC 8.7 12/28/2021    HGB 12.4 (L) 12/06/2023    HGB 12.5 (L) 12/28/2021    HCT 37.2 (L) 12/06/2023    HCT 37.5 (L) 12/28/2021     12/06/2023     12/28/2021     BMP:   Lab Results   Component Value Date     12/06/2023     11/10/2022    POTASSIUM 5.0 12/06/2023    POTASSIUM 4.6 11/10/2022    CHLORIDE 99 12/06/2023    CHLORIDE 102 11/10/2022    CO2 28 12/06/2023    CO2 24 11/10/2022    BUN 25.5 (H) 12/06/2023    BUN 15.9 11/10/2022    CR 1.30 (H) 12/06/2023    CR 1.18 (H) 11/10/2022     (H) 12/06/2023    GLC 44 (LL) 04/20/2023     COAGS: No results found for: \"PTT\", \"INR\", \"FIBR\"  POC: No results found for: \"BGM\", \"HCG\", \"HCGS\"  HEPATIC:   Lab Results   Component Value Date    ALBUMIN 4.4 12/06/2023    PROTTOTAL 6.9 12/06/2023    ALT 22 12/06/2023    AST 29 12/06/2023    ALKPHOS 83 12/06/2023    BILITOTAL 0.2 12/06/2023     OTHER:   Lab Results   Component Value Date    A1C 9.3 (H) 12/06/2023    VERO 9.7 12/06/2023    AMYLASE 55 12/06/2023       Anesthesia Plan    ASA Status:  3    NPO Status:  NPO Appropriate    Anesthesia Type: General.     - Airway: ETT   Induction: Intravenous, Propofol.   Maintenance: Balanced.   Techniques and Equipment:     - Airway: Video-Laryngoscope       Consents    Anesthesia Plan(s) and associated risks, benefits, and realistic alternatives discussed. Questions answered and patient/representative(s) expressed understanding.     - Discussed: Risks, Benefits and Alternatives for BOTH SEDATION and the PROCEDURE were discussed     - Discussed with:  Patient      - Extended Intubation/Ventilatory Support Discussed: No.      - Patient is DNR/DNI Status: No     Use of blood products discussed: Yes.     - " "Discussed with: Patient.     - Consented: consented to blood products     Postoperative Care    Pain management: Multi-modal analgesia.   PONV prophylaxis: Background Propofol Infusion, Ondansetron (or other 5HT-3)     Comments:    Other Comments: Pt will keep his basal insulin at 1 unit/hour.  Start D5W at 50 mL/hour.  Recheck BS hourly.      Glidescope    Background propofol  Ketamine   Magnesium             Missael Stacy MD    I have reviewed the pertinent notes and labs in the chart from the past 30 days and (re)examined the patient.  Any updates or changes from those notes are reflected in this note.              # DMII: A1C = 9.3 % (Ref range: 0.0 - 5.6 %) within past 6 months  # Overweight: Estimated body mass index is 29.45 kg/m  as calculated from the following:    Height as of 12/19/23: 1.651 m (5' 5\").    Weight as of 12/19/23: 80.3 kg (177 lb).    "

## 2023-12-22 NOTE — ANESTHESIA POSTPROCEDURE EVALUATION
Patient: Chad A Liddle    Procedure: Procedure(s):  CHOLECYSTECTOMY, LAPAROSCOPIC       Anesthesia Type:  General    Note:  Disposition: Outpatient   Postop Pain Control: Uneventful            Sign Out: Well controlled pain   PONV: No   Neuro/Psych: Uneventful            Sign Out: Acceptable/Baseline neuro status   Airway/Respiratory: Uneventful            Sign Out: Acceptable/Baseline resp. status   CV/Hemodynamics: Uneventful            Sign Out: Acceptable CV status; No obvious hypovolemia; No obvious fluid overload   Other NRE: NONE   DID A NON-ROUTINE EVENT OCCUR? No    Event details/Postop Comments:  Pt drank apple juice in PACU after BS 91.  Recheck in Phase II 38.  Pt drank several apple juices and self check now at 51.  Will recheck BS and discharge when above 80.             Last vitals:  Vitals Value Taken Time   /77 12/22/23 0915   Temp 36.6  C (97.9  F) 12/22/23 0823   Pulse 76 12/22/23 0917   Resp 13 12/22/23 0915   SpO2 100 % 12/22/23 0917   Vitals shown include unfiled device data.    Electronically Signed By: Missael Stacy MD  December 22, 2023  10:17 AM

## 2023-12-22 NOTE — PROGRESS NOTES
Blood glucose rechecked per [id number]. Dr. Stacy aware of the result and seen patient personally. Patient is ok to discharge to home.Patient not in distress and accompanied by wife.

## 2023-12-22 NOTE — INTERVAL H&P NOTE
"I have reviewed the surgical (or preoperative) H&P that is linked to this encounter, and examined the patient. There are no significant changes    Clinical Conditions Present on Arrival:  Clinically Significant Risk Factors Present on Admission                 # Drug Induced Platelet Defect: home medication list includes an antiplatelet medication  # DMII: A1C = 9.3 % (Ref range: 0.0 - 5.6 %) within past 6 months  # Overweight: Estimated body mass index is 27.87 kg/m  as calculated from the following:    Height as of 12/19/23: 1.651 m (5' 5\").    Weight as of this encounter: 76 kg (167 lb 8 oz).       "

## 2023-12-22 NOTE — OP NOTE
General Surgery Operative Note    Date of Surgery: 12/22/2023     Surgeon: Narciso Fenton MD    Assistant: None    Preoperative Diagnosis: Biliary colic    Postoperative Diagnosis: Biliary colic    Procedure: Laparoscopic cholecystectomy    EBL: 5 cc    Findings: Mild chronic inflammation of the gallbladder    Indications:  Patient is a 48-year-old man who presented to my clinic with signs and symptoms consistent with biliary colic.  He had a CT scan showing sludge.  After discussion of the risks and benefits of cholecystectomy, the patient consented to the operation.    Details of operation:  Patient was brought to the operating room and placed on the table in the supine position.  General anesthesia was initiated without incident.  The patient was prepped and draped in the usual sterile fashion.  A timeout for safety was performed.    I began with a supraumbilical incision and a Veress needle was introduced.  Pneumoperitoneum was established without incident.  A 5 mm optical port placed at the site.  There was no injury with entry.  I placed an 11 mm port in the subxiphoid region and two 5 mm ports in the right subcostal region.  Gallbladder was identified under the liver.  There is evidence of mild chronic inflammation.  It was grasped retracted cephalad.  I incised the visceral peritoneum near the infundibulum and dissected out the hepatocystic triangle.  Once I achieved the critical view of safety I clipped and divided the cystic artery and cystic duct..  The gallbladder was then removed from the liver bed using electrocautery.  It was placed in Endo Catch bag.  Hemostasis of the liver bed was assured using electrocautery.  The gallbladder was then removed from the abdomen in the Endo Catch bag.  The 11 mm port site fascia was closed with an 0 Vicryl suture on a suture passer device.  Pneumoperitoneum was released and all the ports were removed.  The skin was closed with 4-0 Monocryl sutures with skin glue  used as a dressing.  The patient tolerated the procedure well.  There were no immediately apparent complications.    Narciso Fenton MD  General Surgeon  [address] [address]  Office: [phone]

## 2023-12-26 LAB
PATH REPORT.COMMENTS IMP SPEC: NORMAL
PATH REPORT.COMMENTS IMP SPEC: NORMAL
PATH REPORT.FINAL DX SPEC: NORMAL
PATH REPORT.GROSS SPEC: NORMAL
PATH REPORT.MICROSCOPIC SPEC OTHER STN: NORMAL
PATH REPORT.RELEVANT HX SPEC: NORMAL
PHOTO IMAGE: NORMAL

## 2024-01-31 ENCOUNTER — TELEPHONE (OUTPATIENT)
Dept: FAMILY MEDICINE | Facility: CLINIC | Age: 49
End: 2024-01-31

## 2024-01-31 NOTE — TELEPHONE ENCOUNTER
General Call    Contacts         Type Contact Phone/Fax    01/31/2024 11:34 AM CST Phone (Incoming) [employer] (Other) [phone]          Reason for Call:  Please look out for forms from [employer] for this patient. Its a prescription for diabetic supplies.

## 2024-03-10 ENCOUNTER — HEALTH MAINTENANCE LETTER (OUTPATIENT)
Age: 49
End: 2024-03-10

## 2024-04-29 DIAGNOSIS — E10.9 TYPE 1 DIABETES MELLITUS WITHOUT COMPLICATION (H): ICD-10-CM

## 2024-04-29 RX ORDER — INSULIN LISPRO 100 [IU]/ML
INJECTION, SOLUTION INTRAVENOUS; SUBCUTANEOUS
Qty: 90 ML | Refills: 3 | Status: SHIPPED | OUTPATIENT
Start: 2024-04-29

## 2024-06-11 ENCOUNTER — OFFICE VISIT (OUTPATIENT)
Dept: FAMILY MEDICINE | Facility: CLINIC | Age: 49
End: 2024-06-11
Payer: COMMERCIAL

## 2024-06-11 VITALS
RESPIRATION RATE: 18 BRPM | HEART RATE: 69 BPM | HEIGHT: 65 IN | TEMPERATURE: 97.7 F | DIASTOLIC BLOOD PRESSURE: 72 MMHG | BODY MASS INDEX: 30.01 KG/M2 | SYSTOLIC BLOOD PRESSURE: 124 MMHG | WEIGHT: 180.1 LBS | OXYGEN SATURATION: 97 %

## 2024-06-11 DIAGNOSIS — M21.42 PES PLANUS OF BOTH FEET: ICD-10-CM

## 2024-06-11 DIAGNOSIS — M72.2 PLANTAR FASCIITIS: Primary | ICD-10-CM

## 2024-06-11 DIAGNOSIS — M21.41 PES PLANUS OF BOTH FEET: ICD-10-CM

## 2024-06-11 PROCEDURE — 99213 OFFICE O/P EST LOW 20 MIN: CPT | Performed by: PHYSICIAN ASSISTANT

## 2024-06-11 RX ORDER — MELOXICAM 15 MG/1
15 TABLET ORAL DAILY
Qty: 30 TABLET | Refills: 1 | Status: SHIPPED | OUTPATIENT
Start: 2024-06-11

## 2024-06-11 NOTE — PROGRESS NOTES
"  Assessment & Plan     (M72.2) Plantar fasciitis  (primary encounter diagnosis)  Comment: Worsening  Plan: meloxicam (MOBIC) 15 MG tablet, Physical         Therapy  Referral        Meloxicam Icing I gave him some exercises will refer to physical therapy for possible orthotics due to the severe pes planus and for the severe symptoms    (M21.41,  M21.42) Pes planus of both feet  Comment: Ongoing  Plan: meloxicam (MOBIC) 15 MG tablet, Physical         Therapy  Referral        See above follow-up as needed          BMI  Estimated body mass index is 29.97 kg/m  as calculated from the following:    Height as of this encounter: 1.651 m (5' 5\").    Weight as of this encounter: 81.7 kg (180 lb 1.6 oz).             Nicki Kothari is a 48 year old, presenting for the following health issues:  Foot Pain (Right foot pain for the past 4 days, possible plantar fasciitis /)        6/11/2024    12:44 PM   Additional Questions   Roomed by MACIEJ De La Cruz     Via the Health Maintenance questionnaire, the patient has reported the following services have been completed -Eye Exam: [facility] 2023-09-16, this information has been sent to the abstraction team.  48-year-old presents to clinic with complaint of right foot pain  It is in the right heel  Bothers worse first thing in the morning and then later in the day  He stands on concrete all day  No new foot gear  Worse past few days  He said no swelling  No erythema  He states he has had some more elevated blood sugars recently he has been diabetic for a number of years    History of Present Illness       Reason for visit:  Right foot pain  Symptom onset:  1-3 days ago  Symptoms include:  Heel and arch burning pain  Symptom intensity:  Severe  Symptom progression:  Staying the same  Had these symptoms before:  No  What makes it worse:  In the morning is the worse  What makes it better:  Streching    He eats 0-1 servings of fruits and vegetables " "daily.He consumes 1 sweetened beverage(s) daily.He exercises with enough effort to increase his heart rate 9 or less minutes per day.  He exercises with enough effort to increase his heart rate 3 or less days per week.   He is taking medications regularly.                     Objective    /72 (BP Location: Right arm, Patient Position: Sitting, Cuff Size: Adult Large)   Pulse 69   Temp 97.7  F (36.5  C) (Tympanic)   Resp 18   Ht 1.651 m (5' 5\")   Wt 81.7 kg (180 lb 1.6 oz)   SpO2 97%   BMI 29.97 kg/m    Body mass index is 29.97 kg/m .  Physical Exam patient's feet are without erythema he has some pes planus mild pronation intact dorsalis pedal pulse he has tenderness over the medial aspect of the calcaneal region minimal tenderness in the arch no other tenderness about the foot and ankle  Skin is intact              Signed Electronically by: EVER Vieyra    "

## 2024-07-27 ENCOUNTER — HEALTH MAINTENANCE LETTER (OUTPATIENT)
Age: 49
End: 2024-07-27

## 2024-08-26 DIAGNOSIS — E78.5 DYSLIPIDEMIA: ICD-10-CM

## 2024-08-26 DIAGNOSIS — I10 PRIMARY HYPERTENSION: ICD-10-CM

## 2024-08-26 RX ORDER — ROSUVASTATIN CALCIUM 10 MG/1
10 TABLET, COATED ORAL DAILY
Qty: 90 TABLET | Refills: 2 | Status: SHIPPED | OUTPATIENT
Start: 2024-08-26

## 2024-08-27 RX ORDER — LISINOPRIL 10 MG/1
10 TABLET ORAL DAILY
Qty: 90 TABLET | Refills: 3 | Status: SHIPPED | OUTPATIENT
Start: 2024-08-27

## 2024-09-16 ENCOUNTER — TRANSFERRED RECORDS (OUTPATIENT)
Dept: HEALTH INFORMATION MANAGEMENT | Facility: CLINIC | Age: 49
End: 2024-09-16
Payer: COMMERCIAL

## 2024-10-05 ENCOUNTER — HEALTH MAINTENANCE LETTER (OUTPATIENT)
Age: 49
End: 2024-10-05

## 2024-12-14 ENCOUNTER — HEALTH MAINTENANCE LETTER (OUTPATIENT)
Age: 49
End: 2024-12-14

## 2025-03-16 ENCOUNTER — HEALTH MAINTENANCE LETTER (OUTPATIENT)
Age: 50
End: 2025-03-16

## 2025-04-18 PROBLEM — R31.0 GROSS HEMATURIA: Status: RESOLVED | Noted: 2023-12-08 | Resolved: 2025-04-18

## 2025-05-07 ENCOUNTER — ALLIED HEALTH/NURSE VISIT (OUTPATIENT)
Dept: EDUCATION SERVICES | Facility: CLINIC | Age: 50
End: 2025-05-07
Attending: INTERNAL MEDICINE
Payer: COMMERCIAL

## 2025-05-07 VITALS — HEIGHT: 66 IN | BODY MASS INDEX: 30.67 KG/M2 | WEIGHT: 190.8 LBS

## 2025-05-07 DIAGNOSIS — E10.9 TYPE 1 DIABETES MELLITUS WITHOUT COMPLICATION (H): Primary | ICD-10-CM

## 2025-05-07 PROCEDURE — G0108 DIAB MANAGE TRN  PER INDIV: HCPCS | Performed by: DIETITIAN, REGISTERED

## 2025-05-07 PROCEDURE — 99207 PR DROP WITH A PROCEDURE: CPT | Performed by: DIETITIAN, REGISTERED

## 2025-05-07 RX ORDER — ACYCLOVIR 400 MG/1
1 TABLET ORAL
Qty: 9 EACH | Refills: 3 | Status: SHIPPED | OUTPATIENT
Start: 2025-05-07

## 2025-05-07 NOTE — LETTER
5/7/2025         RE: Chad A Liddle  [address] [facility] [address]        Dear Colleague,    Thank you for referring your patient, Chad A Liddle, to the [facility]. Please see a copy of my visit note below.    Diabetes Self-Management Education & Support    Presents for: Individual review Type 1 Diabetes with polyneuropathy    Type of Service: In Person Visit      Assessment  Pt last seen in 2019 and at that time pt was using [employer]tr[employer] pump and sensor.  Pt discontinued sensor use due to ongoing issues with sensor.  Pt is still using medtronic pump but no sensor.  Pt is here today to discuss pump and sensor options and next steps for ordering process.  Pt will likely proceed with Tandem Diabetes and Dexcom G7    During last consult pt recommended to try to be Gluten free due to GI complaints - issues have resolved and pt continues to be primarily gluten free     Patient's most recent   Lab Results   Component Value Date    A1C 9.3 12/06/2023     is not meeting goal of <7.0    Diabetes knowledge and skills assessment:   Patient is knowledgeable in diabetes management concepts related to: Healthy Eating, Being Active, and Healthy Coping    Based on learning assessment above, most appropriate setting for further diabetes education would be: Individual setting.    Care Plan and Education Provided:  Options for available for integrated pump and sensor options.      Healthy Eating: Balanced meals, Carbohydrate Counting, and Heart healthy diet,     Being Active: Amount recommended (150 minutes moderate or 75 minutes vigorous activity and 2-3 days strength training per week) and Relationship of activity to glucose,     Monitoring: Blood glucose versus Continuous Glucose Monitoring and Individual glucose targets,     Taking Medication: Proper site selection and rotation for injections and When to take medication(s),     Problem Solving: Rule of 15 and carrying a carbohydrate source at  all times in case of low glucose and gvoke instruction,     Reducing Risks: Complications of diabetes, Goal for A1c, how it relates to glucose and how often to check, and Preventing cardiovascular disease, including blood pressure goals, lipid goals, recommendations for cardioprotective medications, statins, and aspirin, and     Healthy Coping: Identifying helpful resources    Patient verbalized understanding of diabetes self-management education concepts discussed, opportunities for ongoing education and support, and recommendations provided today.    Plan  Proceed with review of insulin pump options and contact co to have them review insurance and cost etc.  Orders will be sent to clinic to proceed with ordering process.      Dexcom G7 marla on phone, sent in orders for Dexcom G7     Topics to cover at upcoming visits: Monitoring, Taking Medication, Problem Solving, and Insulin Pump Concepts -- Problem solving with insulin pump therapy - risk of pump failure, MDI back up plan, risk of DKA, keeping adequate supplies on hand, risk of hypoglycemia, exiting and re-entering automated delivery modes     See Care Plan for co-developed, patient-state behavior change goals.    Education Materials Provided:  Pump options  Tandem  Omnipod (only hold 200u of insulin, pt not interested in changing pod more often)  PhoneAndPhone - pt would like alternate option     iLet   Twiist - to be released   Subjective/Objective  Taye is an 49 year old, presenting for the following diabetes education related to: Individual review  Accompanied by: Self  Diabetes education in the past 24mo: (Patient-Rptd) No  Focus of Visit: CGM, Insulin Pump  Type of Pump visit: Other (Discuss pump options)  Type of CGM visit:  (discuss CGMS)  Diabetes type: (Patient-Rptd) Type 1  Disease course: (Patient-Rptd) Stable  How confident are you filling out medical forms by yourself:: (Patient-Rptd) Extremely  Transportation concerns: No  Difficulty affording  "diabetes medication?: No  Difficulty affording diabetes testing supplies?: No  Other concerns: Glasses  Cultural Influences/Ethnic Background:  Not  or     Diabetes Symptoms & Complications:  Diabetes Related Symptoms: (Patient-Rptd) None  Weight trend: (Patient-Rptd) Increasing  Symptom course: (Patient-Rptd) Stable  Disease course: (Patient-Rptd) Stable  Complications assessed today?: Yes  Autonomic neuropathy: No  CVA: No  Heart disease: No  Nephropathy: Yes  Peripheral neuropathy: Yes  Peripheral Vascular Disease: No  Retinopathy: Yes  Sexual dysfunction: Yes    Patient Problem List and Family Medical History reviewed for relevant medical history, current medical status, and diabetes risk factors.    Vitals:  Ht 1.664 m (5' 5.5\")   Wt 86.5 kg (190 lb 12.8 oz)   BMI 31.27 kg/m    Estimated body mass index is 31.27 kg/m  as calculated from the following:    Height as of this encounter: 1.664 m (5' 5.5\").    Weight as of this encounter: 86.5 kg (190 lb 12.8 oz).   Last 3 BP:   BP Readings from Last 3 Encounters:   04/18/25 (!) 164/65   06/11/24 124/72   12/22/23 125/59       History   Smoking Status     Former     Types: Cigarettes   Smokeless Tobacco     Current       Labs:  Lab Results   Component Value Date    A1C 9.3 12/06/2023     Lab Results   Component Value Date    GLC 61 12/22/2023    GLC 38 12/22/2023     12/28/2021     Lab Results   Component Value Date    LDL 59 12/06/2023     03/14/2019     Direct Measure HDL   Date Value Ref Range Status   12/06/2023 43 >=40 mg/dL Final     GFR Estimate   Date Value Ref Range Status   12/06/2023 67 >60 mL/min/1.73m2 Final   11/26/2019 >60 >60 Final     Comment:     _  Unit of Measure:   ml/min/1.73m2       No results found for: \"GFRESTBLACK\"  Lab Results   Component Value Date    CR 1.30 12/06/2023     Lab Results   Component Value Date    MICROL <12.0 12/06/2023    UMALCR  12/06/2023      Comment:      Unable to calculate, urine albumin " and/or urine creatinine is outside detectable limits.  Microalbuminuria is defined as an albumin:creatinine ratio of 17 to 299 for males and 25 to 299 for females. A ratio of albumin:creatinine of 300 or higher is indicative of overt proteinuria.  Due to biologic variability, positive results should be confirmed by a second, first-morning random or 24-hour timed urine specimen. If there is discrepancy, a third specimen is recommended. When 2 out of 3 results are in the microalbuminuria range, this is evidence for incipient nephropathy and warrants increased efforts at glucose control, blood pressure control, and institution of therapy with an angiotensin-converting-enzyme (ACE) inhibitor (if the patient can tolerate it).      .0 12/06/2023 5/7/2025   Healthy Eating   Healthy Eating Assessed Today Yes   Cultural/Roman Catholic diet restrictions? No   Do you have any food allergies or intolerances? Yes   Please list your food allergies / intolerances Gluten Intolerance   Meal planning/habits Carb counting;Heart healthy;Avoiding sweets   Who cooks/prepares meals for you? Self;Spouse;Daughter   Who purchases food in  your home? Self;Spouse   How many times a week on average do you eat food made away from home (restaurant/take-out)? 1   Meals include Breakfast;Lunch;Dinner   Breakfast cereal or toast   Lunch leftovers   Dinner meat, vegetables, small amout of starch GF if any   Snacks fruit   Beverages Water;Coffee;Milk;Diet soda   Has patient met with a dietitian in the past? Yes         5/7/2025   Being Active   Being Active Assessed Today Yes   Barrier to exercise None         5/7/2025   Monitoring   Monitoring Assessed Today Yes   Blood Glucose Meter ContourNext   Times checking blood sugar at home (number) 3   Times checking blood sugar at home (per) Day   Blood glucose trend Increasing         Diabetes Medication(s)       Diabetic Other       Glucagon (GVOKE HYPOPEN) 1 MG/0.2ML pen Inject the contents  of 1 device under the skin into lower abdomen, outer thigh, or outer upper arm as needed for hypoglycemia. If no response after 15 minutes, additional 1 mg dose from a new device may be injected while waiting for emergency assistance.       Insulin       insulin lispro (HUMALOG) 100 UNIT/ML vial USE UP TO 75 UNITS VIA PUMP ONCE DAILY              5/7/2025   Taking Medications   Taking Medication Assessed Today Yes   Current Treatments Insulin Pump   Problems taking diabetes medications regularly? No   Diabetes medication side effects? Yes         5/7/2025   Problem Solving   Problem Solving Assessed Today Yes   Is the patient at risk for hypoglycemia? Yes   Hypoglycemia Frequency Rarely   Hypoglycemia Treatment Juice;Candy   Medical ID Yes   Does patient have glucagon emergency kit? Yes       Yes, ordered today   Is the patient at risk for DKA? Yes   Does patient have ketone test strips? No   Does patient have DKA prevention plan? Yes   Does patient have severe weather/disaster plan for diabetes management? Yes   Does patient have sick day plan for diabetes management? Yes   Hypoglycemia Dizziness/Lightheadedness;Headaches;Hunger;Mood changes;Sleepiness;Speech difficulty;Sweats;Feeling shaky   Hypoglycemia Complications Nocturnal hypoglycemia;Hospitalization       many years ago           5/7/2025   Reducing Risks   Reducing Risks Assessed Today Yes   Diabetes Risks Age over 45 years   CAD Risks Diabetes Mellitus;Male sex;Obesity   Has dilated eye exam at least once a year? Yes   Sees dentist every 6 months? Yes   Feet checked by healthcare provider in the last year? Yes         5/7/2025   Healthy Coping: Diabetes Distress Assessment   Healthy Coping Assessed Today Yes   I feel burned out by all of the attention and effort that diabetes demands of me. 2 - A Little Problem   It bothers me that diabetes seems to control my life. 2 - A Little Problem   I am frustrated that even when I do what I am supposed to for my  diabetes, it doesn't seem to make a difference. 1 - Not a Problem   No matter how hard I try with my diabetes, it feels like it will never be good enough. 2 - A Little Problem   I am so tired of having to worry about diabetes all the time. 2 - A Little Problem   When it comes to my diabetes, I often feel like a failure. 1 - Not a Problem   It depresses me when I realize that my diabetes will likely never go away. 1 - Not a Problem   Living with diabetes is overwhelming for me. 1 - Not a Problem   T2 DDAS Total Score (0 - 1.9 Little or no DD, 2.0 - 2.9 Moderate DD,  3.0+ High DD) 1.5    Informal Support system: Children;Family;Friends;Spouse       Patient-reported       Time Spent: 60 minutes  Encounter Type: Individual    Any diabetes medication dose changes were made via the CDCES Standing Orders under the patient's referring provider.

## 2025-05-08 NOTE — PROGRESS NOTES
Diabetes Self-Management Education & Support    Presents for: Individual review Type 1 Diabetes with polyneuropathy    Type of Service: In Person Visit      Assessment  Pt last seen in 2019 and at that time pt was using medtronic pump and sensor.  Pt discontinued sensor use due to ongoing issues with sensor.  Pt is still using medtronic pump but no sensor.  Pt is here today to discuss pump and sensor options and next steps for ordering process.  Pt will likely proceed with Tandem Diabetes and Dexcom G7    During last consult pt recommended to try to be Gluten free due to GI complaints - issues have resolved and pt continues to be primarily gluten free     Patient's most recent   Lab Results   Component Value Date    A1C 9.3 12/06/2023     is not meeting goal of <7.0    Diabetes knowledge and skills assessment:   Patient is knowledgeable in diabetes management concepts related to: Healthy Eating, Being Active, and Healthy Coping    Based on learning assessment above, most appropriate setting for further diabetes education would be: Individual setting.    Care Plan and Education Provided:  Options for available for integrated pump and sensor options.      Healthy Eating: Balanced meals, Carbohydrate Counting, and Heart healthy diet,     Being Active: Amount recommended (150 minutes moderate or 75 minutes vigorous activity and 2-3 days strength training per week) and Relationship of activity to glucose,     Monitoring: Blood glucose versus Continuous Glucose Monitoring and Individual glucose targets,     Taking Medication: Proper site selection and rotation for injections and When to take medication(s),     Problem Solving: Rule of 15 and carrying a carbohydrate source at all times in case of low glucose and gvoke instruction,     Reducing Risks: Complications of diabetes, Goal for A1c, how it relates to glucose and how often to check, and Preventing cardiovascular disease, including blood pressure goals, lipid goals,  recommendations for cardioprotective medications, statins, and aspirin, and     Healthy Coping: Identifying helpful resources    Patient verbalized understanding of diabetes self-management education concepts discussed, opportunities for ongoing education and support, and recommendations provided today.    Plan  Proceed with review of insulin pump options and contact co to have them review insurance and cost etc.  Orders will be sent to clinic to proceed with ordering process.      Dexcom G7 marla on phone, sent in orders for Dexcom G7     Topics to cover at upcoming visits: Monitoring, Taking Medication, Problem Solving, and Insulin Pump Concepts -- Problem solving with insulin pump therapy - risk of pump failure, MDI back up plan, risk of DKA, keeping adequate supplies on hand, risk of hypoglycemia, exiting and re-entering automated delivery modes     See Care Plan for co-developed, patient-state behavior change goals.    Education Materials Provided:  Pump options  Tandem  Omnipod (only hold 200u of insulin, pt not interested in changing pod more often)  Medtronic - pt would like alternate option     iLet   Twiist - to be released   Subjective/Objective  Taye is an 49 year old, presenting for the following diabetes education related to: Individual review  Accompanied by: Self  Diabetes education in the past 24mo: (Patient-Rptd) No  Focus of Visit: CGM, Insulin Pump  Type of Pump visit: Other (Discuss pump options)  Type of CGM visit:  (discuss CGMS)  Diabetes type: (Patient-Rptd) Type 1  Disease course: (Patient-Rptd) Stable  How confident are you filling out medical forms by yourself:: (Patient-Rptd) Extremely  Transportation concerns: No  Difficulty affording diabetes medication?: No  Difficulty affording diabetes testing supplies?: No  Other concerns: Glasses  Cultural Influences/Ethnic Background:  Not  or     Diabetes Symptoms & Complications:  Diabetes Related Symptoms: (Patient-Rptd)  "None  Weight trend: (Patient-Rptd) Increasing  Symptom course: (Patient-Rptd) Stable  Disease course: (Patient-Rptd) Stable  Complications assessed today?: Yes  Autonomic neuropathy: No  CVA: No  Heart disease: No  Nephropathy: Yes  Peripheral neuropathy: Yes  Peripheral Vascular Disease: No  Retinopathy: Yes  Sexual dysfunction: Yes    Patient Problem List and Family Medical History reviewed for relevant medical history, current medical status, and diabetes risk factors.    Vitals:  Ht 1.664 m (5' 5.5\")   Wt 86.5 kg (190 lb 12.8 oz)   BMI 31.27 kg/m    Estimated body mass index is 31.27 kg/m  as calculated from the following:    Height as of this encounter: 1.664 m (5' 5.5\").    Weight as of this encounter: 86.5 kg (190 lb 12.8 oz).   Last 3 BP:   BP Readings from Last 3 Encounters:   04/18/25 (!) 164/65   06/11/24 124/72   12/22/23 125/59       History   Smoking Status    Former    Types: Cigarettes   Smokeless Tobacco    Current       Labs:  Lab Results   Component Value Date    A1C 9.3 12/06/2023     Lab Results   Component Value Date    GLC 61 12/22/2023    GLC 38 12/22/2023     12/28/2021     Lab Results   Component Value Date    LDL 59 12/06/2023     03/14/2019     Direct Measure HDL   Date Value Ref Range Status   12/06/2023 43 >=40 mg/dL Final     GFR Estimate   Date Value Ref Range Status   12/06/2023 67 >60 mL/min/1.73m2 Final   11/26/2019 >60 >60 Final     Comment:     _  Unit of Measure:   ml/min/1.73m2       No results found for: \"GFRESTBLACK\"  Lab Results   Component Value Date    CR 1.30 12/06/2023     Lab Results   Component Value Date    MICROL <12.0 12/06/2023    UMALCR  12/06/2023      Comment:      Unable to calculate, urine albumin and/or urine creatinine is outside detectable limits.  Microalbuminuria is defined as an albumin:creatinine ratio of 17 to 299 for males and 25 to 299 for females. A ratio of albumin:creatinine of 300 or higher is indicative of overt proteinuria.  Due " to biologic variability, positive results should be confirmed by a second, first-morning random or 24-hour timed urine specimen. If there is discrepancy, a third specimen is recommended. When 2 out of 3 results are in the microalbuminuria range, this is evidence for incipient nephropathy and warrants increased efforts at glucose control, blood pressure control, and institution of therapy with an angiotensin-converting-enzyme (ACE) inhibitor (if the patient can tolerate it).      UCRR 115.0 12/06/2023 5/7/2025   Healthy Eating   Healthy Eating Assessed Today Yes   Cultural/Worship diet restrictions? No   Do you have any food allergies or intolerances? Yes   Please list your food allergies / intolerances Gluten Intolerance   Meal planning/habits Carb counting;Heart healthy;Avoiding sweets   Who cooks/prepares meals for you? Self;Spouse;Daughter   Who purchases food in  your home? Self;Spouse   How many times a week on average do you eat food made away from home (restaurant/take-out)? 1   Meals include Breakfast;Lunch;Dinner   Breakfast cereal or toast   Lunch leftovers   Dinner meat, vegetables, small amout of starch GF if any   Snacks fruit   Beverages Water;Coffee;Milk;Diet soda   Has patient met with a dietitian in the past? Yes         5/7/2025   Being Active   Being Active Assessed Today Yes   Barrier to exercise None         5/7/2025   Monitoring   Monitoring Assessed Today Yes   Blood Glucose Meter ContourNext   Times checking blood sugar at home (number) 3   Times checking blood sugar at home (per) Day   Blood glucose trend Increasing         Diabetes Medication(s)       Diabetic Other       Glucagon (GVOKE HYPOPEN) 1 MG/0.2ML pen Inject the contents of 1 device under the skin into lower abdomen, outer thigh, or outer upper arm as needed for hypoglycemia. If no response after 15 minutes, additional 1 mg dose from a new device may be injected while waiting for emergency assistance.       Insulin        insulin lispro (HUMALOG) 100 UNIT/ML vial USE UP TO 75 UNITS VIA PUMP ONCE DAILY              5/7/2025   Taking Medications   Taking Medication Assessed Today Yes   Current Treatments Insulin Pump   Problems taking diabetes medications regularly? No   Diabetes medication side effects? Yes         5/7/2025   Problem Solving   Problem Solving Assessed Today Yes   Is the patient at risk for hypoglycemia? Yes   Hypoglycemia Frequency Rarely   Hypoglycemia Treatment Juice;Candy   Medical ID Yes   Does patient have glucagon emergency kit? Yes       Yes, ordered today   Is the patient at risk for DKA? Yes   Does patient have ketone test strips? No   Does patient have DKA prevention plan? Yes   Does patient have severe weather/disaster plan for diabetes management? Yes   Does patient have sick day plan for diabetes management? Yes   Hypoglycemia Dizziness/Lightheadedness;Headaches;Hunger;Mood changes;Sleepiness;Speech difficulty;Sweats;Feeling shaky   Hypoglycemia Complications Nocturnal hypoglycemia;Hospitalization       many years ago           5/7/2025   Reducing Risks   Reducing Risks Assessed Today Yes   Diabetes Risks Age over 45 years   CAD Risks Diabetes Mellitus;Male sex;Obesity   Has dilated eye exam at least once a year? Yes   Sees dentist every 6 months? Yes   Feet checked by healthcare provider in the last year? Yes         5/7/2025   Healthy Coping: Diabetes Distress Assessment   Healthy Coping Assessed Today Yes   I feel burned out by all of the attention and effort that diabetes demands of me. 2 - A Little Problem   It bothers me that diabetes seems to control my life. 2 - A Little Problem   I am frustrated that even when I do what I am supposed to for my diabetes, it doesn't seem to make a difference. 1 - Not a Problem   No matter how hard I try with my diabetes, it feels like it will never be good enough. 2 - A Little Problem   I am so tired of having to worry about diabetes all the time. 2 - A Little  Problem   When it comes to my diabetes, I often feel like a failure. 1 - Not a Problem   It depresses me when I realize that my diabetes will likely never go away. 1 - Not a Problem   Living with diabetes is overwhelming for me. 1 - Not a Problem   T2 DDAS Total Score (0 - 1.9 Little or no DD, 2.0 - 2.9 Moderate DD,  3.0+ High DD) 1.5    Informal Support system: Children;Family;Friends;Spouse       Patient-reported       Time Spent: 60 minutes  Encounter Type: Individual    Any diabetes medication dose changes were made via the CDCES Standing Orders under the patient's referring provider.

## 2025-06-24 ENCOUNTER — TELEPHONE (OUTPATIENT)
Dept: FAMILY MEDICINE | Facility: CLINIC | Age: 50
End: 2025-06-24
Payer: COMMERCIAL

## 2025-06-24 NOTE — TELEPHONE ENCOUNTER
Prior Authorization Retail Medication Request    Medication/Dose: insulin lispro (HUMALOG) 100 UNIT/ML vial  Diagnosis and ICD code (if different than what is on RX):    New/renewal/insurance change PA/secondary ins. PA:  Previously Tried and Failed:    Rationale:      Insurance   Primary:   Insurance ID:      Secondary (if applicable):  Insurance ID:      Pharmacy Information (if different than what is on RX)  Name:    Phone:    Fax:    Clinic Information  Preferred routing pool for dept communication:

## 2025-06-26 NOTE — TELEPHONE ENCOUNTER
PER CMM looks like brand humalog is preferred - pharmacy closed for the night try calling Friday AM.

## 2025-06-27 NOTE — TELEPHONE ENCOUNTER
Prior Authorization Not Needed per Insurance    Medication: INSULIN LISPRO  Insurance Company: [employer] - Phone [phone] Fax [phone]  Expected CoPay: $    Pharmacy Filling the Rx: [place], [place] - [address] [place] [address]  Pharmacy Notified: YES  Patient Notified: Instructed pharmacy to notify patient once order is ready.

## 2025-06-29 ENCOUNTER — HEALTH MAINTENANCE LETTER (OUTPATIENT)
Age: 50
End: 2025-06-29

## 2025-09-04 ENCOUNTER — OFFICE VISIT (OUTPATIENT)
Dept: FAMILY MEDICINE | Facility: CLINIC | Age: 50
End: 2025-09-04
Payer: COMMERCIAL

## 2025-09-04 VITALS
SYSTOLIC BLOOD PRESSURE: 144 MMHG | HEIGHT: 66 IN | HEART RATE: 83 BPM | RESPIRATION RATE: 20 BRPM | TEMPERATURE: 98.1 F | WEIGHT: 197 LBS | BODY MASS INDEX: 31.66 KG/M2 | DIASTOLIC BLOOD PRESSURE: 77 MMHG | OXYGEN SATURATION: 100 %

## 2025-09-04 DIAGNOSIS — E10.42 TYPE 1 DIABETES MELLITUS WITH DIABETIC POLYNEUROPATHY (H): Primary | ICD-10-CM

## 2025-09-04 DIAGNOSIS — Z13.6 SCREENING FOR CARDIOVASCULAR CONDITION: ICD-10-CM

## 2025-09-04 DIAGNOSIS — I10 PRIMARY HYPERTENSION: ICD-10-CM

## 2025-09-04 DIAGNOSIS — G57.10 MERALGIA PARESTHETICA, UNSPECIFIED LATERALITY: ICD-10-CM

## 2025-09-04 DIAGNOSIS — Z00.00 HEALTHCARE MAINTENANCE: ICD-10-CM

## 2025-09-04 DIAGNOSIS — K80.50 BILIARY COLIC: ICD-10-CM

## (undated) DEVICE — SU VICRYL+ 0 27 UR6 VLT VCP603H

## (undated) DEVICE — CLIP APPLIER ENDO ROTATING 10MM MED/LG ER320

## (undated) DEVICE — ENDO TROCAR FIRST ENTRY KII FIOS Z-THRD 05X100MM CTF03

## (undated) DEVICE — SUCTION MANIFOLD NEPTUNE 2 SYS 1 PORT 702-025-000

## (undated) DEVICE — SYR 30ML LL W/O NDL 302832

## (undated) DEVICE — TUBING SMOKE EVAC PNEUMOCLEAR HIGH FLOW 0620050250

## (undated) DEVICE — NDL INSUFFLATION 13GA 120MM C2201

## (undated) DEVICE — ENDO TROCAR FIRST ENTRY KII FIOS Z-THRD 11X100MM CTF33

## (undated) DEVICE — SU MONOCRYL+ 4-0 18IN PS2 UND MCP496G

## (undated) DEVICE — SOL WATER IRRIG 1000ML BOTTLE 2F7114

## (undated) DEVICE — Device

## (undated) DEVICE — DECANTER VIAL 2006S

## (undated) DEVICE — PREP CHLORAPREP 26ML TINTED HI-LITE ORANGE 930815

## (undated) DEVICE — ENDO SHEARS RENEW LAP ENDOCUT SCISSOR TIP 16.5MM 3142

## (undated) DEVICE — SOL NACL 0.9% INJ 1000ML BAG 2B1324X

## (undated) DEVICE — GLOVE BIOGEL PI INDICATOR 8.0 LF 41680

## (undated) DEVICE — GLOVE BIOGEL PI ULTRATOUCH G SZ 7.5 42175

## (undated) DEVICE — ENDO POUCH UNIV RETRIEVAL SYSTEM INZII 10MM CD001

## (undated) DEVICE — CUSTOM PACK LAP CHOLE SBA5BLCHEA

## (undated) DEVICE — SU DERMABOND ADVANCED .7ML DNX12

## (undated) DEVICE — ENDO TROCAR SLEEVE KII Z-THREADED 05X100MM CTS02

## (undated) DEVICE — SUCTION STRYKERFLOW II 250-070-500

## (undated) DEVICE — ESU GROUND PAD ADULT REM W/15' CORD E7507DB

## (undated) RX ORDER — FENTANYL CITRATE 50 UG/ML
INJECTION, SOLUTION INTRAMUSCULAR; INTRAVENOUS
Status: DISPENSED
Start: 2023-04-20

## (undated) RX ORDER — FENTANYL CITRATE-0.9 % NACL/PF 10 MCG/ML
PLASTIC BAG, INJECTION (ML) INTRAVENOUS
Status: DISPENSED
Start: 2023-12-22

## (undated) RX ORDER — PROPOFOL 10 MG/ML
INJECTION, EMULSION INTRAVENOUS
Status: DISPENSED
Start: 2023-12-22

## (undated) RX ORDER — FENTANYL CITRATE 50 UG/ML
INJECTION, SOLUTION INTRAMUSCULAR; INTRAVENOUS
Status: DISPENSED
Start: 2023-12-22

## (undated) RX ORDER — LIDOCAINE HYDROCHLORIDE 10 MG/ML
INJECTION, SOLUTION EPIDURAL; INFILTRATION; INTRACAUDAL; PERINEURAL
Status: DISPENSED
Start: 2023-12-22

## (undated) RX ORDER — GLYCOPYRROLATE 0.2 MG/ML
INJECTION, SOLUTION INTRAMUSCULAR; INTRAVENOUS
Status: DISPENSED
Start: 2023-12-22

## (undated) RX ORDER — ONDANSETRON 2 MG/ML
INJECTION INTRAMUSCULAR; INTRAVENOUS
Status: DISPENSED
Start: 2023-12-22

## (undated) RX ORDER — BUPIVACAINE HYDROCHLORIDE 2.5 MG/ML
INJECTION, SOLUTION EPIDURAL; INFILTRATION; INTRACAUDAL
Status: DISPENSED
Start: 2023-12-22

## (undated) RX ORDER — DEXAMETHASONE SODIUM PHOSPHATE 10 MG/ML
INJECTION, SOLUTION INTRAMUSCULAR; INTRAVENOUS
Status: DISPENSED
Start: 2023-12-22